# Patient Record
Sex: MALE | Race: WHITE | NOT HISPANIC OR LATINO | Employment: OTHER | URBAN - METROPOLITAN AREA
[De-identification: names, ages, dates, MRNs, and addresses within clinical notes are randomized per-mention and may not be internally consistent; named-entity substitution may affect disease eponyms.]

---

## 2017-02-22 ENCOUNTER — TRANSCRIBE ORDERS (OUTPATIENT)
Dept: LAB | Facility: CLINIC | Age: 72
End: 2017-02-22

## 2017-02-22 ENCOUNTER — APPOINTMENT (OUTPATIENT)
Dept: LAB | Facility: CLINIC | Age: 72
End: 2017-02-22
Payer: MEDICARE

## 2017-02-22 ENCOUNTER — ALLSCRIPTS OFFICE VISIT (OUTPATIENT)
Dept: OTHER | Facility: OTHER | Age: 72
End: 2017-02-22

## 2017-02-22 ENCOUNTER — GENERIC CONVERSION - ENCOUNTER (OUTPATIENT)
Dept: OTHER | Facility: OTHER | Age: 72
End: 2017-02-22

## 2017-02-22 DIAGNOSIS — I10 ESSENTIAL (PRIMARY) HYPERTENSION: ICD-10-CM

## 2017-02-22 DIAGNOSIS — R07.9 CHEST PAIN, UNSPECIFIED TYPE: Primary | ICD-10-CM

## 2017-02-22 LAB
ANION GAP SERPL CALCULATED.3IONS-SCNC: 8 MMOL/L (ref 4–13)
BUN SERPL-MCNC: 16 MG/DL (ref 5–25)
CALCIUM SERPL-MCNC: 9 MG/DL (ref 8.3–10.1)
CHLORIDE SERPL-SCNC: 102 MMOL/L (ref 100–108)
CO2 SERPL-SCNC: 29 MMOL/L (ref 21–32)
CREAT SERPL-MCNC: 1.06 MG/DL (ref 0.6–1.3)
GFR SERPL CREATININE-BSD FRML MDRD: >60 ML/MIN/1.73SQ M
GLUCOSE SERPL-MCNC: 88 MG/DL (ref 65–140)
POTASSIUM SERPL-SCNC: 3.9 MMOL/L (ref 3.5–5.3)
SODIUM SERPL-SCNC: 139 MMOL/L (ref 136–145)

## 2017-02-22 PROCEDURE — 36415 COLL VENOUS BLD VENIPUNCTURE: CPT

## 2017-02-22 PROCEDURE — 80048 BASIC METABOLIC PNL TOTAL CA: CPT

## 2017-04-03 ENCOUNTER — ALLSCRIPTS OFFICE VISIT (OUTPATIENT)
Dept: OTHER | Facility: OTHER | Age: 72
End: 2017-04-03

## 2017-05-16 ENCOUNTER — ALLSCRIPTS OFFICE VISIT (OUTPATIENT)
Dept: OTHER | Facility: OTHER | Age: 72
End: 2017-05-16

## 2017-05-16 DIAGNOSIS — R26.9 ABNORMALITY OF GAIT AND MOBILITY: ICD-10-CM

## 2017-05-16 DIAGNOSIS — R29.6 REPEATED FALLS: ICD-10-CM

## 2017-05-16 DIAGNOSIS — R13.14 DYSPHAGIA, PHARYNGOESOPHAGEAL PHASE: ICD-10-CM

## 2017-05-17 ENCOUNTER — APPOINTMENT (INPATIENT)
Dept: ULTRASOUND IMAGING | Facility: HOSPITAL | Age: 72
DRG: 093 | End: 2017-05-17
Payer: MEDICARE

## 2017-05-17 ENCOUNTER — HOSPITAL ENCOUNTER (INPATIENT)
Facility: HOSPITAL | Age: 72
LOS: 2 days | Discharge: HOME | DRG: 093 | End: 2017-05-19
Attending: EMERGENCY MEDICINE | Admitting: INTERNAL MEDICINE
Payer: MEDICARE

## 2017-05-17 ENCOUNTER — APPOINTMENT (EMERGENCY)
Dept: CT IMAGING | Facility: HOSPITAL | Age: 72
DRG: 093 | End: 2017-05-17
Payer: MEDICARE

## 2017-05-17 DIAGNOSIS — E11.9 TYPE 2 DIABETES MELLITUS WITHOUT COMPLICATION (HCC): ICD-10-CM

## 2017-05-17 DIAGNOSIS — G20 PARKINSON'S DISEASE (HCC): ICD-10-CM

## 2017-05-17 DIAGNOSIS — I10 ESSENTIAL HYPERTENSION: ICD-10-CM

## 2017-05-17 DIAGNOSIS — R07.9 CHEST PAIN: ICD-10-CM

## 2017-05-17 DIAGNOSIS — F32.A DEPRESSION: ICD-10-CM

## 2017-05-17 DIAGNOSIS — R53.1 ACUTE LEFT-SIDED WEAKNESS: ICD-10-CM

## 2017-05-17 DIAGNOSIS — I71.2 ASCENDING AORTIC ANEURYSM (HCC): ICD-10-CM

## 2017-05-17 DIAGNOSIS — E78.5 HYPERLIPIDEMIA: ICD-10-CM

## 2017-05-17 DIAGNOSIS — R29.810 FACIAL DROOP: Primary | ICD-10-CM

## 2017-05-17 DIAGNOSIS — G31.84 MILD COGNITIVE IMPAIRMENT WITH MEMORY LOSS: ICD-10-CM

## 2017-05-17 DIAGNOSIS — R47.9 DIFFICULTY WITH SPEECH: ICD-10-CM

## 2017-05-17 PROBLEM — R13.10 DYSPHAGIA: Status: ACTIVE | Noted: 2017-05-17

## 2017-05-17 LAB
ALBUMIN SERPL BCP-MCNC: 3.5 G/DL (ref 3.5–5)
ALP SERPL-CCNC: 61 U/L (ref 46–116)
ALT SERPL W P-5'-P-CCNC: 10 U/L (ref 12–78)
ANION GAP SERPL CALCULATED.3IONS-SCNC: 6 MMOL/L (ref 4–13)
AST SERPL W P-5'-P-CCNC: 25 U/L (ref 5–45)
BASOPHILS # BLD AUTO: 0.06 THOUSANDS/ΜL (ref 0–0.1)
BASOPHILS NFR BLD AUTO: 1 % (ref 0–1)
BILIRUB SERPL-MCNC: 0.6 MG/DL (ref 0.2–1)
BILIRUB UR QL STRIP: NEGATIVE
BUN SERPL-MCNC: 12 MG/DL (ref 5–25)
CALCIUM SERPL-MCNC: 9 MG/DL (ref 8.3–10.1)
CHLORIDE SERPL-SCNC: 105 MMOL/L (ref 100–108)
CK MB SERPL-MCNC: 1.7 % (ref 0–2.5)
CK MB SERPL-MCNC: 2.9 NG/ML (ref 0–5)
CK SERPL-CCNC: 147 U/L (ref 39–308)
CK SERPL-CCNC: 168 U/L (ref 39–308)
CLARITY UR: CLEAR
CO2 SERPL-SCNC: 30 MMOL/L (ref 21–32)
COLOR UR: YELLOW
CREAT SERPL-MCNC: 0.93 MG/DL (ref 0.6–1.3)
EOSINOPHIL # BLD AUTO: 0.15 THOUSAND/ΜL (ref 0–0.61)
EOSINOPHIL NFR BLD AUTO: 2 % (ref 0–6)
ERYTHROCYTE [DISTWIDTH] IN BLOOD BY AUTOMATED COUNT: 13.2 % (ref 11.6–15.1)
GFR SERPL CREATININE-BSD FRML MDRD: >60 ML/MIN/1.73SQ M
GLUCOSE SERPL-MCNC: 109 MG/DL (ref 65–140)
GLUCOSE SERPL-MCNC: 166 MG/DL (ref 65–140)
GLUCOSE UR STRIP-MCNC: NEGATIVE MG/DL
HCT VFR BLD AUTO: 42 % (ref 36.5–49.3)
HGB BLD-MCNC: 13.8 G/DL (ref 12–17)
HGB UR QL STRIP.AUTO: NEGATIVE
KETONES UR STRIP-MCNC: NEGATIVE MG/DL
LEUKOCYTE ESTERASE UR QL STRIP: NEGATIVE
LIPASE SERPL-CCNC: 167 U/L (ref 73–393)
LYMPHOCYTES # BLD AUTO: 1.59 THOUSANDS/ΜL (ref 0.6–4.47)
LYMPHOCYTES NFR BLD AUTO: 23 % (ref 14–44)
MCH RBC QN AUTO: 32.7 PG (ref 26.8–34.3)
MCHC RBC AUTO-ENTMCNC: 32.9 G/DL (ref 31.4–37.4)
MCV RBC AUTO: 100 FL (ref 82–98)
MONOCYTES # BLD AUTO: 0.59 THOUSAND/ΜL (ref 0.17–1.22)
MONOCYTES NFR BLD AUTO: 9 % (ref 4–12)
NEUTROPHILS # BLD AUTO: 4.44 THOUSANDS/ΜL (ref 1.85–7.62)
NEUTS SEG NFR BLD AUTO: 65 % (ref 43–75)
NITRITE UR QL STRIP: NEGATIVE
PH UR STRIP.AUTO: 7 [PH] (ref 4.5–8)
PLATELET # BLD AUTO: 187 THOUSANDS/UL (ref 149–390)
PLATELET # BLD AUTO: 196 THOUSANDS/UL (ref 149–390)
PMV BLD AUTO: 11.6 FL (ref 8.9–12.7)
PMV BLD AUTO: 12.3 FL (ref 8.9–12.7)
POTASSIUM SERPL-SCNC: 4.3 MMOL/L (ref 3.5–5.3)
PROT SERPL-MCNC: 7.2 G/DL (ref 6.4–8.2)
PROT UR STRIP-MCNC: NEGATIVE MG/DL
RBC # BLD AUTO: 4.22 MILLION/UL (ref 3.88–5.62)
SODIUM SERPL-SCNC: 141 MMOL/L (ref 136–145)
SP GR UR STRIP.AUTO: <=1.005 (ref 1–1.03)
TROPONIN I SERPL-MCNC: 0.02 NG/ML
TROPONIN I SERPL-MCNC: <0.02 NG/ML
TROPONIN I SERPL-MCNC: <0.02 NG/ML
UROBILINOGEN UR QL STRIP.AUTO: 0.2 E.U./DL
WBC # BLD AUTO: 6.83 THOUSAND/UL (ref 4.31–10.16)

## 2017-05-17 PROCEDURE — 82550 ASSAY OF CK (CPK): CPT | Performed by: INTERNAL MEDICINE

## 2017-05-17 PROCEDURE — 70450 CT HEAD/BRAIN W/O DYE: CPT

## 2017-05-17 PROCEDURE — 94760 N-INVAS EAR/PLS OXIMETRY 1: CPT

## 2017-05-17 PROCEDURE — 83690 ASSAY OF LIPASE: CPT | Performed by: PHYSICIAN ASSISTANT

## 2017-05-17 PROCEDURE — 93005 ELECTROCARDIOGRAM TRACING: CPT

## 2017-05-17 PROCEDURE — 85049 AUTOMATED PLATELET COUNT: CPT | Performed by: INTERNAL MEDICINE

## 2017-05-17 PROCEDURE — 85025 COMPLETE CBC W/AUTO DIFF WBC: CPT | Performed by: PHYSICIAN ASSISTANT

## 2017-05-17 PROCEDURE — 71275 CT ANGIOGRAPHY CHEST: CPT

## 2017-05-17 PROCEDURE — 80053 COMPREHEN METABOLIC PANEL: CPT | Performed by: PHYSICIAN ASSISTANT

## 2017-05-17 PROCEDURE — 84484 ASSAY OF TROPONIN QUANT: CPT | Performed by: PHYSICIAN ASSISTANT

## 2017-05-17 PROCEDURE — 93880 EXTRACRANIAL BILAT STUDY: CPT

## 2017-05-17 PROCEDURE — 74177 CT ABD & PELVIS W/CONTRAST: CPT

## 2017-05-17 PROCEDURE — 36415 COLL VENOUS BLD VENIPUNCTURE: CPT | Performed by: PHYSICIAN ASSISTANT

## 2017-05-17 PROCEDURE — 81003 URINALYSIS AUTO W/O SCOPE: CPT | Performed by: INTERNAL MEDICINE

## 2017-05-17 PROCEDURE — 99285 EMERGENCY DEPT VISIT HI MDM: CPT

## 2017-05-17 PROCEDURE — 84484 ASSAY OF TROPONIN QUANT: CPT | Performed by: INTERNAL MEDICINE

## 2017-05-17 PROCEDURE — 94664 DEMO&/EVAL PT USE INHALER: CPT

## 2017-05-17 PROCEDURE — 82948 REAGENT STRIP/BLOOD GLUCOSE: CPT

## 2017-05-17 PROCEDURE — 82553 CREATINE MB FRACTION: CPT | Performed by: INTERNAL MEDICINE

## 2017-05-17 RX ORDER — ONDANSETRON 2 MG/ML
4 INJECTION INTRAMUSCULAR; INTRAVENOUS EVERY 6 HOURS PRN
Status: DISCONTINUED | OUTPATIENT
Start: 2017-05-17 | End: 2017-05-19 | Stop reason: HOSPADM

## 2017-05-17 RX ORDER — MAGNESIUM HYDROXIDE/ALUMINUM HYDROXICE/SIMETHICONE 120; 1200; 1200 MG/30ML; MG/30ML; MG/30ML
30 SUSPENSION ORAL EVERY 6 HOURS PRN
Status: DISCONTINUED | OUTPATIENT
Start: 2017-05-17 | End: 2017-05-19 | Stop reason: HOSPADM

## 2017-05-17 RX ORDER — HEPARIN SODIUM 5000 [USP'U]/ML
5000 INJECTION, SOLUTION INTRAVENOUS; SUBCUTANEOUS EVERY 8 HOURS SCHEDULED
Status: DISCONTINUED | OUTPATIENT
Start: 2017-05-17 | End: 2017-05-19 | Stop reason: HOSPADM

## 2017-05-17 RX ORDER — NITROGLYCERIN 0.4 MG/1
0.4 TABLET SUBLINGUAL
Status: DISCONTINUED | OUTPATIENT
Start: 2017-05-17 | End: 2017-05-19 | Stop reason: HOSPADM

## 2017-05-17 RX ORDER — LISINOPRIL 20 MG/1
20 TABLET ORAL 2 TIMES DAILY
Status: DISCONTINUED | OUTPATIENT
Start: 2017-05-17 | End: 2017-05-19 | Stop reason: HOSPADM

## 2017-05-17 RX ORDER — ATORVASTATIN CALCIUM 20 MG/1
10 TABLET, FILM COATED ORAL DAILY
COMMUNITY
End: 2017-05-19 | Stop reason: HOSPADM

## 2017-05-17 RX ORDER — ATORVASTATIN CALCIUM 10 MG/1
10 TABLET, FILM COATED ORAL DAILY
Status: DISCONTINUED | OUTPATIENT
Start: 2017-05-18 | End: 2017-05-19

## 2017-05-17 RX ORDER — ASPIRIN 81 MG/1
243 TABLET ORAL ONCE
Status: COMPLETED | OUTPATIENT
Start: 2017-05-17 | End: 2017-05-17

## 2017-05-17 RX ORDER — CLOPIDOGREL BISULFATE 75 MG/1
75 TABLET ORAL DAILY
COMMUNITY
End: 2018-05-29 | Stop reason: SDUPTHER

## 2017-05-17 RX ORDER — FAMOTIDINE 20 MG/1
40 TABLET, FILM COATED ORAL DAILY
Status: DISCONTINUED | OUTPATIENT
Start: 2017-05-18 | End: 2017-05-19 | Stop reason: HOSPADM

## 2017-05-17 RX ORDER — SODIUM CHLORIDE 9 MG/ML
75 INJECTION, SOLUTION INTRAVENOUS CONTINUOUS
Status: DISCONTINUED | OUTPATIENT
Start: 2017-05-17 | End: 2017-05-19 | Stop reason: HOSPADM

## 2017-05-17 RX ORDER — ASPIRIN 81 MG/1
81 TABLET ORAL DAILY
Status: DISCONTINUED | OUTPATIENT
Start: 2017-05-18 | End: 2017-05-19 | Stop reason: HOSPADM

## 2017-05-17 RX ORDER — HYDRALAZINE HYDROCHLORIDE 20 MG/ML
10 INJECTION INTRAMUSCULAR; INTRAVENOUS EVERY 6 HOURS PRN
Status: DISCONTINUED | OUTPATIENT
Start: 2017-05-17 | End: 2017-05-19 | Stop reason: HOSPADM

## 2017-05-17 RX ORDER — ACETAMINOPHEN 325 MG/1
650 TABLET ORAL EVERY 6 HOURS PRN
Status: DISCONTINUED | OUTPATIENT
Start: 2017-05-17 | End: 2017-05-19 | Stop reason: HOSPADM

## 2017-05-17 RX ORDER — ASPIRIN 81 MG/1
81 TABLET ORAL DAILY
COMMUNITY

## 2017-05-17 RX ORDER — ISOSORBIDE DINITRATE 10 MG/1
40 TABLET ORAL DAILY
Status: DISCONTINUED | OUTPATIENT
Start: 2017-05-18 | End: 2017-05-19 | Stop reason: HOSPADM

## 2017-05-17 RX ORDER — ASPIRIN 325 MG
325 TABLET, DELAYED RELEASE (ENTERIC COATED) ORAL ONCE
Status: DISCONTINUED | OUTPATIENT
Start: 2017-05-17 | End: 2017-05-17

## 2017-05-17 RX ORDER — FAMOTIDINE 40 MG/1
40 TABLET, FILM COATED ORAL DAILY
COMMUNITY
End: 2018-05-29

## 2017-05-17 RX ORDER — CITALOPRAM 20 MG/1
20 TABLET ORAL DAILY
Status: DISCONTINUED | OUTPATIENT
Start: 2017-05-18 | End: 2017-05-19 | Stop reason: HOSPADM

## 2017-05-17 RX ORDER — HYDRALAZINE HYDROCHLORIDE 20 MG/ML
5 INJECTION INTRAMUSCULAR; INTRAVENOUS EVERY 6 HOURS PRN
Status: DISCONTINUED | OUTPATIENT
Start: 2017-05-17 | End: 2017-05-19 | Stop reason: HOSPADM

## 2017-05-17 RX ORDER — CHOLECALCIFEROL (VITAMIN D3) 125 MCG
1000 CAPSULE ORAL DAILY
Status: DISCONTINUED | OUTPATIENT
Start: 2017-05-18 | End: 2017-05-19 | Stop reason: HOSPADM

## 2017-05-17 RX ORDER — LISINOPRIL 20 MG/1
20 TABLET ORAL 2 TIMES DAILY
COMMUNITY
End: 2018-02-20 | Stop reason: ALTCHOICE

## 2017-05-17 RX ORDER — ISOSORBIDE DINITRATE 10 MG/1
40 TABLET ORAL DAILY
Status: DISCONTINUED | OUTPATIENT
Start: 2017-05-18 | End: 2017-05-17 | Stop reason: SDUPTHER

## 2017-05-17 RX ORDER — CITALOPRAM 20 MG/1
20 TABLET ORAL DAILY
COMMUNITY
End: 2018-12-18 | Stop reason: SDUPTHER

## 2017-05-17 RX ORDER — LANOLIN ALCOHOL/MO/W.PET/CERES
1000 CREAM (GRAM) TOPICAL DAILY
COMMUNITY

## 2017-05-17 RX ADMIN — SODIUM CHLORIDE 75 ML/HR: 0.9 INJECTION, SOLUTION INTRAVENOUS at 18:06

## 2017-05-17 RX ADMIN — ASPIRIN 243 MG: 81 TABLET, COATED ORAL at 11:30

## 2017-05-17 RX ADMIN — INSULIN LISPRO 1 UNITS: 100 INJECTION, SOLUTION INTRAVENOUS; SUBCUTANEOUS at 22:03

## 2017-05-17 RX ADMIN — METOPROLOL TARTRATE 12.5 MG: 25 TABLET ORAL at 18:52

## 2017-05-17 RX ADMIN — IOHEXOL 85 ML: 350 INJECTION, SOLUTION INTRAVENOUS at 13:54

## 2017-05-17 RX ADMIN — LISINOPRIL 20 MG: 20 TABLET ORAL at 18:52

## 2017-05-18 ENCOUNTER — APPOINTMENT (INPATIENT)
Dept: NUCLEAR MEDICINE | Facility: HOSPITAL | Age: 72
DRG: 093 | End: 2017-05-18
Payer: MEDICARE

## 2017-05-18 ENCOUNTER — APPOINTMENT (INPATIENT)
Dept: MRI IMAGING | Facility: HOSPITAL | Age: 72
DRG: 093 | End: 2017-05-18
Payer: MEDICARE

## 2017-05-18 ENCOUNTER — APPOINTMENT (INPATIENT)
Dept: NON INVASIVE DIAGNOSTICS | Facility: HOSPITAL | Age: 72
DRG: 093 | End: 2017-05-18
Payer: MEDICARE

## 2017-05-18 ENCOUNTER — GENERIC CONVERSION - ENCOUNTER (OUTPATIENT)
Dept: OTHER | Facility: OTHER | Age: 72
End: 2017-05-18

## 2017-05-18 ENCOUNTER — APPOINTMENT (INPATIENT)
Dept: RADIOLOGY | Facility: HOSPITAL | Age: 72
DRG: 093 | End: 2017-05-18
Payer: MEDICARE

## 2017-05-18 LAB
ALBUMIN SERPL BCP-MCNC: 3.3 G/DL (ref 3.5–5)
ALP SERPL-CCNC: 56 U/L (ref 46–116)
ALT SERPL W P-5'-P-CCNC: 29 U/L (ref 12–78)
ANION GAP SERPL CALCULATED.3IONS-SCNC: 6 MMOL/L (ref 4–13)
AST SERPL W P-5'-P-CCNC: 19 U/L (ref 5–45)
ATRIAL RATE: 60 BPM
BILIRUB SERPL-MCNC: 0.6 MG/DL (ref 0.2–1)
BUN SERPL-MCNC: 12 MG/DL (ref 5–25)
CALCIUM SERPL-MCNC: 9 MG/DL (ref 8.3–10.1)
CHEST PAIN STATEMENT: NORMAL
CHLORIDE SERPL-SCNC: 106 MMOL/L (ref 100–108)
CHOLEST SERPL-MCNC: 113 MG/DL (ref 50–200)
CK SERPL-CCNC: 142 U/L (ref 39–308)
CO2 SERPL-SCNC: 29 MMOL/L (ref 21–32)
CREAT SERPL-MCNC: 0.93 MG/DL (ref 0.6–1.3)
ERYTHROCYTE [DISTWIDTH] IN BLOOD BY AUTOMATED COUNT: 13.1 % (ref 11.6–15.1)
GFR SERPL CREATININE-BSD FRML MDRD: >60 ML/MIN/1.73SQ M
GLUCOSE SERPL-MCNC: 110 MG/DL (ref 65–140)
GLUCOSE SERPL-MCNC: 112 MG/DL (ref 65–140)
GLUCOSE SERPL-MCNC: 127 MG/DL (ref 65–140)
GLUCOSE SERPL-MCNC: 93 MG/DL (ref 65–140)
GLUCOSE SERPL-MCNC: 94 MG/DL (ref 65–140)
HCT VFR BLD AUTO: 41.7 % (ref 36.5–49.3)
HDLC SERPL-MCNC: 44 MG/DL (ref 40–60)
HGB BLD-MCNC: 13.5 G/DL (ref 12–17)
LDLC SERPL CALC-MCNC: 43 MG/DL (ref 0–100)
MAX DIASTOLIC BP: 110 MMHG
MAX HEART RATE: 95 BPM
MAX PREDICTED HEART RATE: 149 BPM
MAX. SYSTOLIC BP: 180 MMHG
MCH RBC QN AUTO: 32.4 PG (ref 26.8–34.3)
MCHC RBC AUTO-ENTMCNC: 32.4 G/DL (ref 31.4–37.4)
MCV RBC AUTO: 100 FL (ref 82–98)
P AXIS: 48 DEGREES
PLATELET # BLD AUTO: 178 THOUSANDS/UL (ref 149–390)
PMV BLD AUTO: 11.8 FL (ref 8.9–12.7)
POTASSIUM SERPL-SCNC: 3.7 MMOL/L (ref 3.5–5.3)
PR INTERVAL: 186 MS
PROT SERPL-MCNC: 6.8 G/DL (ref 6.4–8.2)
PROTOCOL NAME: NORMAL
QRS AXIS: -25 DEGREES
QRSD INTERVAL: 82 MS
QT INTERVAL: 410 MS
QTC INTERVAL: 410 MS
RBC # BLD AUTO: 4.17 MILLION/UL (ref 3.88–5.62)
REASON FOR TERMINATION: NORMAL
SODIUM SERPL-SCNC: 141 MMOL/L (ref 136–145)
T WAVE AXIS: 133 DEGREES
TARGET HR FORMULA: NORMAL
TEST INDICATION: NORMAL
TIME IN EXERCISE PHASE: 180 S
TRIGL SERPL-MCNC: 129 MG/DL
VENTRICULAR RATE: 60 BPM
WBC # BLD AUTO: 6.16 THOUSAND/UL (ref 4.31–10.16)

## 2017-05-18 PROCEDURE — 93017 CV STRESS TEST TRACING ONLY: CPT

## 2017-05-18 PROCEDURE — 82550 ASSAY OF CK (CPK): CPT | Performed by: INTERNAL MEDICINE

## 2017-05-18 PROCEDURE — 97110 THERAPEUTIC EXERCISES: CPT

## 2017-05-18 PROCEDURE — 85027 COMPLETE CBC AUTOMATED: CPT | Performed by: INTERNAL MEDICINE

## 2017-05-18 PROCEDURE — 70551 MRI BRAIN STEM W/O DYE: CPT

## 2017-05-18 PROCEDURE — G8978 MOBILITY CURRENT STATUS: HCPCS

## 2017-05-18 PROCEDURE — 97166 OT EVAL MOD COMPLEX 45 MIN: CPT

## 2017-05-18 PROCEDURE — 80061 LIPID PANEL: CPT | Performed by: INTERNAL MEDICINE

## 2017-05-18 PROCEDURE — 78452 HT MUSCLE IMAGE SPECT MULT: CPT

## 2017-05-18 PROCEDURE — 80053 COMPREHEN METABOLIC PANEL: CPT | Performed by: INTERNAL MEDICINE

## 2017-05-18 PROCEDURE — 97163 PT EVAL HIGH COMPLEX 45 MIN: CPT

## 2017-05-18 PROCEDURE — 93306 TTE W/DOPPLER COMPLETE: CPT

## 2017-05-18 PROCEDURE — G8989 SELF CARE D/C STATUS: HCPCS

## 2017-05-18 PROCEDURE — 70544 MR ANGIOGRAPHY HEAD W/O DYE: CPT

## 2017-05-18 PROCEDURE — A9502 TC99M TETROFOSMIN: HCPCS

## 2017-05-18 PROCEDURE — 82948 REAGENT STRIP/BLOOD GLUCOSE: CPT

## 2017-05-18 PROCEDURE — G8988 SELF CARE GOAL STATUS: HCPCS

## 2017-05-18 PROCEDURE — G8979 MOBILITY GOAL STATUS: HCPCS

## 2017-05-18 PROCEDURE — G8987 SELF CARE CURRENT STATUS: HCPCS

## 2017-05-18 PROCEDURE — 70030 X-RAY EYE FOR FOREIGN BODY: CPT

## 2017-05-18 PROCEDURE — 70547 MR ANGIOGRAPHY NECK W/O DYE: CPT

## 2017-05-18 RX ADMIN — LISINOPRIL 20 MG: 20 TABLET ORAL at 17:48

## 2017-05-18 RX ADMIN — FAMOTIDINE 40 MG: 20 TABLET ORAL at 11:46

## 2017-05-18 RX ADMIN — Medication 30 MG: at 11:50

## 2017-05-18 RX ADMIN — ISOSORBIDE DINITRATE 40 MG: 10 TABLET ORAL at 11:45

## 2017-05-18 RX ADMIN — LISINOPRIL 20 MG: 20 TABLET ORAL at 11:46

## 2017-05-18 RX ADMIN — CYANOCOBALAMIN TAB 500 MCG 1000 MCG: 500 TAB at 11:46

## 2017-05-18 RX ADMIN — ATORVASTATIN CALCIUM 10 MG: 10 TABLET, FILM COATED ORAL at 11:49

## 2017-05-18 RX ADMIN — REGADENOSON 0.4 MG: 0.08 INJECTION, SOLUTION INTRAVENOUS at 10:13

## 2017-05-18 RX ADMIN — ASPIRIN 81 MG: 81 TABLET, COATED ORAL at 11:46

## 2017-05-18 RX ADMIN — METOPROLOL TARTRATE 12.5 MG: 25 TABLET ORAL at 17:48

## 2017-05-18 RX ADMIN — SODIUM CHLORIDE 75 ML/HR: 0.9 INJECTION, SOLUTION INTRAVENOUS at 06:29

## 2017-05-18 RX ADMIN — CITALOPRAM HYDROBROMIDE 20 MG: 20 TABLET ORAL at 11:46

## 2017-05-18 RX ADMIN — CARBIDOPA AND LEVODOPA 1 TABLET: 25; 100 TABLET ORAL at 13:21

## 2017-05-18 RX ADMIN — METOPROLOL TARTRATE 12.5 MG: 25 TABLET ORAL at 11:46

## 2017-05-19 VITALS
SYSTOLIC BLOOD PRESSURE: 169 MMHG | DIASTOLIC BLOOD PRESSURE: 86 MMHG | HEART RATE: 57 BPM | RESPIRATION RATE: 18 BRPM | HEIGHT: 67 IN | OXYGEN SATURATION: 93 % | TEMPERATURE: 97.9 F | BODY MASS INDEX: 31.31 KG/M2 | WEIGHT: 199.52 LBS

## 2017-05-19 LAB
ALBUMIN SERPL BCP-MCNC: 3.2 G/DL (ref 3.5–5)
ALP SERPL-CCNC: 53 U/L (ref 46–116)
ALT SERPL W P-5'-P-CCNC: 20 U/L (ref 12–78)
ANION GAP SERPL CALCULATED.3IONS-SCNC: 5 MMOL/L (ref 4–13)
AST SERPL W P-5'-P-CCNC: 13 U/L (ref 5–45)
BILIRUB SERPL-MCNC: 0.6 MG/DL (ref 0.2–1)
BUN SERPL-MCNC: 11 MG/DL (ref 5–25)
CALCIUM SERPL-MCNC: 8.8 MG/DL (ref 8.3–10.1)
CHLORIDE SERPL-SCNC: 107 MMOL/L (ref 100–108)
CO2 SERPL-SCNC: 29 MMOL/L (ref 21–32)
CREAT SERPL-MCNC: 0.93 MG/DL (ref 0.6–1.3)
ERYTHROCYTE [DISTWIDTH] IN BLOOD BY AUTOMATED COUNT: 13.2 % (ref 11.6–15.1)
GFR SERPL CREATININE-BSD FRML MDRD: >60 ML/MIN/1.73SQ M
GLUCOSE SERPL-MCNC: 113 MG/DL (ref 65–140)
GLUCOSE SERPL-MCNC: 118 MG/DL (ref 65–140)
GLUCOSE SERPL-MCNC: 175 MG/DL (ref 65–140)
HCT VFR BLD AUTO: 40.8 % (ref 36.5–49.3)
HGB BLD-MCNC: 13.2 G/DL (ref 12–17)
MCH RBC QN AUTO: 32.4 PG (ref 26.8–34.3)
MCHC RBC AUTO-ENTMCNC: 32.4 G/DL (ref 31.4–37.4)
MCV RBC AUTO: 100 FL (ref 82–98)
PA ADP BLD-ACNC: 216 PRU (ref 194–418)
PLATELET # BLD AUTO: 176 THOUSANDS/UL (ref 149–390)
PMV BLD AUTO: 11.4 FL (ref 8.9–12.7)
POTASSIUM SERPL-SCNC: 3.7 MMOL/L (ref 3.5–5.3)
PROT SERPL-MCNC: 6.5 G/DL (ref 6.4–8.2)
RBC # BLD AUTO: 4.07 MILLION/UL (ref 3.88–5.62)
SODIUM SERPL-SCNC: 141 MMOL/L (ref 136–145)
WBC # BLD AUTO: 5.95 THOUSAND/UL (ref 4.31–10.16)

## 2017-05-19 PROCEDURE — 85027 COMPLETE CBC AUTOMATED: CPT | Performed by: INTERNAL MEDICINE

## 2017-05-19 PROCEDURE — 85576 BLOOD PLATELET AGGREGATION: CPT | Performed by: PSYCHIATRY & NEUROLOGY

## 2017-05-19 PROCEDURE — 80053 COMPREHEN METABOLIC PANEL: CPT | Performed by: INTERNAL MEDICINE

## 2017-05-19 PROCEDURE — 82948 REAGENT STRIP/BLOOD GLUCOSE: CPT

## 2017-05-19 RX ORDER — ATORVASTATIN CALCIUM 10 MG/1
10 TABLET, FILM COATED ORAL
Status: DISCONTINUED | OUTPATIENT
Start: 2017-05-19 | End: 2017-05-19

## 2017-05-19 RX ORDER — ATORVASTATIN CALCIUM 40 MG/1
40 TABLET, FILM COATED ORAL
Qty: 30 TABLET | Refills: 0 | Status: SHIPPED | OUTPATIENT
Start: 2017-05-19 | End: 2018-10-11

## 2017-05-19 RX ORDER — ATORVASTATIN CALCIUM 40 MG/1
40 TABLET, FILM COATED ORAL
Status: DISCONTINUED | OUTPATIENT
Start: 2017-05-19 | End: 2017-05-19 | Stop reason: HOSPADM

## 2017-05-19 RX ADMIN — ISOSORBIDE DINITRATE 40 MG: 10 TABLET ORAL at 08:56

## 2017-05-19 RX ADMIN — CYANOCOBALAMIN TAB 500 MCG 1000 MCG: 500 TAB at 08:57

## 2017-05-19 RX ADMIN — LISINOPRIL 20 MG: 20 TABLET ORAL at 08:56

## 2017-05-19 RX ADMIN — SODIUM CHLORIDE 75 ML/HR: 0.9 INJECTION, SOLUTION INTRAVENOUS at 04:06

## 2017-05-19 RX ADMIN — ASPIRIN 81 MG: 81 TABLET, COATED ORAL at 08:57

## 2017-05-19 RX ADMIN — METOPROLOL TARTRATE 12.5 MG: 25 TABLET ORAL at 08:56

## 2017-05-19 RX ADMIN — FAMOTIDINE 40 MG: 20 TABLET ORAL at 08:56

## 2017-05-19 RX ADMIN — CARBIDOPA AND LEVODOPA 1 TABLET: 25; 100 TABLET ORAL at 08:56

## 2017-05-19 RX ADMIN — Medication 30 MG: at 08:57

## 2017-05-19 RX ADMIN — CITALOPRAM HYDROBROMIDE 20 MG: 20 TABLET ORAL at 08:57

## 2017-05-22 ENCOUNTER — ANESTHESIA EVENT (OUTPATIENT)
Dept: GASTROENTEROLOGY | Facility: HOSPITAL | Age: 72
End: 2017-05-22
Payer: MEDICARE

## 2017-05-22 ENCOUNTER — TRANSCRIBE ORDERS (OUTPATIENT)
Dept: ADMINISTRATIVE | Facility: HOSPITAL | Age: 72
End: 2017-05-22

## 2017-05-22 ENCOUNTER — GENERIC CONVERSION - ENCOUNTER (OUTPATIENT)
Dept: OTHER | Facility: OTHER | Age: 72
End: 2017-05-22

## 2017-05-22 ENCOUNTER — ANESTHESIA (OUTPATIENT)
Dept: GASTROENTEROLOGY | Facility: HOSPITAL | Age: 72
End: 2017-05-22
Payer: MEDICARE

## 2017-05-22 ENCOUNTER — HOSPITAL ENCOUNTER (OUTPATIENT)
Facility: HOSPITAL | Age: 72
Setting detail: OUTPATIENT SURGERY
Discharge: HOME | End: 2017-05-22
Attending: INTERNAL MEDICINE | Admitting: INTERNAL MEDICINE
Payer: MEDICARE

## 2017-05-22 VITALS
HEIGHT: 67 IN | TEMPERATURE: 97.7 F | DIASTOLIC BLOOD PRESSURE: 96 MMHG | BODY MASS INDEX: 30.45 KG/M2 | HEART RATE: 61 BPM | RESPIRATION RATE: 18 BRPM | SYSTOLIC BLOOD PRESSURE: 193 MMHG | WEIGHT: 194 LBS | OXYGEN SATURATION: 95 %

## 2017-05-22 DIAGNOSIS — G62.9 NEUROPATHY: Primary | ICD-10-CM

## 2017-05-22 LAB — GLUCOSE SERPL-MCNC: 114 MG/DL (ref 65–140)

## 2017-05-22 PROCEDURE — 82948 REAGENT STRIP/BLOOD GLUCOSE: CPT

## 2017-05-22 RX ORDER — SODIUM CHLORIDE, SODIUM LACTATE, POTASSIUM CHLORIDE, CALCIUM CHLORIDE 600; 310; 30; 20 MG/100ML; MG/100ML; MG/100ML; MG/100ML
125 INJECTION, SOLUTION INTRAVENOUS CONTINUOUS
Status: DISCONTINUED | OUTPATIENT
Start: 2017-05-22 | End: 2017-05-22 | Stop reason: HOSPADM

## 2017-05-22 RX ORDER — LIDOCAINE HYDROCHLORIDE 10 MG/ML
INJECTION, SOLUTION INFILTRATION; PERINEURAL AS NEEDED
Status: DISCONTINUED | OUTPATIENT
Start: 2017-05-22 | End: 2017-05-22 | Stop reason: SURG

## 2017-05-22 RX ORDER — LIDOCAINE HYDROCHLORIDE 10 MG/ML
0.5 INJECTION, SOLUTION INFILTRATION; PERINEURAL ONCE AS NEEDED
Status: DISCONTINUED | OUTPATIENT
Start: 2017-05-22 | End: 2017-05-22 | Stop reason: HOSPADM

## 2017-05-22 RX ORDER — PANTOPRAZOLE SODIUM 40 MG/1
40 TABLET, DELAYED RELEASE ORAL DAILY
COMMUNITY

## 2017-05-22 RX ORDER — GLYCOPYRROLATE 0.2 MG/ML
INJECTION INTRAMUSCULAR; INTRAVENOUS AS NEEDED
Status: DISCONTINUED | OUTPATIENT
Start: 2017-05-22 | End: 2017-05-22 | Stop reason: SURG

## 2017-05-22 RX ORDER — PROPOFOL 10 MG/ML
INJECTION, EMULSION INTRAVENOUS AS NEEDED
Status: DISCONTINUED | OUTPATIENT
Start: 2017-05-22 | End: 2017-05-22 | Stop reason: SURG

## 2017-05-22 RX ADMIN — PROPOFOL 40 MG: 10 INJECTION, EMULSION INTRAVENOUS at 09:56

## 2017-05-22 RX ADMIN — GLYCOPYRROLATE 0.2 MG: 0.2 INJECTION INTRAMUSCULAR; INTRAVENOUS at 09:44

## 2017-05-22 RX ADMIN — LIDOCAINE HYDROCHLORIDE 50 MG: 10 INJECTION, SOLUTION INFILTRATION; PERINEURAL at 09:44

## 2017-05-22 RX ADMIN — PROPOFOL 100 MG: 10 INJECTION, EMULSION INTRAVENOUS at 09:48

## 2017-05-22 RX ADMIN — PROPOFOL 30 MG: 10 INJECTION, EMULSION INTRAVENOUS at 09:50

## 2017-05-22 RX ADMIN — PROPOFOL 30 MG: 10 INJECTION, EMULSION INTRAVENOUS at 09:53

## 2017-05-22 RX ADMIN — SODIUM CHLORIDE, SODIUM LACTATE, POTASSIUM CHLORIDE, AND CALCIUM CHLORIDE 125 ML/HR: .6; .31; .03; .02 INJECTION, SOLUTION INTRAVENOUS at 08:49

## 2017-05-22 RX ADMIN — SODIUM CHLORIDE, SODIUM LACTATE, POTASSIUM CHLORIDE, AND CALCIUM CHLORIDE: .6; .31; .03; .02 INJECTION, SOLUTION INTRAVENOUS at 09:09

## 2017-05-31 ENCOUNTER — ALLSCRIPTS OFFICE VISIT (OUTPATIENT)
Dept: OTHER | Facility: OTHER | Age: 72
End: 2017-05-31

## 2017-05-31 ENCOUNTER — OFFICE VISIT (OUTPATIENT)
Dept: RADIOLOGY | Facility: CLINIC | Age: 72
End: 2017-05-31
Payer: MEDICARE

## 2017-05-31 DIAGNOSIS — G62.9 NEUROPATHY: ICD-10-CM

## 2017-05-31 PROCEDURE — 95886 MUSC TEST DONE W/N TEST COMP: CPT

## 2017-05-31 PROCEDURE — 95909 NRV CNDJ TST 5-6 STUDIES: CPT

## 2017-06-01 ENCOUNTER — HOSPITAL ENCOUNTER (OUTPATIENT)
Dept: RADIOLOGY | Facility: HOSPITAL | Age: 72
Discharge: HOME | End: 2017-06-01
Attending: INTERNAL MEDICINE
Payer: MEDICARE

## 2017-06-01 DIAGNOSIS — R13.14 DYSPHAGIA, PHARYNGOESOPHAGEAL PHASE: ICD-10-CM

## 2017-06-01 PROCEDURE — 74220 X-RAY XM ESOPHAGUS 1CNTRST: CPT

## 2017-06-01 PROCEDURE — 74230 X-RAY XM SWLNG FUNCJ C+: CPT

## 2017-06-01 PROCEDURE — G8996 SWALLOW CURRENT STATUS: HCPCS

## 2017-06-01 PROCEDURE — G8997 SWALLOW GOAL STATUS: HCPCS

## 2017-06-01 PROCEDURE — 92611 MOTION FLUOROSCOPY/SWALLOW: CPT

## 2017-06-01 PROCEDURE — G8998 SWALLOW D/C STATUS: HCPCS

## 2017-06-02 ENCOUNTER — GENERIC CONVERSION - ENCOUNTER (OUTPATIENT)
Dept: OTHER | Facility: OTHER | Age: 72
End: 2017-06-02

## 2017-06-02 ENCOUNTER — ALLSCRIPTS OFFICE VISIT (OUTPATIENT)
Dept: OTHER | Facility: OTHER | Age: 72
End: 2017-06-02

## 2017-06-07 ENCOUNTER — GENERIC CONVERSION - ENCOUNTER (OUTPATIENT)
Dept: OTHER | Facility: OTHER | Age: 72
End: 2017-06-07

## 2017-06-08 ENCOUNTER — ALLSCRIPTS OFFICE VISIT (OUTPATIENT)
Dept: OTHER | Facility: OTHER | Age: 72
End: 2017-06-08

## 2017-06-15 ENCOUNTER — GENERIC CONVERSION - ENCOUNTER (OUTPATIENT)
Dept: OTHER | Facility: OTHER | Age: 72
End: 2017-06-15

## 2017-07-05 ENCOUNTER — GENERIC CONVERSION - ENCOUNTER (OUTPATIENT)
Dept: OTHER | Facility: OTHER | Age: 72
End: 2017-07-05

## 2017-07-13 ENCOUNTER — ALLSCRIPTS OFFICE VISIT (OUTPATIENT)
Dept: OTHER | Facility: OTHER | Age: 72
End: 2017-07-13

## 2017-07-13 ENCOUNTER — GENERIC CONVERSION - ENCOUNTER (OUTPATIENT)
Dept: OTHER | Facility: OTHER | Age: 72
End: 2017-07-13

## 2017-07-18 ENCOUNTER — GENERIC CONVERSION - ENCOUNTER (OUTPATIENT)
Dept: OTHER | Facility: OTHER | Age: 72
End: 2017-07-18

## 2017-07-26 ENCOUNTER — GENERIC CONVERSION - ENCOUNTER (OUTPATIENT)
Dept: OTHER | Facility: OTHER | Age: 72
End: 2017-07-26

## 2017-07-27 ENCOUNTER — ALLSCRIPTS OFFICE VISIT (OUTPATIENT)
Dept: OTHER | Facility: OTHER | Age: 72
End: 2017-07-27

## 2017-07-31 DIAGNOSIS — M62.838 OTHER MUSCLE SPASM: ICD-10-CM

## 2017-08-01 ENCOUNTER — HOSPITAL ENCOUNTER (OUTPATIENT)
Dept: RADIOLOGY | Facility: HOSPITAL | Age: 72
Discharge: HOME | End: 2017-08-01
Payer: MEDICARE

## 2017-08-01 ENCOUNTER — APPOINTMENT (OUTPATIENT)
Dept: LAB | Facility: CLINIC | Age: 72
End: 2017-08-01
Payer: MEDICARE

## 2017-08-01 DIAGNOSIS — I10 ESSENTIAL (PRIMARY) HYPERTENSION: ICD-10-CM

## 2017-08-01 DIAGNOSIS — E78.5 HYPERLIPIDEMIA: ICD-10-CM

## 2017-08-01 DIAGNOSIS — E11.65 TYPE 2 DIABETES MELLITUS WITH HYPERGLYCEMIA (HCC): ICD-10-CM

## 2017-08-01 DIAGNOSIS — D64.9 ANEMIA: ICD-10-CM

## 2017-08-01 DIAGNOSIS — M62.838 OTHER MUSCLE SPASM: ICD-10-CM

## 2017-08-01 LAB
ALBUMIN SERPL BCP-MCNC: 3.5 G/DL (ref 3.5–5)
ALP SERPL-CCNC: 72 U/L (ref 46–116)
ALT SERPL W P-5'-P-CCNC: 35 U/L (ref 12–78)
ANION GAP SERPL CALCULATED.3IONS-SCNC: 5 MMOL/L (ref 4–13)
AST SERPL W P-5'-P-CCNC: 19 U/L (ref 5–45)
BASOPHILS # BLD AUTO: 0.05 THOUSANDS/ΜL (ref 0–0.1)
BASOPHILS NFR BLD AUTO: 1 % (ref 0–1)
BILIRUB SERPL-MCNC: 0.7 MG/DL (ref 0.2–1)
BILIRUB UR QL STRIP: NEGATIVE
BUN SERPL-MCNC: 21 MG/DL (ref 5–25)
CALCIUM SERPL-MCNC: 9.2 MG/DL (ref 8.3–10.1)
CHLORIDE SERPL-SCNC: 102 MMOL/L (ref 100–108)
CHOLEST SERPL-MCNC: 127 MG/DL (ref 50–200)
CLARITY UR: CLEAR
CO2 SERPL-SCNC: 31 MMOL/L (ref 21–32)
COLOR UR: YELLOW
CREAT SERPL-MCNC: 1.12 MG/DL (ref 0.6–1.3)
EOSINOPHIL # BLD AUTO: 0.34 THOUSAND/ΜL (ref 0–0.61)
EOSINOPHIL NFR BLD AUTO: 6 % (ref 0–6)
ERYTHROCYTE [DISTWIDTH] IN BLOOD BY AUTOMATED COUNT: 13.3 % (ref 11.6–15.1)
EST. AVERAGE GLUCOSE BLD GHB EST-MCNC: 140 MG/DL
GFR SERPL CREATININE-BSD FRML MDRD: 66 ML/MIN/1.73SQ M
GLUCOSE P FAST SERPL-MCNC: 101 MG/DL (ref 65–99)
GLUCOSE UR STRIP-MCNC: NEGATIVE MG/DL
HBA1C MFR BLD: 6.5 % (ref 4.2–6.3)
HCT VFR BLD AUTO: 44.4 % (ref 36.5–49.3)
HDLC SERPL-MCNC: 42 MG/DL (ref 40–60)
HGB BLD-MCNC: 14.4 G/DL (ref 12–17)
HGB UR QL STRIP.AUTO: NEGATIVE
KETONES UR STRIP-MCNC: NEGATIVE MG/DL
LDLC SERPL CALC-MCNC: 51 MG/DL (ref 0–100)
LEUKOCYTE ESTERASE UR QL STRIP: NEGATIVE
LYMPHOCYTES # BLD AUTO: 2.53 THOUSANDS/ΜL (ref 0.6–4.47)
LYMPHOCYTES NFR BLD AUTO: 41 % (ref 14–44)
MCH RBC QN AUTO: 32.4 PG (ref 26.8–34.3)
MCHC RBC AUTO-ENTMCNC: 32.4 G/DL (ref 31.4–37.4)
MCV RBC AUTO: 100 FL (ref 82–98)
MONOCYTES # BLD AUTO: 0.67 THOUSAND/ΜL (ref 0.17–1.22)
MONOCYTES NFR BLD AUTO: 11 % (ref 4–12)
NEUTROPHILS # BLD AUTO: 2.6 THOUSANDS/ΜL (ref 1.85–7.62)
NEUTS SEG NFR BLD AUTO: 41 % (ref 43–75)
NITRITE UR QL STRIP: NEGATIVE
PH UR STRIP.AUTO: 5.5 [PH] (ref 4.5–8)
PLATELET # BLD AUTO: 173 THOUSANDS/UL (ref 149–390)
PMV BLD AUTO: 12.4 FL (ref 8.9–12.7)
POTASSIUM SERPL-SCNC: 4 MMOL/L (ref 3.5–5.3)
PROT SERPL-MCNC: 7.2 G/DL (ref 6.4–8.2)
PROT UR STRIP-MCNC: NEGATIVE MG/DL
RBC # BLD AUTO: 4.44 MILLION/UL (ref 3.88–5.62)
SODIUM SERPL-SCNC: 138 MMOL/L (ref 136–145)
SP GR UR STRIP.AUTO: 1.02 (ref 1–1.03)
TRIGL SERPL-MCNC: 168 MG/DL
TSH SERPL DL<=0.05 MIU/L-ACNC: 3.52 UIU/ML (ref 0.36–3.74)
UROBILINOGEN UR QL STRIP.AUTO: 0.2 E.U./DL
WBC # BLD AUTO: 6.19 THOUSAND/UL (ref 4.31–10.16)

## 2017-08-01 PROCEDURE — 80053 COMPREHEN METABOLIC PANEL: CPT

## 2017-08-01 PROCEDURE — 84443 ASSAY THYROID STIM HORMONE: CPT

## 2017-08-01 PROCEDURE — 72050 X-RAY EXAM NECK SPINE 4/5VWS: CPT

## 2017-08-01 PROCEDURE — 80061 LIPID PANEL: CPT

## 2017-08-01 PROCEDURE — 85025 COMPLETE CBC W/AUTO DIFF WBC: CPT

## 2017-08-01 PROCEDURE — 72072 X-RAY EXAM THORAC SPINE 3VWS: CPT

## 2017-08-01 PROCEDURE — 81003 URINALYSIS AUTO W/O SCOPE: CPT

## 2017-08-01 PROCEDURE — 36415 COLL VENOUS BLD VENIPUNCTURE: CPT

## 2017-08-01 PROCEDURE — 83036 HEMOGLOBIN GLYCOSYLATED A1C: CPT

## 2017-08-02 ENCOUNTER — GENERIC CONVERSION - ENCOUNTER (OUTPATIENT)
Dept: OTHER | Facility: OTHER | Age: 72
End: 2017-08-02

## 2017-08-04 ENCOUNTER — GENERIC CONVERSION - ENCOUNTER (OUTPATIENT)
Dept: OTHER | Facility: OTHER | Age: 72
End: 2017-08-04

## 2017-08-08 ENCOUNTER — GENERIC CONVERSION - ENCOUNTER (OUTPATIENT)
Dept: OTHER | Facility: OTHER | Age: 72
End: 2017-08-08

## 2017-08-10 ENCOUNTER — ALLSCRIPTS OFFICE VISIT (OUTPATIENT)
Dept: OTHER | Facility: OTHER | Age: 72
End: 2017-08-10

## 2017-08-13 DIAGNOSIS — G62.9 POLYNEUROPATHY: ICD-10-CM

## 2017-08-13 DIAGNOSIS — E11.65 TYPE 2 DIABETES MELLITUS WITH HYPERGLYCEMIA (HCC): ICD-10-CM

## 2017-08-13 DIAGNOSIS — R42 DIZZINESS AND GIDDINESS: ICD-10-CM

## 2017-08-13 DIAGNOSIS — D64.9 ANEMIA: ICD-10-CM

## 2017-08-13 DIAGNOSIS — E78.5 HYPERLIPIDEMIA: ICD-10-CM

## 2017-08-13 DIAGNOSIS — R29.6 REPEATED FALLS: ICD-10-CM

## 2017-08-13 DIAGNOSIS — I10 ESSENTIAL (PRIMARY) HYPERTENSION: ICD-10-CM

## 2017-08-13 DIAGNOSIS — G25.0 ESSENTIAL TREMOR: ICD-10-CM

## 2017-08-13 DIAGNOSIS — M54.12 RADICULOPATHY OF CERVICAL REGION: ICD-10-CM

## 2017-08-22 ENCOUNTER — GENERIC CONVERSION - ENCOUNTER (OUTPATIENT)
Dept: OTHER | Facility: OTHER | Age: 72
End: 2017-08-22

## 2017-08-31 ENCOUNTER — GENERIC CONVERSION - ENCOUNTER (OUTPATIENT)
Dept: OTHER | Facility: OTHER | Age: 72
End: 2017-08-31

## 2017-09-19 ENCOUNTER — GENERIC CONVERSION - ENCOUNTER (OUTPATIENT)
Dept: OTHER | Facility: OTHER | Age: 72
End: 2017-09-19

## 2017-09-22 ENCOUNTER — ALLSCRIPTS OFFICE VISIT (OUTPATIENT)
Dept: OTHER | Facility: OTHER | Age: 72
End: 2017-09-22

## 2017-09-22 DIAGNOSIS — M50.920 MID-CERVICAL DISC DISORDER, UNSPECIFIED: ICD-10-CM

## 2017-10-03 ENCOUNTER — APPOINTMENT (OUTPATIENT)
Dept: LAB | Facility: CLINIC | Age: 72
End: 2017-10-03
Payer: MEDICARE

## 2017-10-03 ENCOUNTER — TRANSCRIBE ORDERS (OUTPATIENT)
Dept: LAB | Facility: CLINIC | Age: 72
End: 2017-10-03

## 2017-10-03 DIAGNOSIS — I25.10 ATHEROSCLEROSIS OF NATIVE CORONARY ARTERY OF NATIVE HEART WITHOUT ANGINA PECTORIS: Primary | ICD-10-CM

## 2017-10-03 DIAGNOSIS — I25.10 ATHEROSCLEROSIS OF NATIVE CORONARY ARTERY OF NATIVE HEART WITHOUT ANGINA PECTORIS: ICD-10-CM

## 2017-10-03 LAB
ALBUMIN SERPL BCP-MCNC: 3.3 G/DL (ref 3.5–5)
ALP SERPL-CCNC: 67 U/L (ref 46–116)
ALT SERPL W P-5'-P-CCNC: 34 U/L (ref 12–78)
ANION GAP SERPL CALCULATED.3IONS-SCNC: 8 MMOL/L (ref 4–13)
AST SERPL W P-5'-P-CCNC: 40 U/L (ref 5–45)
BILIRUB SERPL-MCNC: 1.14 MG/DL (ref 0.2–1)
BUN SERPL-MCNC: 17 MG/DL (ref 5–25)
CALCIUM SERPL-MCNC: 8.8 MG/DL (ref 8.3–10.1)
CHLORIDE SERPL-SCNC: 108 MMOL/L (ref 100–108)
CO2 SERPL-SCNC: 25 MMOL/L (ref 21–32)
CREAT SERPL-MCNC: 0.91 MG/DL (ref 0.6–1.3)
GFR SERPL CREATININE-BSD FRML MDRD: 84 ML/MIN/1.73SQ M
GLUCOSE P FAST SERPL-MCNC: 105 MG/DL (ref 65–99)
POTASSIUM SERPL-SCNC: 4 MMOL/L (ref 3.5–5.3)
PROT SERPL-MCNC: 7.2 G/DL (ref 6.4–8.2)
SODIUM SERPL-SCNC: 141 MMOL/L (ref 136–145)
VALPROATE SERPL-MCNC: 14 UG/ML (ref 50–100)

## 2017-10-03 PROCEDURE — 80164 ASSAY DIPROPYLACETIC ACD TOT: CPT

## 2017-10-03 PROCEDURE — 36415 COLL VENOUS BLD VENIPUNCTURE: CPT

## 2017-10-03 PROCEDURE — 80053 COMPREHEN METABOLIC PANEL: CPT

## 2017-10-04 ENCOUNTER — TRANSCRIBE ORDERS (OUTPATIENT)
Dept: ADMINISTRATIVE | Facility: HOSPITAL | Age: 72
End: 2017-10-04

## 2017-10-04 DIAGNOSIS — G47.9 REPETITIVE INTRUSIONS OF SLEEP: Primary | ICD-10-CM

## 2017-10-06 ENCOUNTER — TRANSCRIBE ORDERS (OUTPATIENT)
Dept: SLEEP CENTER | Facility: CLINIC | Age: 72
End: 2017-10-06

## 2017-10-06 ENCOUNTER — ALLSCRIPTS OFFICE VISIT (OUTPATIENT)
Dept: OTHER | Facility: OTHER | Age: 72
End: 2017-10-06

## 2017-10-06 DIAGNOSIS — G47.33 OSA (OBSTRUCTIVE SLEEP APNEA): Primary | ICD-10-CM

## 2017-10-13 ENCOUNTER — GENERIC CONVERSION - ENCOUNTER (OUTPATIENT)
Dept: OTHER | Facility: OTHER | Age: 72
End: 2017-10-13

## 2017-10-20 ENCOUNTER — GENERIC CONVERSION - ENCOUNTER (OUTPATIENT)
Dept: OTHER | Facility: OTHER | Age: 72
End: 2017-10-20

## 2017-10-27 NOTE — CONSULTS
Assessment  Assessed    1  Cervical disc disorder with radiculopathy of mid-cervical region (723 4) (M50 120)   2  Myofascial muscle pain (729 1) (M79 1)    Plan  Cervical disc disorder with radiculopathy of mid-cervical region    · * MRI CERVICAL SPINE WO CONTRAST; Status:Need Information - Financial  Authorization; Requested for:[id number];    Perform:[facility] Radiology; XCD:[id number]; Ordered; For:Cervical disc disorder with radiculopathy of mid-cervical region; Ordered By:Mohan Mcneil;  Myofascial muscle pain    · Methocarbamol 500 MG Oral Tablet; TAKE 0 5 to 1 TABLET Bedtime PRN muscle  spasm   Rx By: Lynn; Dispense: 30 Days ; #:30 Tablet; Refill: 2;For: Myofascial muscle pain; PANCHITO = N; Verified Transmission to [facility] #[id number]; Last Updated By: SystemHubskip; 9/22/2017 11:56:10 AM    Discussion/Summary    The patient's symptoms, history/physical are consistent with pain that is multifactorial in origin but predominantly a result of his underlying cervical spondylosis and disc degeneration which is likely impinging and leading to a left-sided radiculopathy  Symptoms have improved significantly however she is still symptomatic I will order an MRI of the cervical spine to evaluate further  I advised her I'll call him with the results and discuss treatment moving forward   now, I will start him on methocarbamol 500 mg half tablet at bedtime to help with the spasms  He was apprised of the most common side effects including sleepiness and dizziness  Chief Complaint  Chief Complaints    1  Neck Pain    History of Present Illness  The patient is a pleasant 51-year-old male who presents for consultation in regards to neck and left sided arm pain  Symptoms are present for 4-5 months without any precipitating injury or trauma  Pain is moderate to severe rated 5?7/10 on a numeric rating scale that is felt intermittently but worst in the morning   Pain is described to be sharp, pressure-like, shooting in the neck into the left shoulder and down the arm  He feels weakness of the left arm  Symptoms are aggravated with turning his head  There is no change with coughing, sneezing or bowel movements  history has included physical therapy which has been providing significant relief  Exercise on her own provides no relief  He was given gabapentin be had significant side effects from that it  Referring physician is  Bhupinder Garvey presents with complaints of gradual onset of intermittent episodes of moderate entire neck pain, described as sharp, radiating to the left trapezius  On a scale of 1 to 10, the patient rates the pain as 7  Review of Systems    Constitutional: no fever,-- no recent weight gain-- and-- no recent weight loss  Eyes: no double vision-- and-- no blurry vision  Cardiovascular: chest pain-- and-- palpitations, but-- no lower extremity edema  Respiratory: shortness of breath, but-- no wheezing  Musculoskeletal: pain in extremity , but-- no difficulty walking,-- no muscle weakness,-- no joint swelling,-- no limb swelling-- and-- no decreased range of motion  Neurological: dizziness,-- difficulty swallowing-- and-- memory loss, but-- no loss of consciousness-- and-- no seizures  Gastrointestinal: nausea, but-- no vomiting,-- no constipation-- and-- no diarrhea  Genitourinary: no difficulty initiating urine stream,-- no genital pain-- and-- no frequent urination  Integumentary: no complaints of skin rash  Psychiatric: depression  Endocrine: no excessive thirst,-- no adrenal disease,-- no hypothyroidism-- and-- no hyperthyroidism  Hematologic/Lymphatic: no tendency for easy bruising-- and-- no tendency for easy bleeding  ROS reviewed  Active Problems  Problems    1  Abdominal pain, acute, epigastric (789 06,338 19) (R10 13)   2  Acquired polyneuropathy (356 9) (G62 9)   3  Anemia (285 9) (D64 9)   4   Aneurysm of infrarenal abdominal aorta (441 4) (I71 4)   5  Arteriosclerotic cardiovascular disease (429 2,440 9) (I25 10)   6  Benign essential hypertension (401 1) (I10)   7  Bilateral hearing loss, unspecified hearing loss type (389 9) (H91 93)   8  Bilateral impacted cerumen (380 4) (H61 23)   9  Cervical radicular pain (723 4) (M54 12)   10  Diabetes type 2, uncontrolled (250 02) (E11 65)   11  Dizziness (780 4) (R42)   12  Essential tremor (333 1) (G25 0)   13  Frequent falls (V15 88) (R29 6)   14  Gait disturbance (781 2) (R26 9)   15  GERD (gastroesophageal reflux disease) (530 81) (K21 9)   16  Hyperlipidemia (272 4) (E78 5)   17  Murmur (785 2) (R01 1)   18  Onychomycosis (110 1) (B35 1)   19  Peripheral vascular disease (443 9) (I73 9)   20  Pharyngoesophageal dysphagia (787 24) (R13 14)   21  Sensorineural hearing loss, bilateral (389 18) (H90 3)   22  Sleep disturbances (780 50) (G47 9)   23  Thoracic ascending aortic aneurysm (441 2) (I71 2)   24  Transition of care   25  Trapezius muscle spasm (728 85) (W79 188)    Past Medical History  Problems    1  History of Angiographic Coronary Findings   2  History of shortness of breath (V13 89) (Z87 898)   3  History of Medicare annual wellness visit, subsequent (V70 0) (Z00 00)   4  History of Thoracic ascending aortic aneurysm (441 2) (I71 2)    The active problems and past medical history were reviewed and updated today  Surgical History  Problems    1  History of Hernia Repair   2  History of Surgery For Popliteal Artery Aneurysm    The surgical history was reviewed and updated today  Family History    The family history was reviewed and updated today  Social History  Problems    · Former smoker (W30 70) (D91 080)   · Stopped Drinking Alcohol  The social history was reviewed and updated today  The social history was reviewed and is unchanged  Current Meds   1  Accu-Chek Barbara Plus In Vitro Strip; TEST TWICE DAILY; Therapy: 07Apr2017 to (Evaluate:46Hqx8819);  Last Rx:07Apr2017 Ordered   2  [employer] Barbara Plus w/Device Kit; USE AS DIRECTED; Therapy: 16VPY9166 to (Last Rx:07Apr2017) Ordered   3  ALPRAZolam 0 5 MG Oral Tablet; 1/2 TAB IN AM AND 1 TAB AT NIGHT - CAN USE 1/2 TAB   MID DAY FOR PRN ANXIETY; Therapy: 64QGI6194 to (Dale Mauricio)  Requested for: 18Ryz7819; Last   Rx:73Ucp9503 Ordered   4  Aspirin 81 MG TABS; TAKE 1 TABLET DAILY; Therapy: 96LPB3800 to Recorded   5  Atorvastatin Calcium 40 MG Oral Tablet; take 1 tablet by mouth every evening  Requested   for: 51Ddi9006; Last Rx:76Awl2739 Ordered   6  Citalopram Hydrobromide 20 MG Oral Tablet; 1 TAB MID DAY-DOSAGE CHANGE;   Therapy: 63CKL4025 to (Evaluate:15Oct2017)  Requested for: 18Apr2017; Last   Rx:16Dpr5214 Ordered   7  Clopidogrel Bisulfate 75 MG Oral Tablet; TAKE 1 TABLET DAILY; Therapy: (Recorded:06Nov2014) to Recorded   8  CoQ10 CAPS; Therapy: (Recorded:18Jan2016) to Recorded   9  Famotidine 40 MG Oral Tablet; 1 TAB AT SUPPER  FOR ABDOMINAL PAIN- DO NOT TAKE   WITH OTHER MEDS; Therapy: 28CUQ1494 to (Evaluate:17Oct2017)  Requested for: 21Mar2017; Last   Rx:21Mar2017 Ordered   10  Isosorbide Mononitrate ER 60 MG Oral Tablet Extended Release 24 Hour; TAKE 1    TABLET ONCE DAILY; Therapy: 60AST6160 to Recorded   11  Lisinopril 20 MG Oral Tablet; TAKE 1 TABLET TWICE DAILY  Requested for: 12Gpi2945;    Last Rx:43Ruw2624 Ordered   12  MetFORMIN HCl - 1000 MG Oral Tablet; TAKE 1 TABLET TWICE DAILY; Therapy: 07PIP4683 to (Jo Ann Ferro) Recorded   13  Metoprolol Tartrate 25 MG Oral Tablet; TAKES HALF A TABLET TWICE A DAY; Therapy: 50LMC2335 to (Jo Ann Ferro) Recorded   14  Pantoprazole Sodium 40 MG Oral Tablet Delayed Release; take 1 tablet by mouth once    daily; Therapy: 62KIE7438 to ([id number])  Requested for: 28Yfr6763 Recorded   15  PredniSONE 10 MG Oral Tablet; 1 PO BID FOR 3 DAYS THEN 1/2 TAB BID FOR 3 DAYS;     Therapy: 45Ojb2360 to (Evaluate:64Hpu6841)  Requested for: 20YSX1995; Last    Rx:32Odu2376 Ordered   16  Vitamin B12 TABS; Therapy: (Recorded:18Jan2016) to Recorded    The medication list was reviewed and updated today  Allergies  Medication    1  No Known Drug Allergies    Vitals  Vital Signs    Recorded: 30ZWW7434 10:58AM   Temperature 97 8 F   Heart Rate 72   Respiration 16   Systolic 211   Diastolic 80   Height 5 ft 7 in   Weight 195 lb    BMI Calculated 30 54   BSA Calculated 2   O2 Saturation 98   Pain Scale 7     Physical Exam    Constitutional   General appearance: Well developed, well nourished, alert, in no distress, non-toxic and no overt pain behavior  Eyes   Sclera: anicteric   HEENT   Hearing grossly intact  Neck   Neck: Supple, symmetric, trachea midline, no masses  Pulmonary   Respiratory effort: Even and unlabored  Cardiovascular   Examination of extremities: No edema or pitting edema present  Skin   Skin and subcutaneous tissue: Normal without rashes or lesions, well hydrated  Psychiatric   Mood and affect: Mood and affect appropriate  Cervical Spine examination demonstrates Cervical Spine:   Appearance: Normal    Tenderness: cervical spine tenderness,-- left paraspinal tenderness-- and-- left trapezius muscle  Left-sided cervical facet tenderness at C3-4, C4-5, C5-6 with positive facet loading  Palpatory findings include left-sided muscle spasms  Cervical Sensory Exam:  intact to light touch and pinprick in the upper extremities  Flexion was restricted-- and-- was painful  Extension was restricted-- and-- was painful  Left lateral flexion was restricted-- and-- was painful  Right lateral flexion was not restricted-- and-- was painless  Rotation to the left was restricted-- and-- was painful  Rotation to the right was not restricted-- and-- was painless    hand strength was normal bilaterally  wrist strength was normal bilaterally  elbow strength was normal bilaterally     Evaluation of Muscle Stretch Reflexes on the right side demonstrates 2/4 Triceps Reflex-- and-- 2/4 Biceps Reflex  Evaluation of Muscle Stretch Reflexes on the left side demonstrates 2/4 Triceps Reflex-- and-- 2/4 Biceps Reflex  Special Tests: positive Spurling's Maneuver to the left, but-- negative Spurling's Maneuver to the right        Future Appointments    Date/Time Provider Specialty Site   10/18/2017 10:00 AM Candi Velez,  Internal Medicine [address] [facility]   02/19/2018 05:30 PM Hazel Lo MD Neurology  [address] [facility]     Signatures   Electronically signed by : Anthony aFir MD; Sep 22 2017 12:17PM EST                       (Author)

## 2017-11-06 ENCOUNTER — GENERIC CONVERSION - ENCOUNTER (OUTPATIENT)
Dept: OTHER | Facility: OTHER | Age: 72
End: 2017-11-06

## 2017-11-09 ENCOUNTER — GENERIC CONVERSION - ENCOUNTER (OUTPATIENT)
Dept: OTHER | Facility: OTHER | Age: 72
End: 2017-11-09

## 2017-11-15 ENCOUNTER — HOSPITAL ENCOUNTER (OUTPATIENT)
Dept: SLEEP CENTER | Facility: CLINIC | Age: 72
Discharge: HOME | End: 2017-11-15
Payer: MEDICARE

## 2017-11-15 NOTE — PROGRESS NOTES
Consultation - [address] [employer] [address]  67 y o  male  ANGELA:[birth year]  RRP:[id number]    Physician Requesting Consult: Guy Escudero PA-C     Reason for Consult : At your kind request I saw this patient for initial sleep evaluation today  He presents with complaints of sleep terrors and I throw myself out of bed     Medications, Past, Family and Social Histories were reviewed  Comorbidities are notable for:  Hypertension, coronary artery disease for which he recently had PTCA and diabetes  Bartholome Pinks Herewith findings in summary  Full details are available from our records  HPI:  He reports nightmares of several years duration but acting out dreams started approximately a year ago and he has had several episodes  Fortunately there is no report of any injury  He has been snoring for years and this is loud enough to disturb others  He sleeps alone and is not aware of modifying factors or breathing difficulties during sleep  He reported symptoms suggestive of restless leg syndrome and thrashes excessively during sleep  Sleep Routine:  He is spending 8 hours in bed  He usually falls asleep in 30 minutes  Sleep is interrupted 3-4 times a night  He awakens feeling under refreshed and has excessive daytime drowsiness  He rated himself at 9/24 on the [place] Sleepiness Scale and takes daytime naps  Habits:   reports that he quit smoking about 44 years ago  He does not have any smokeless tobacco history on file  ,  reports that he does not drink alcohol ,  reports that he does not use drugs  , he does not exercise  ROS:  Weight has been stable  He has acid reflux  He reports difficulties with memory  He reported no cardiac or respiratory symptoms  A 10 point review of systems was otherwise unremarkable  Physical Exam: Salient findings on exam, are a body mass index 32  Vitals are stable  He had difficulty recalling he has medical problems and medications    Mental state otherwise appears normal  Craniofacial anatomy is normal    There is excess fatty tissue and neck is thick  There are no abnormal neck masses  Nasal airway is patent  Mucous membranes appeared normal  The oral airway is crowded  Base of tongue is at Modified Mallampati class IV  He has dentures in the upper jaw and missing teeth in the lower  Heart sounds are regular, there 3/6 ejection systolic murmur, no JVD or pedal edema  He has truncal obesity  The rest of his exam was unremarkable  Impression:   1  Probable obstructive sleep apnea  2  Restless leg syndrome and he may also have periodic limb movements of sleep  3  REM behavior disorder  4  Hypersomnolence secondary to the above   5  Obesity  6  Comorbidities as outlined above    Plan:  1  With respect to above conditions, I counseled on pathophysiology, diagnosis, treatment options, risks and benefits; interrelationship and effects on symptoms and comorbidities; risks of no treatment; costs and insurance aspects  2  I advised on weight reduction, avoiding sleeping supine, using alcohol or sedating medications close to bed time and on safe driving practices  I also advised on safety precaution with respect to his parasomnia activity  3  Nocturnal polysomnography is indicated and a diagnostic study will be scheduled  This will be undertaken using a parasomnia amount arch  4  Patient is willing to try Positive airway pressure therapy and will be scheduled for a titration study if indicated  5  Follow-up will be scheduled after the studies to review results, further details of treatment options and to initiate/adjust therapy  Thank you for allowing me to participate in the care of this patient  I will keep you apprised of developments       Sincerely,    Cristiane Nieto MD  Board Certified Specialist

## 2017-11-28 ENCOUNTER — HOSPITAL ENCOUNTER (OUTPATIENT)
Dept: SLEEP CENTER | Facility: CLINIC | Age: 72
Discharge: HOME | End: 2017-11-28
Payer: MEDICARE

## 2017-11-28 DIAGNOSIS — G47.33 OSA (OBSTRUCTIVE SLEEP APNEA): ICD-10-CM

## 2017-11-28 DIAGNOSIS — G47.9 REPETITIVE INTRUSIONS OF SLEEP: ICD-10-CM

## 2017-11-28 PROCEDURE — 95810 POLYSOM 6/> YRS 4/> PARAM: CPT

## 2017-11-30 ENCOUNTER — TRANSCRIBE ORDERS (OUTPATIENT)
Dept: SLEEP CENTER | Facility: CLINIC | Age: 72
End: 2017-11-30

## 2017-11-30 DIAGNOSIS — G47.33 OSA (OBSTRUCTIVE SLEEP APNEA): Primary | ICD-10-CM

## 2017-12-04 ENCOUNTER — ALLSCRIPTS OFFICE VISIT (OUTPATIENT)
Dept: OTHER | Facility: OTHER | Age: 72
End: 2017-12-04

## 2017-12-05 NOTE — PROGRESS NOTES
Assessment    1  Diabetes type 2, uncontrolled (250 02) (E11 65)   2  Peripheral vascular disease (443 9) (I73 9)   3  Benign essential hypertension (401 1) (I10)   4  Medicare annual wellness visit, subsequent (V70 0) (Z00 00)   5  Acquired polyneuropathy (356 9) (G62 9)   6  Anxiety (300 00) (F41 9)   7   Arteriosclerotic cardiovascular disease (429 2,440 9) (I25 10)    Plan  Anemia, Arteriosclerotic cardiovascular disease, Benign essential hypertension, Diabetes type 2,uncontrolled, Hyperlipidemia    · Isosorbide Mononitrate ER 60 MG Oral Tablet Extended Release 24 Hour; TAKE 1 TABLETONCE DAILY  Aneurysm of infrarenal abdominal aorta, Arteriosclerotic cardiovascular disease, Benign essentialhypertension, Diabetes type 2, uncontrolled    · Valsartan 320 MG Oral Tablet; TAKE 1 TABLET DAILY  Aneurysm of infrarenal abdominal aorta, Arteriosclerotic cardiovascular disease, Benign essentialhypertension, Diabetes type 2, uncontrolled, Hyperlipidemia, Thoracic ascending aortic aneurysm    · Atorvastatin Calcium 40 MG Oral Tablet; take 1 tablet by mouth every evening  Anxiety, PMH: Mental status change    · Citalopram Hydrobromide 20 MG Oral Tablet; 1 TAB MID DAY-DOSAGE CHANGE  Benign essential hypertension    · Lisinopril 20 MG Oral Tablet  GERD (gastroesophageal reflux disease)    · Pantoprazole Sodium 40 MG Oral Tablet Delayed Release (Protonix); take 1 tablet by mouthonce daily  PMH: Bilateral impacted cerumen    · Clopidogrel Bisulfate 75 MG Oral Tablet; take one tablet by mouth one time daily    Discussion/Summary  Discussion Summary:   HTN- CHANGE LISINOPRIL 20 BID TO VALSARTAN 320 ONCE A DAY TO HELP WITH BETTER BP CONTROL AND COMPLIANCESTABLE- OBTAIN LABS PER [facility] AND GET ME A COPYSTABLE- SOME SOB ON EXERTION- NO CHANGE AND WOKR UP EQUIVOCAL SO FAR; ON BETA BLOCKER AND ISOSORBIDE AS WELL AS ARB; ON SATAIN, PLAVIX AND ASAON PLAVIX- IMPROVED WITH PANTOPRAZOLESTABLEWAS BETTER IWTH HIGHER DOSE OF BZD - MAY NEED TO CHANGE MEDS WHEN WEANED OFFUP 4 MONTHSUP TO [address]  Chief Complaint  Chief Complaint Free Text Note Form: PATIENT HERE FOR FOLLOW UPHAS HAD MULTIPLE MED CHANGES NOTED IN [facility] IN THE AM- GETS BETTER IN 20 MINUTES;[facility]   Chief Complaint Chronic Condition [facility]: Patient is here today for follow up of chronic conditions described in HPI  History of Present Illness  Anxiety Disorder (Follow-Up): The patient is being seen for follow-up of anxiety and HIS ANXIETY IS WORSE; HIS TREMOR OF LEFT UPPER EXT IS WORSE;  The patient reports no change in the condition  He has had no significant interval events  Interval symptoms:  stable anxiety,-- denies difficulty concentrating,-- denies restlessness,-- denies panic attacks-- and-- stable sleep disruption  Associated symptoms: no grandiosity,-- no racing thoughts,-- no periods of euphoria,-- no hallucinations-- and-- no suicidal ideation  Medications:  the patient is adherent to his medication regimen, but-- he denies medication side effects  HPI: PT IS TIRED, ACHEY IN THE AM; HE FELT OFF BALANCE THIS AM- HE HAS HX OF NEUROPATHY; HE IS MORE TIRED; Peripheral Vascular Disease (Brief): The patient is being seen for a routine clinic follow-up of and TREMOR LEFT UPPER EXT; DECREASED SENSATION LOWER EXT peripheral vascular disease  Symptoms:  gait abnormality, but-- no exertional leg pain,-- no resting leg pain-- and-- no leg cramps  The patient is currently experiencing symptoms  No associated symptoms are reported  Hypertension (Follow-Up): The patient presents for follow-up of essential hypertension-- and-- BP NOT WELL CONTROLLED ON LISINOPRIL BID- WILL CHANGE TO VALSARTAN 320 MG  The patient states he has been stable with his blood pressure control since the last visit  Symptoms:    Diabetes Type II (Follow-Up):  The patient states he has been stable with his Type II Diabetes control since the last visit ON METFORMIN 1000 MG DAILY  Comorbid Illnesses: hypertension-- and-- hyperlipidemia  Complications: no peripheral neuropathy,-- no gastroparesis,-- no nephropathy-- and-- no coronary artery disease  He has no known diabetic complications  Symptoms: stable numbness of the feet,-- denies a foot ulcer,-- denies foot pain-- and-- denies vomiting  Associated symptoms include no polyuria  Home monitoring: Glycemic control has been good  -- the patient reports no symptomatic hypoglycemic episodes  Medications: the patient is adherent with his medication regimen  Depression Screening and Treatment Clinical Pathway: The patient is being seen for ANXIETY - STABLE - WEANING OFF OF BZD depression  The patient is currently experiencing symptoms  depressed mood no fatigue no sense of failure no poor concentration no indecisiveness no psychomotor retardation no appetite change no weight loss no weight gain no insomnia                        Review of Systems  Complete-Male:  Constitutional: No fever or chills, feels well, no tiredness, no recent weight gain or weight loss,-- as noted in HPI,-- not feeling poorly-- and-- not feeling tired  Eyes: eyesight problems-- and-- WEARS GLASSES, but-- No complaints of eye pain, no red eyes, no discharge from eyes, no itchy eyes  ENT: HEARING AIDS B/L, but-- no complaints of earache, no hearing loss, no nosebleeds, no nasal discharge, no sore throat, no hoarseness,-- no earache,-- no nosebleeds,-- no hearing loss-- and-- no nasal discharge  Cardiovascular: No complaints of slow heart rate, no fast heart rate, no chest pain, no palpitations, no leg claudication, no lower extremity,-- as noted in HPI,-- no chest pain,-- no intermittent leg claudication,-- no palpitations-- and-- no extremity edema  Respiratory: as noted in HPI,-- no shortness of breath,-- no cough,-- no wheezing-- and-- no shortness of breath during exertion    Gastrointestinal: No complaints of abdominal pain, no constipation, no nausea or vomiting, no diarrhea or bloody stools,-- as noted in HPI,-- no nausea-- and-- no diarrhea--   The patient presents with complaints of sudden onset of intermittent episodes of moderate epigastric no abdominal pain, described as sharp, radiating to the epigastric area  On a scale of 1 to 10, the patient rates the pain as 7  Symptoms are not improved by antiemetics, antacids, bismuth subsalicylate, h2 blockers and PPIs  Symptoms are not made worse by eating, drinking, fatty foods, dairy foods and alcohol  Symptoms are unchanged  Genitourinary: No complaints of dysuria, no incontinence, no hesitancy, no nocturia, no genital lesion, no testicular pain,-- no urinary hesitancy-- and-- no nocturia  Musculoskeletal: No complaints of arthralgia, no myalgias, no joint swelling or stiffness, no limb pain or swelling,-- as noted in HPI,-- no arthralgias,-- no joint swelling-- and-- no joint stiffness  Integumentary: No complaints of skin rash or skin lesions, no itching, no skin wound, no dry skin,-- no rashes,-- no itching,-- no skin lesions-- and-- no skin wound  Neurological: No compliants of headache, no confusion, no convulsions, no numbness or tingling, no dizziness or fainting, no limb weakness, no difficulty walking,-- no headache,-- no numbness,-- no confusion,-- no dizziness-- and-- no convulsions  Psychiatric: Is not suicidal, no sleep disturbances, no anxiety or depression, no change in personality, no emotional problems,-- no anxiety-- and-- no depression  Endocrine: No complaints of proptosis, no hot flashes, no muscle weakness, no erectile dysfunction, no deepening of the voice, no feelings of weakness,-- no proptosis,-- no muscle weakness-- and-- no hot flashes  Hematologic/Lymphatic: No complaints of swollen glands, no swollen glands in the neck, does not bleed easily, no easy bruising,-- no swollen glands-- and-- no swollen glands in the neck  ROS Reviewed:   ROS reviewed        Active Problems  1  Acquired polyneuropathy (356 9) (G62 9)   2  Anemia (285 9) (D64 9)   3  Aneurysm of infrarenal abdominal aorta (441 4) (I71 4)   4  Anxiety (300 00) (F41 9)   5  Arteriosclerotic cardiovascular disease (429 2,440 9) (I25 10)   6  Benign essential hypertension (401 1) (I10)   7  Bilateral hearing loss, unspecified hearing loss type (389 9) (H91 93)   8  Cervical disc disorder with radiculopathy of mid-cervical region (723 4) (M50 120)   9  Cervical radicular pain (723 4) (M54 12)   10  Confusion (298 9) (R41 0)   11  Diabetes type 2, uncontrolled (250 02) (E11 65)   12  Dizziness (780 4) (R42)   13  Essential tremor (333 1) (G25 0)   14  Frequent falls (V15 88) (R29 6)   15  Gait disturbance (781 2) (R26 9)   16  GERD (gastroesophageal reflux disease) (530 81) (K21 9)   17  Hyperlipidemia (272 4) (E78 5)   18  Medicare annual wellness visit, subsequent (V70 0) (Z00 00)   19  Murmur (785 2) (R01 1)   20  Myofascial muscle pain (729 1) (M79 1)   21  Onychomycosis (110 1) (B35 1)   22  Peripheral vascular disease (443 9) (I73 9)   23  Pharyngoesophageal dysphagia (787 24) (R13 14)   24  Sensorineural hearing loss, bilateral (389 18) (H90 3)   25  Sleep disturbances (780 50) (G47 9)   26  Thoracic ascending aortic aneurysm (441 2) (I71 2)   27  Trapezius muscle spasm (728 85) (J74 776)    Past Medical History  1  History of Abdominal pain, acute, epigastric (789 06,338 19) (R10 13)   2  History of Angiographic Coronary Findings   3  History of Bilateral impacted cerumen (380 4) (H61 23)   4  History of influenza vaccination (V49 89) (Z92 29)   5  History of shortness of breath (V13 89) (Z87 898)   6  History of Medicare annual wellness visit, subsequent (V70 0) (Z00 00)   7  History of Thoracic ascending aortic aneurysm (441 2) (I71 2)   8  History of Transition of care   9  History of Transition of care  Active Problems And Past Medical History Reviewed:    The active problems and past medical history were reviewed and updated today  Surgical History  1  History of Hernia Repair   2  History of Surgery For Popliteal Artery Aneurysm  Surgical History Reviewed: The surgical history was reviewed and updated today  Family History  Family History Reviewed: The family history was reviewed and updated today  Social History     · Former smoker (C55 07) (J35 598)   · Stopped Drinking Alcohol  Social History Reviewed: The social history was reviewed and updated today  The social history was reviewed and is unchanged  Current Meds   1  Accu-Chek Barbara Plus In Vitro Strip; TEST TWICE DAILY; Therapy: 07Apr2017 to (Evaluate:77Wbr7772); Last Rx:07Apr2017 Ordered   2  Accu-Chek Barbara Plus w/Device Kit; USE AS DIRECTED; Therapy: 14YKD8532 to (Last Rx:07Apr2017) Ordered   3  ALPRAZolam 0 5 MG Oral Tablet; take 0 5 tablet bedtime; Therapy: (Recorded:27Ltz1363) to Recorded   4  Aspirin 81 MG TABS; TAKE 1 TABLET DAILY; Therapy: 18JUW1703 to Recorded   5  Atorvastatin Calcium 40 MG Oral Tablet; take 1 tablet by mouth every evening  Requested for: 11Jxw5133; Last Rx:56Ynw9911 Ordered   6  Citalopram Hydrobromide 20 MG Oral Tablet; 1 TAB MID DAY-DOSAGE CHANGE; Therapy: 65EEC1669 to (Evaluate:17Nov2018)  Requested for: 72KIE4837; Last Rx:24Nov2017 Ordered   7  Clopidogrel Bisulfate 75 MG Oral Tablet; Therapy: (Recorded:89Mfa5278) to Recorded   8  CoQ10 CAPS; Therapy: (Recorded:18Jan2016) to Recorded   9  Isosorbide Mononitrate ER 60 MG Oral Tablet Extended Release 24 Hour; TAKE 1 TABLET ONCE DAILY; Therapy: 04KHQ6214 to Recorded   10  Lisinopril 20 MG Oral Tablet; TAKE 1 TABLET TWICE DAILY  Requested for: 61XHF8800; Last  Rx:24Nov2017 Ordered   11  MetFORMIN HCl - 1000 MG Oral Tablet; TAKE 0 5 TABLET Daily at dinner time; Therapy: (Recorded:40Gse6070) to Recorded   12  Metoprolol Tartrate 25 MG Oral Tablet; TAKES HALF A TABLET TWICE A DAY; Therapy: 66YPB5760 to (Hermila Celis) Recorded   13   Pantoprazole Sodium 40 MG Oral Tablet Delayed Release; take 1 tablet by mouth once daily; Therapy: 85WTY4259 to (Noralee Schwab)  Requested for: 85Ghc7565 Recorded   14  Vitamin B12 TABS; Therapy: (Recorded:03Vxs5969) to Recorded  Medication List Reviewed: The medication list was reviewed and updated today  Allergies  1  No Known Drug Allergies    Vitals  Vital Signs    Recorded: 98IED3556 10:37AM   Temperature 96 7 F   Heart Rate 68   Respiration 18   Systolic 856   Diastolic 80   Height 5 ft 7 in   Weight 203 lb    BMI Calculated 31 79   BSA Calculated 2 03       Physical Exam   Constitutional  General appearance: No acute distress, well appearing and well nourished  -- WDWN MALE IN NAD  Eyes  Conjunctiva and lids: No swelling, erythema, or discharge  Pupils and irises: Equal, round and reactive to light  Ears, Nose, Mouth, and Throat  External inspection of ears and nose: Normal    Otoscopic examination: Tympanic membrance translucent with normal light reflex  Canals patent without erythema  -- DECREAED HEARING B/L WITH SCREENING TEST DONE IN OFFICE  Nasal mucosa, septum, and turbinates: Normal without edema or erythema  The nasal cavity was examined using a speculum  no nasal discharge,-- normal nasal mucosa,-- normal nasal septum,-- no intranasal masses or polyps-- and-- normal nasal turbinates  Oropharynx: Normal with no erythema, edema, exudate or lesions  Pulmonary  Respiratory effort: No increased work of breathing or signs of respiratory distress  Auscultation of lungs: Abnormal   Auscultation of the lungs revealed no expiratory wheezing,-- normal expiratory time-- and-- no inspiratory wheezing  no rales or crackles were heard bilaterally  no rhonchi  no friction rub  no wheezing  diminished breath sounds over both bases  no bronchial breath sounds  Cardiovascular  Palpation of heart: Normal PMI, no thrills  Auscultation of heart: Normal rate and rhythm, normal S1 and S2, without murmurs   -- NO S3 NOTED  Examination of extremities for edema and/or varicosities: Normal    Carotid pulses: Normal    Abdomen  Abdomen: Non-tender, no masses  Liver and spleen: No hepatomegaly or splenomegaly  -- NO CVA TENDERNESS  Lymphatic  Palpation of lymph nodes in neck: No lymphadenopathy  Musculoskeletal  Gait and station: Normal    Digits and nails: Normal without clubbing or cyanosis  Inspection/palpation of joints, bones, and muscles: Normal    Skin  Skin and subcutaneous tissue: Normal without rashes or lesions  Neurologic  Cranial nerves: Cranial nerves 2-12 intact  Reflexes: 2+ and symmetric  Sensation: No sensory loss  Psychiatric  Orientation to person, place and time: Normal    Mood and affect: Normal    Diabetic Foot Screen: Normal        Health Management  History of Diabetes mellitus   (1) HEMOGLOBIN A1C; every 3 months; Last 01Aug2017; Next Due: 49LXJ7784; Overdue  (1) MICROALBUMIN, 24HR URINE; every 1 year; Next Due: 30AAJ9853; Overdue  *VB - Foot Exam; every 1 year; Last 01Aug2017; Next Due: 55Hsh1583; Active  Health Maintenance   COLONOSCOPY; every 10 years; Last 08LSE0189; Next Due: 00IBM9648; Active    Future Appointments    Date/Time Provider Specialty Site   04/09/2018 10:00 AM aVnessa Velez, DO Internal Medicine [id number] [facility]   02/19/2018 05:30 PM Deana Orellana MD Neurology [address]       Signatures   Electronically signed by :  Jayla Thornton [facility]; Dec  4 2017 12:49PM EST                       (Author)

## 2017-12-06 DIAGNOSIS — E11.65 TYPE 2 DIABETES MELLITUS WITH HYPERGLYCEMIA (HCC): ICD-10-CM

## 2017-12-12 ENCOUNTER — HOSPITAL ENCOUNTER (OUTPATIENT)
Dept: SLEEP CENTER | Facility: CLINIC | Age: 72
Discharge: HOME | End: 2017-12-12
Payer: MEDICARE

## 2017-12-12 DIAGNOSIS — G47.33 OSA (OBSTRUCTIVE SLEEP APNEA): ICD-10-CM

## 2017-12-12 PROCEDURE — 95811 POLYSOM 6/>YRS CPAP 4/> PARM: CPT

## 2017-12-13 DIAGNOSIS — E11.65 TYPE 2 DIABETES MELLITUS WITH HYPERGLYCEMIA (HCC): ICD-10-CM

## 2017-12-18 ENCOUNTER — HOSPITAL ENCOUNTER (OUTPATIENT)
Dept: SLEEP CENTER | Facility: CLINIC | Age: 72
Discharge: HOME | End: 2017-12-18
Payer: MEDICARE

## 2017-12-18 ENCOUNTER — TRANSCRIBE ORDERS (OUTPATIENT)
Dept: SLEEP CENTER | Facility: CLINIC | Age: 72
End: 2017-12-18

## 2017-12-18 DIAGNOSIS — G47.33 OSA (OBSTRUCTIVE SLEEP APNEA): Primary | ICD-10-CM

## 2017-12-18 DIAGNOSIS — G47.33 OSA (OBSTRUCTIVE SLEEP APNEA): ICD-10-CM

## 2017-12-28 ENCOUNTER — ALLSCRIPTS OFFICE VISIT (OUTPATIENT)
Dept: OTHER | Facility: OTHER | Age: 72
End: 2017-12-28

## 2018-01-10 NOTE — RESULT NOTES
Discussion/Summary    Normal esophagogram    ADVISED PT  CS         Verified Results  [facility] BARIUM SWALLOW [id number] 08:07AM Letty Wilson Order Number: [id number]    - Patient Instructions: To schedule this appointment, please contact Central Scheduling at [phone]  Test Name Result Flag Reference   FL ESOPHAGRAM COMPLETE (Report)     BARIUM SWALLOW-ESOPHAGRAM     INDICATION: Difficulty swallowing  Sensation of food stuck in throat  Coughing after meals     COMPARISON: None     IMAGES: 134     FLUOROSCOPY TIME:  1 5 minutes      TECHNIQUE:   The patient was given effervescent granules and barium by mouth and images of the esophagus were obtained  FINDINGS:     The esophagus is normal in caliber  There was minimal esophageal dysmotility with evidence of minor spasms of the lower 3rd of the esophagus during drinking, but there was no significant delay in transit of contrast  The contour of the esophagus seems    somewhat tortuous mostly when the patient is supine  No mucosal lesion, ulceration or evidence of fold thickening is seen  Gastroesophageal reflux was not observed  There is no hiatal hernia  IMPRESSION:     Essentially normal study  Minimal esophageal dysmotility without delay in transit         Workstation performed: [id number]     Signed by:   Shon Buerger, MD   6/1/17

## 2018-01-11 NOTE — PROGRESS NOTES
History of Present Illness  Care Coordination Encounter Information:   Type of Encounter: Telephonic   Contact: Follow-Up    Spoke to Adult Child   Lili  Care Coordination  Nurse [address] [place] [address] [address]:   The reason for call is to discuss outreach for follow up/needed services  Doing good  D/C home 9/29/17 s/p cardiac cath  Currently has home VNA and will be starting cardiac rehab next week  Denies any complaints of chest pain, increased shortness of breath, weakness or fatigue  Denies recent falls  Recently discharged from home PT and feels he is ambulating well  Continuing to check his blood sugar daily  Last blood sugar 87  All prescriptions filled  Denies any needs or concerns at this time  Encouraged to call if needed  Active Problems    1  Abdominal pain, acute, epigastric (789 06,338 19) (R10 13)   2  Acquired polyneuropathy (356 9) (G62 9)   3  Anemia (285 9) (D64 9)   4  Aneurysm of infrarenal abdominal aorta (441 4) (I71 4)   5  Arteriosclerotic cardiovascular disease (429 2,440 9) (I25 10)   6  Benign essential hypertension (401 1) (I10)   7  Bilateral hearing loss, unspecified hearing loss type (389 9) (H91 93)   8  Bilateral impacted cerumen (380 4) (H61 23)   9  Cervical disc disorder with radiculopathy of mid-cervical region (723 4) (M50 120)   10  Cervical radicular pain (723 4) (M54 12)   11  Diabetes type 2, uncontrolled (250 02) (E11 65)   12  Dizziness (780 4) (R42)   13  Essential tremor (333 1) (G25 0)   14  Frequent falls (V15 88) (R29 6)   15  Gait disturbance (781 2) (R26 9)   16  GERD (gastroesophageal reflux disease) (530 81) (K21 9)   17  Hyperlipidemia (272 4) (E78 5)   18  Murmur (785 2) (R01 1)   19  Myofascial muscle pain (729 1) (M79 1)   20  Need for influenza vaccination (V04 81) (Z23)   21  Onychomycosis (110 1) (B35 1)   22  Peripheral vascular disease (443 9) (I73 9)   23  Pharyngoesophageal dysphagia (787 24) (R13 14)   24   Sensorineural hearing loss, bilateral (389 18) (H90 3)   25  Sleep disturbances (780 50) (G47 9)   26  Thoracic ascending aortic aneurysm (441 2) (I71 2)   27  Transition of care   28  Trapezius muscle spasm (728 85) (T29 855)    Past Medical History    1  History of Angiographic Coronary Findings   2  History of shortness of breath (V13 89) (Z87 898)   3  History of Medicare annual wellness visit, subsequent (V70 0) (Z00 00)   4  History of Thoracic ascending aortic aneurysm (441 2) (I71 2)   5  History of Transition of care    Surgical History    1  History of Hernia Repair   2  History of Surgery For Popliteal Artery Aneurysm    Social History    · Former smoker (D10 01) (W54 035)   · Stopped Drinking Alcohol    Current Meds    1  Famotidine 40 MG Oral Tablet; 1 TAB AT SUPPER FOR ABDOMINAL PAIN- DO NOT TAKE   WITH OTHER MEDS; Therapy: 19TQM3682 to (Evaluate:08Apr2018)  Requested for: 68JDG9610; Last   Rx:10Oct2017 Ordered    2  Aspirin 81 MG TABS; TAKE 1 TABLET DAILY; Therapy: 50EKK8415 to Recorded   3  Isosorbide Mononitrate ER 60 MG Oral Tablet Extended Release 24 Hour; TAKE 1   TABLET ONCE DAILY; Therapy: 73XOE3478 to Recorded   4  MetFORMIN HCl - 1000 MG Oral Tablet; TAKE 1 TABLET TWICE DAILY; Therapy: 47YTJ3021 to (Harish Moreno) Recorded   5  Metoprolol Tartrate 25 MG Oral Tablet; TAKES HALF A TABLET TWICE A DAY; Therapy: 33KIL7254 to (Harish Moreno) Recorded    6  Lisinopril 20 MG Oral Tablet; TAKE 1 TABLET TWICE DAILY  Requested for: 88Pqw4330;   Last Rx:25Vjg7509 Ordered    7  Atorvastatin Calcium 40 MG Oral Tablet; take 1 tablet by mouth every evening  Requested   for: 25Ans2740; Last Rx:04Cim8538 Ordered    8  Accu-Chek Barbara Plus In Vitro Strip; TEST TWICE DAILY; Therapy: 07Apr2017 to (Evaluate:39Toa8434); Last Rx:07Apr2017 Ordered   9  Accu-Chek Barbara Plus w/Device Kit; USE AS DIRECTED; Therapy: 30QDJ3532 to (Last Rx:07Apr2017) Ordered    10   Pantoprazole Sodium 40 MG Oral Tablet Delayed Release (Protonix); take 1 tablet by    mouth once daily; Therapy: 73MTD7552 to (468 4529)  Requested for: 23Odh5032 Recorded    11  CoQ10 CAPS; Therapy: (Recorded:18Jan2016) to Recorded   12  Vitamin B12 TABS; Therapy: (Recorded:18Jan2016) to Recorded    13  Methocarbamol 500 MG Oral Tablet; TAKE 0 5 to 1 TABLET Bedtime PRN muscle spasm; Therapy: 54TIU0690 to (Evaluate:12Lxh8424)  Requested for: 92CER6296; Last    Rx:46Fxh9085 Ordered    14  ALPRAZolam 0 5 MG Oral Tablet; 1/2 TAB IN AM AND 1 TAB AT NIGHT - CAN USE 1/2 TAB    MID DAY FOR PRN ANXIETY; Therapy: 21MHB3447 to (Evaluate:07Fau4362)  Requested for: 59WAR7473; Last    Rx:06Oct2017 Ordered    15  Citalopram Hydrobromide 20 MG Oral Tablet; 1 TAB MID DAY-DOSAGE CHANGE;    Therapy: 32KGU7790 to (Evaluate:15Oct2017)  Requested for: 18Apr2017; Last    Rx:18Apr2017 Ordered    16  Brilinta TABS; Therapy: (Recorded:06Oct2017) to Recorded   17  Depakote 250 MG Oral Tablet Delayed Release (Divalproex Sodium); Therapy: (Recorded:06Oct2017) to Recorded    Allergies    1  No Known Drug Allergies    Health Management   (1) HEMOGLOBIN A1C; every 3 months; Last 01Aug2017; Next Due: 75YKT3707; Active  (1) MICROALBUMIN, 24HR URINE; every 1 year; Next Due: 23RIX7450; Overdue  *VB - Foot Exam; every 1 year; Last 01Aug2017; Next Due: 51Nfc3514; Active   COLONOSCOPY; every 10 years; Last 31ESM3077; Next Due: 06HXY7287; Active    End of Encounter Meds    1  Famotidine 40 MG Oral Tablet; 1 TAB AT SUPPER FOR ABDOMINAL PAIN- DO NOT TAKE   WITH OTHER MEDS; Therapy: 51ZWS0698 to (Evaluate:08Apr2018)  Requested for: 41CUU3549; Last   Rx:10Oct2017 Ordered    2  Aspirin 81 MG TABS; TAKE 1 TABLET DAILY; Therapy: 46SSP9378 to Recorded   3  Isosorbide Mononitrate ER 60 MG Oral Tablet Extended Release 24 Hour; TAKE 1   TABLET ONCE DAILY; Therapy: 03AEW3773 to Recorded   4  MetFORMIN HCl - 1000 MG Oral Tablet; TAKE 1 TABLET TWICE DAILY;    Therapy: 67VGX2875 to (Ree Reasons) Recorded   5  Metoprolol Tartrate 25 MG Oral Tablet; TAKES HALF A TABLET TWICE A DAY; Therapy: 15KGH0563 to (Thor Ashraf) Recorded    6  Lisinopril 20 MG Oral Tablet; TAKE 1 TABLET TWICE DAILY  Requested for: 91Kpt5292;   Last Rx:50Usa6269 Ordered    7  Atorvastatin Calcium 40 MG Oral Tablet; take 1 tablet by mouth every evening  Requested   for: 12Acx6686; Last Rx:90Jyb5272 Ordered    8  Accu-Chek Barbara Plus In Vitro Strip; TEST TWICE DAILY; Therapy: 07Apr2017 to (Evaluate:65Mbo9601); Last Rx:07Apr2017 Ordered   9  Accu-Chek Barbara Plus w/Device Kit; USE AS DIRECTED; Therapy: 30WMY9252 to (Last Rx:07Apr2017) Ordered    10  Pantoprazole Sodium 40 MG Oral Tablet Delayed Release (Protonix); take 1 tablet by    mouth once daily; Therapy: 96AWL2179 to (26 [id number])  Requested for: 42Sqk8417 Recorded    11  CoQ10 CAPS; Therapy: (Recorded:18Jan2016) to Recorded   12  Vitamin B12 TABS; Therapy: (Recorded:18Jan2016) to Recorded    13  Methocarbamol 500 MG Oral Tablet; TAKE 0 5 to 1 TABLET Bedtime PRN muscle spasm; Therapy: 10UCK3523 to (Evaluate:90Lkt3532)  Requested for: 17ILZ4904; Last    Rx:39Muq9398 Ordered    14  ALPRAZolam 0 5 MG Oral Tablet; 1/2 TAB IN AM AND 1 TAB AT NIGHT - CAN USE 1/2 TAB    MID DAY FOR PRN ANXIETY; Therapy: 43RJP6316 to (Evaluate:39Cnu9178)  Requested for: 00BLP2390; Last    Rx:06Oct2017 Ordered    15  Citalopram Hydrobromide 20 MG Oral Tablet; 1 TAB MID DAY-DOSAGE CHANGE;    Therapy: 07ACK7071 to (Evaluate:15Oct2017)  Requested for: 18Apr2017; Last    Rx:18Apr2017 Ordered    16  Brilinta TABS; Therapy: (Recorded:06Oct2017) to Recorded   17  Depakote 250 MG Oral Tablet Delayed Release (Divalproex Sodium);     Therapy: (Recorded:06Oct2017) to Recorded    Future Appointments    Date/Time Provider Specialty Site   10/18/2017 10:00 AM Joey Velez DO Internal Medicine [address]   02/19/2018 05:30 PM Damir Ding MD Neurology [facility] [address]     Patient Care Team    Care Team Member Role Specialty Office Number   Gill Farrell MD Specialist Gastroenterology Adult [phone]   Jett Harman MD   [phone]     Signatures   Electronically signed by : Freda Tapia; Oct 13 2017 11:02AM EST                       (Author)

## 2018-01-11 NOTE — PROGRESS NOTES
History of Present Illness  Care Coordination Encounter Information:   Type of Encounter: Telephonic   Contact: Initial Contact    Spoke to Patient  Care Coordination  Nurse [address] [place] [address] [address]:   The reason for call is to discuss outreach for follow up/needed services  Doing good  DaughterMahesh, continuing to check blood sugars daily at home  Last blood sugar was 116  Has diabetic supplies  States fell one week ago  [place] that he turned too quickly and lost his balance  Denies any injuries  Does not use ambulatory aide  Discussed patient safety  Complains of pain to neck  Outpatient physical therapy placed on hold by Dr Henry Son due to neck pain  Inquiring if patient can restart PT  Spoke with Dr Henry Son and ok to resume  [address], will call and schedule at [facility]  All prescriptions filled  Denies any further concerns at this time  Encouraged to call if needed  Active Problems    1  Abdominal pain, acute, epigastric (789 06,338 19) (R10 13)   2  Acquired polyneuropathy (356 9) (G62 9)   3  Anemia (285 9) (D64 9)   4  Aneurysm of infrarenal abdominal aorta (441 4) (I71 4)   5  Arteriosclerotic cardiovascular disease (429 2,440 9) (I25 10)   6  Benign essential hypertension (401 1) (I10)   7  Bilateral hearing loss, unspecified hearing loss type (389 9) (H91 93)   8  Bilateral impacted cerumen (380 4) (H61 23)   9  Cervical radicular pain (723 4) (M54 12)   10  Diabetes type 2, uncontrolled (250 02) (E11 65)   11  Dizziness (780 4) (R42)   12  Essential tremor (333 1) (G25 0)   13  Frequent falls (V15 88) (R29 6)   14  Gait disturbance (781 2) (R26 9)   15  GERD (gastroesophageal reflux disease) (530 81) (K21 9)   16  Hyperlipidemia (272 4) (E78 5)   17  Murmur (785 2) (R01 1)   18  Onychomycosis (110 1) (B35 1)   19  Peripheral vascular disease (443 9) (I73 9)   20  Pharyngoesophageal dysphagia (787 24) (R13 14)   21  Sensorineural hearing loss, bilateral (389 18) (H90 3)   22   Sleep disturbances (780 50) (G47 9)   23  Thoracic ascending aortic aneurysm (441 2) (I71 2)   24  Transition of care   25  Trapezius muscle spasm (728 85) (U31 000)    Past Medical History    1  History of Angiographic Coronary Findings   2  History of shortness of breath (V13 89) (Z87 898)   3  History of Medicare annual wellness visit, subsequent (V70 0) (Z00 00)   4  History of Thoracic ascending aortic aneurysm (441 2) (I71 2)    Surgical History    1  History of Hernia Repair   2  History of Surgery For Popliteal Artery Aneurysm    Social History    · Former smoker (C13 93) (U30 606)   · Stopped Drinking Alcohol    Current Meds    1  Famotidine 40 MG Oral Tablet; 1 TAB AT SUPPER  FOR ABDOMINAL PAIN- DO NOT TAKE   WITH OTHER MEDS; Therapy: 04WZG1339 to (Evaluate:17Oct2017)  Requested for: 21Mar2017; Last   Rx:21Mar2017 Ordered    2  Aspirin 81 MG TABS; TAKE 1 TABLET DAILY; Therapy: 12FXD6630 to Recorded   3  Isosorbide Mononitrate ER 60 MG Oral Tablet Extended Release 24 Hour; TAKE 1   TABLET ONCE DAILY; Therapy: 15ZCN9134 to Recorded   4  MetFORMIN HCl - 1000 MG Oral Tablet; TAKE 1 TABLET TWICE DAILY; Therapy: 51BBI4195 to (Lanny Severe) Recorded   5  Metoprolol Tartrate 25 MG Oral Tablet; TAKES HALF A TABLET TWICE A DAY; Therapy: 30NBN7206 to (Lanny Severe) Recorded    6  Lisinopril 20 MG Oral Tablet; TAKE 1 TABLET TWICE DAILY  Requested for: 89Drm5163;   Last Rx:45Tnb2544 Ordered    7  Atorvastatin Calcium 40 MG Oral Tablet; take 1 tablet by mouth every evening  Requested   for: 23Tbu7261; Last Rx:02Kkd0432 Ordered    8  PredniSONE 10 MG Oral Tablet; 1 PO BID FOR 3 DAYS THEN 1/2 TAB BID FOR 3 DAYS; Therapy: 88Xps1417 to (Evaluate:94Xtv3928)  Requested for: 16Pir2906; Last   Rx:27Jdx2806 Ordered    9  Accu-Chek Barbara Plus In Vitro Strip; TEST TWICE DAILY; Therapy: 07Apr2017 to (Evaluate:21Rje0043); Last Rx:07Apr2017 Ordered   10   Accu-Chek Barbara Plus w/Device Kit; USE AS DIRECTED; Therapy: 25MVT1949 to (Last Rx:07Apr2017) Ordered    11  Pantoprazole Sodium 40 MG Oral Tablet Delayed Release (Protonix); take 1 tablet by    mouth once daily; Therapy: 88XCN3874 to ([place])  Requested for: 27Jul2017 Recorded    12  CoQ10 CAPS; Therapy: (Recorded:18Jan2016) to Recorded   13  Vitamin B12 TABS; Therapy: (Recorded:18Jan2016) to Recorded    14  ALPRAZolam 0 5 MG Oral Tablet; 1/2 TAB IN AM AND 1 TAB AT NIGHT - CAN USE 1/2 TAB    MID DAY FOR PRN ANXIETY; Therapy: 91AMI3089 to (Evaluate:07Zco1454)  Requested for: 50ZJU0486; Last    Rx:70Fdk1342 Ordered    15  Citalopram Hydrobromide 20 MG Oral Tablet; 1 TAB MID DAY-DOSAGE CHANGE;    Therapy: 82FQY7395 to (Evaluate:15Oct2017)  Requested for: 18Apr2017; Last    Rx:18Apr2017 Ordered    16  Clopidogrel Bisulfate 75 MG Oral Tablet; TAKE 1 TABLET DAILY; Therapy: (Recorded:06Nov2014) to Recorded    Allergies    1  No Known Drug Allergies    Health Management   (1) HEMOGLOBIN A1C; every 3 months; Last 01Aug2017; Next Due: 15FEV0863; Active  (1) MICROALBUMIN, 24HR URINE; every 1 year; Next Due: 72LMJ6195; Overdue  *VB - Foot Exam; every 1 year; Last 10Gea6670; Next Due: 21Gow6217; Active   COLONOSCOPY; every 10 years; Last 68MQC0995; Next Due: 78ZSG1841; Active    End of Encounter Meds    1  Famotidine 40 MG Oral Tablet; 1 TAB AT SUPPER  FOR ABDOMINAL PAIN- DO NOT TAKE   WITH OTHER MEDS; Therapy: 02VJZ4375 to (Evaluate:17Oct2017)  Requested for: 21Mar2017; Last   Rx:21Mar2017 Ordered    2  Aspirin 81 MG TABS; TAKE 1 TABLET DAILY; Therapy: 24FBN3134 to Recorded   3  Isosorbide Mononitrate ER 60 MG Oral Tablet Extended Release 24 Hour; TAKE 1   TABLET ONCE DAILY; Therapy: 51ZYO8944 to Recorded   4  MetFORMIN HCl - 1000 MG Oral Tablet; TAKE 1 TABLET TWICE DAILY; Therapy: 07GTW2849 to (Erlene Keep) Recorded   5  Metoprolol Tartrate 25 MG Oral Tablet; TAKES HALF A TABLET TWICE A DAY;    Therapy: 42MFD4274 to (Massiel Rocha) Recorded    6  Lisinopril 20 MG Oral Tablet; TAKE 1 TABLET TWICE DAILY  Requested for: 31Dpq6746;   Last Rx:25Kdj9448 Ordered    7  Atorvastatin Calcium 40 MG Oral Tablet; take 1 tablet by mouth every evening  Requested   for: 38Wmg2777; Last Rx:34Xiz4626 Ordered    8  PredniSONE 10 MG Oral Tablet; 1 PO BID FOR 3 DAYS THEN 1/2 TAB BID FOR 3 DAYS; Therapy: 25Wri9754 to (Evaluate:05Aga9536)  Requested for: 66Xwk6691; Last   Rx:72Ubh3473 Ordered    9  Accu-Chek Barbara Plus In Vitro Strip; TEST TWICE DAILY; Therapy: 07Apr2017 to (Evaluate:80Rpy5516); Last Rx:07Apr2017 Ordered   10  Accu-Chek Barbara Plus w/Device Kit; USE AS DIRECTED; Therapy: 26AAO5290 to (Last Rx:07Apr2017) Ordered    11  Pantoprazole Sodium 40 MG Oral Tablet Delayed Release (Protonix); take 1 tablet by    mouth once daily; Therapy: 43XAP3516 to (Arby Merlin)  Requested for: 27Jul2017 Recorded    12  CoQ10 CAPS; Therapy: (Recorded:18Jan2016) to Recorded   13  Vitamin B12 TABS; Therapy: (Recorded:18Jan2016) to Recorded    14  ALPRAZolam 0 5 MG Oral Tablet; 1/2 TAB IN AM AND 1 TAB AT NIGHT - CAN USE 1/2 TAB    MID DAY FOR PRN ANXIETY; Therapy: 51TUL6857 to (Evaluate:58Med8951)  Requested for: 64DQR5365; Last    Rx:00Mxz3785 Ordered    15  Citalopram Hydrobromide 20 MG Oral Tablet; 1 TAB MID DAY-DOSAGE CHANGE;    Therapy: 63TTX6174 to (Evaluate:15Oct2017)  Requested for: 18Apr2017; Last    Rx:18Apr2017 Ordered    16  Clopidogrel Bisulfate 75 MG Oral Tablet; TAKE 1 TABLET DAILY;     Therapy: (Recorded:06Nov2014) to Recorded    Future Appointments    Date/Time Provider Specialty Site   10/18/2017 10:00 AM Leodan Velez, DO Internal Medicine [address]   02/19/2018 05:30 PM Sherif Hyatt MD Neurology [facility]  [address]   09/22/2017 11:00 AM Matthieu Cordon MD Pain Management [facility] 15     Patient Care Team    Care Team Member Role Specialty Office Number   Haydee [place] MD Specialist Gastroenterology Adult [phone]   Katey [place] MD   [phone]     Signatures   Electronically signed by : Rakan Reddy; Aug 22 2017  4:31PM EST                       (Author)

## 2018-01-12 VITALS
SYSTOLIC BLOOD PRESSURE: 126 MMHG | HEART RATE: 65 BPM | BODY MASS INDEX: 31.45 KG/M2 | WEIGHT: 200.38 LBS | TEMPERATURE: 97.7 F | DIASTOLIC BLOOD PRESSURE: 82 MMHG | RESPIRATION RATE: 18 BRPM | HEIGHT: 67 IN | OXYGEN SATURATION: 98 %

## 2018-01-12 VITALS
WEIGHT: 195 LBS | OXYGEN SATURATION: 98 % | HEIGHT: 67 IN | BODY MASS INDEX: 30.61 KG/M2 | DIASTOLIC BLOOD PRESSURE: 80 MMHG | RESPIRATION RATE: 16 BRPM | SYSTOLIC BLOOD PRESSURE: 126 MMHG | HEART RATE: 72 BPM | TEMPERATURE: 97.8 F

## 2018-01-12 VITALS
SYSTOLIC BLOOD PRESSURE: 128 MMHG | BODY MASS INDEX: 30.88 KG/M2 | WEIGHT: 196.75 LBS | HEIGHT: 67 IN | RESPIRATION RATE: 14 BRPM | HEART RATE: 74 BPM | OXYGEN SATURATION: 97 % | DIASTOLIC BLOOD PRESSURE: 82 MMHG

## 2018-01-13 VITALS
TEMPERATURE: 97.4 F | WEIGHT: 197.6 LBS | SYSTOLIC BLOOD PRESSURE: 160 MMHG | HEART RATE: 68 BPM | RESPIRATION RATE: 18 BRPM | DIASTOLIC BLOOD PRESSURE: 110 MMHG | HEIGHT: 67 IN | BODY MASS INDEX: 31.01 KG/M2

## 2018-01-13 VITALS
WEIGHT: 198 LBS | BODY MASS INDEX: 31.08 KG/M2 | SYSTOLIC BLOOD PRESSURE: 124 MMHG | DIASTOLIC BLOOD PRESSURE: 72 MMHG | RESPIRATION RATE: 14 BRPM | HEIGHT: 67 IN | HEART RATE: 80 BPM

## 2018-01-13 VITALS
HEART RATE: 64 BPM | RESPIRATION RATE: 16 BRPM | SYSTOLIC BLOOD PRESSURE: 112 MMHG | WEIGHT: 203 LBS | DIASTOLIC BLOOD PRESSURE: 70 MMHG | BODY MASS INDEX: 31.86 KG/M2 | TEMPERATURE: 95.9 F | HEIGHT: 67 IN

## 2018-01-13 NOTE — PROGRESS NOTES
History of Present Illness  Care Coordination Encounter Information:   Type of Encounter: Telephonic   Contact: Initial Contact    Spoke to Patient and Adult Child   Lili  Care Coordination  Nurse [address] [place] [address] [address]:   The reason for call is to discuss outreach for follow up/needed services  Doing good  Recent discharge from the hospital s/p chest pain and left sided weakness  Had follow up with cardiology  Continues to complain of occasional chest pressure, but denies any shortness of breath  Patient lives with daughter, Maisha Christensen  She helps patient to check his blood pressure and blood sugars at home and keeps a log  Blood pressure not checked in a couple of days, but has been good  Blood sugars checked daily with the last being 128  Patient fell prior to PCP visit on 5/31/17  Denies any falls since that time  Does not use ambulatory device  Denies complaints of dizziness, lightheadedness or weakness at this time  Has follow up with neurology tomorrow and will be starting physical therapy at [facility]  Denies pain  All prescriptions filled  Denies any questions or concerns  Active Problems    1  Abdominal pain, acute, epigastric (789 06,338 19) (R10 13)   2  Acquired polyneuropathy (356 9) (G62 9)   3  Anemia (285 9) (D64 9)   4  Aneurysm of infrarenal abdominal aorta (441 4) (I71 4)   5  Arteriosclerotic cardiovascular disease (429 2,440 9) (I25 10)   6  Benign essential hypertension (401 1) (I10)   7  Diabetes type 2, uncontrolled (250 02) (E11 65)   8  Essential tremor (333 1) (G25 0)   9  Frequent falls (V15 88) (R29 6)   10  Gait disturbance (781 2) (R26 9)   11  GERD (gastroesophageal reflux disease) (530 81) (K21 9)   12  Hyperlipidemia (272 4) (E78 5)   13  Murmur (785 2) (R01 1)   14  Onychomycosis (110 1) (B35 1)   15  Peripheral vascular disease (443 9) (I73 9)   16  Pharyngoesophageal dysphagia (787 24) (R13 14)   17  Sleep disturbances (780 50) (G47 9)   18   Thoracic ascending aortic aneurysm (441 2) (I71 2)   19  Transition of care    Past Medical History    1  History of Angiographic Coronary Findings   2  History of shortness of breath (V13 89) (Z87 898)   3  History of Medicare annual wellness visit, subsequent (V70 0) (Z00 00)   4  History of Thoracic ascending aortic aneurysm (441 2) (I71 2)    Surgical History    1  History of Hernia Repair   2  History of Surgery For Popliteal Artery Aneurysm    Social History    · Former smoker (U62 86) (U15 509)   · Stopped Drinking Alcohol    Current Meds    1  Famotidine 40 MG Oral Tablet; 1 TAB AT SUPPER  FOR ABDOMINAL PAIN- DO NOT TAKE   WITH OTHER MEDS; Therapy: 26HSU3976 to (Evaluate:17Oct2017)  Requested for: 21Mar2017; Last   Rx:21Mar2017 Ordered    2  Aspirin 81 MG TABS; TAKE 1 TABLET DAILY; Therapy: 89OGD3219 to Recorded   3  Isosorbide Mononitrate ER 60 MG Oral Tablet Extended Release 24 Hour; TAKE 1   TABLET ONCE DAILY; Therapy: 08FAK1532 to Recorded   4  MetFORMIN HCl - 1000 MG Oral Tablet; TAKE 1 TABLET TWICE DAILY; Therapy: 05VTG9786 to ([facility]) Recorded   5  Metoprolol Tartrate 25 MG Oral Tablet; TAKES HALF A TABLET TWICE A DAY; Therapy: 43OAN0044 to ([facility]) Recorded    6  Lisinopril 20 MG Oral Tablet; TAKE 1 TABLET TWICE DAILY; Therapy: (Recorded:02Jun2017) to Recorded    7  Atorvastatin Calcium 40 MG Oral Tablet; Therapy: (Recorded:91Fsx9389) to Recorded    8  Accu-Chek Barbara Plus In Vitro Strip; TEST TWICE DAILY; Therapy: 07Apr2017 to (Evaluate:29Kop0491); Last Rx:07Apr2017 Ordered   9  Accu-Chek Barbara Plus w/Device Kit; USE AS DIRECTED; Therapy: 45UGH8645 to (Last Rx:07Apr2017) Ordered    10  Carbidopa-Levodopa  MG Oral Tablet; TAKE 1 TABLET IN THE AM FOR    TREMORS;    Therapy: 19Bdt6699 to (Evaluate:89Jnl6606)  Requested for: 19JRG3001; Last    Rx:20Mar2017 Ordered    11  Pantoprazole Sodium 40 MG Oral Tablet Delayed Release (Protonix); take 1 tablet twice    a day;     Therapy: 20NUZ2440 to (QCRGMUKO:61UUL5894)  Requested for: 01Apr2017; Last    Rx:01Apr2017 Ordered    12  CoQ10 CAPS; Therapy: (Recorded:18Jan2016) to Recorded   13  Vitamin B12 TABS; Therapy: (Recorded:18Jan2016) to Recorded    14  ALPRAZolam 1 MG Oral Tablet; 1/2 TAB Q 6-8 HOURS FOR ANXIETY- NO DRIVING WITH    THE MEDICATION;  NO ALCOHOL; Therapy: 63BEQ2626 to (Evaluate:10Blh6346)  Requested for: 06ZMR2231; Last    Rx:31May2017 Ordered    15  Citalopram Hydrobromide 20 MG Oral Tablet; 1 TAB MID DAY-DOSAGE CHANGE;    Therapy: 14UAW4735 to (Evaluate:15Oct2017)  Requested for: 18Apr2017; Last    Rx:18Apr2017 Ordered    16  Clopidogrel Bisulfate 75 MG Oral Tablet; TAKE 1 TABLET DAILY; Therapy: (Recorded:06Nov2014) to Recorded   17  Docusate Sodium 100 MG Oral Capsule; TAKE 1 CAPSULE DAILY; Therapy: (Recorded:06Nov2014) to Recorded    Allergies    1  No Known Drug Allergies    Health Management   (1) HEMOGLOBIN A1C; every 3 months; Last 50YTJ8805; Next Due: 23XML3439; Overdue  (1) MICROALBUMIN, 24HR URINE; every 1 year; Next Due: 89VMV7702; Overdue  *VB - Foot Exam; every 1 year; Next Due: 07KJC2439; Overdue   COLONOSCOPY; every 10 years; Last 19ZRP0356; Next Due: 14SPY1060; Active    End of Encounter Meds    1  Famotidine 40 MG Oral Tablet; 1 TAB AT SUPPER  FOR ABDOMINAL PAIN- DO NOT TAKE   WITH OTHER MEDS; Therapy: 85RXW5336 to (Evaluate:17Oct2017)  Requested for: 21Mar2017; Last   Rx:21Mar2017 Ordered    2  Aspirin 81 MG TABS; TAKE 1 TABLET DAILY; Therapy: 58PRV6671 to Recorded   3  Isosorbide Mononitrate ER 60 MG Oral Tablet Extended Release 24 Hour; TAKE 1   TABLET ONCE DAILY; Therapy: 12NGR6634 to Recorded   4  MetFORMIN HCl - 1000 MG Oral Tablet; TAKE 1 TABLET TWICE DAILY; Therapy: 44DCB5048 to (Julian Shen) Recorded   5  Metoprolol Tartrate 25 MG Oral Tablet; TAKES HALF A TABLET TWICE A DAY; Therapy: 02ZKK2544 to (Julian Shen) Recorded    6   Lisinopril 20 MG Oral Tablet; TAKE 1 TABLET TWICE DAILY; Therapy: (Recorded:02Jun2017) to Recorded    7  Atorvastatin Calcium 40 MG Oral Tablet; Therapy: (Recorded:31May2017) to Recorded    8  Accu-Chek Barbara Plus In Vitro Strip; TEST TWICE DAILY; Therapy: 07Apr2017 to (Evaluate:27May2017); Last Rx:07Apr2017 Ordered   9  Accu-Chek Barbara Plus w/Device Kit; USE AS DIRECTED; Therapy: 01CFN5728 to (Last Rx:07Apr2017) Ordered    10  Carbidopa-Levodopa  MG Oral Tablet; TAKE 1 TABLET IN THE AM FOR    TREMORS;    Therapy: 02Tht3163 to (Evaluate:83Fse1621)  Requested for: 78VQU9641; Last    Rx:20Mar2017 Ordered    11  Pantoprazole Sodium 40 MG Oral Tablet Delayed Release (Protonix); take 1 tablet twice    a day; Therapy: 02ZSD7603 to (QTCQLAQX:97PWG0639)  Requested for: 01Apr2017; Last    Rx:01Apr2017 Ordered    12  CoQ10 CAPS; Therapy: (Recorded:18Jan2016) to Recorded   13  Vitamin B12 TABS; Therapy: (Recorded:18Jan2016) to Recorded    14  ALPRAZolam 1 MG Oral Tablet; 1/2 TAB Q 6-8 HOURS FOR ANXIETY- NO DRIVING WITH    THE MEDICATION;  NO ALCOHOL; Therapy: 43CMZ3596 to (Evaluate:81Vhw2633)  Requested for: 32EZF4244; Last    Rx:31May2017 Ordered    15  Citalopram Hydrobromide 20 MG Oral Tablet; 1 TAB MID DAY-DOSAGE CHANGE;    Therapy: 18IFP6321 to (Evaluate:15Oct2017)  Requested for: 18Apr2017; Last    Rx:18Apr2017 Ordered    16  Clopidogrel Bisulfate 75 MG Oral Tablet; TAKE 1 TABLET DAILY; Therapy: (Recorded:06Nov2014) to Recorded   17  Docusate Sodium 100 MG Oral Capsule; TAKE 1 CAPSULE DAILY;     Therapy: [phone]) to Recorded    Future Appointments    Date/Time Provider Specialty Site   06/08/2017 11:15 AM Joce Hinojosa [facility] Neurology [place] [address]     Patient Care Team    Care Team Member Role Specialty Office Number   Jeneal Simind MD Specialist Gastroenterology Adult [phone]   Beti Gardiner MD   [phone]     Signatures   Electronically signed by : Yan Bland; Jun 7 2017 12:50PM EST (Author)

## 2018-01-14 VITALS
HEIGHT: 67 IN | HEART RATE: 65 BPM | SYSTOLIC BLOOD PRESSURE: 100 MMHG | DIASTOLIC BLOOD PRESSURE: 60 MMHG | OXYGEN SATURATION: 94 % | BODY MASS INDEX: 31.16 KG/M2 | WEIGHT: 198.5 LBS

## 2018-01-14 VITALS
BODY MASS INDEX: 31.23 KG/M2 | SYSTOLIC BLOOD PRESSURE: 128 MMHG | HEART RATE: 64 BPM | TEMPERATURE: 97.2 F | RESPIRATION RATE: 16 BRPM | WEIGHT: 199 LBS | DIASTOLIC BLOOD PRESSURE: 76 MMHG | HEIGHT: 67 IN

## 2018-01-14 NOTE — PROGRESS NOTES
History of Present Illness  Care Coordination Encounter Information:   Type of Encounter: Telephonic   Contact: Follow-Up    Spoke to Patient  Care Coordination SL Nurse [facility]:   The reason for call is to discuss outreach for follow up/needed services  Doing good  Started outpatient physical therapy at [facility]  Has had two sessions  Continues to check blood sugars daily with the last being 119 before breakfast  States had recent fall on Sunday  States that he was walking in the yard and tripped over a branch  Denies hitting head  Denies any injuries  Denies weakness, lightheaded, dizziness  Denies pain  Does not use ambulatory device  Discussed patient safety  Complains of nausea and vomiting starting this morning  States "I think it is something I ate " Feels better as the day progresses  Encouraged to call PCP if not improving  All prescriptions filled  Denies any questions or concerns at this time  Active Problems    1  Abdominal pain, acute, epigastric (789 06,338 19) (R10 13)   2  Acquired polyneuropathy (356 9) (G62 9)   3  Anemia (285 9) (D64 9)   4  Aneurysm of infrarenal abdominal aorta (441 4) (I71 4)   5  Arteriosclerotic cardiovascular disease (429 2,440 9) (I25 10)   6  Benign essential hypertension (401 1) (I10)   7  Diabetes type 2, uncontrolled (250 02) (E11 65)   8  Essential tremor (333 1) (G25 0)   9  Frequent falls (V15 88) (R29 6)   10  Gait disturbance (781 2) (R26 9)   11  GERD (gastroesophageal reflux disease) (530 81) (K21 9)   12  Hyperlipidemia (272 4) (E78 5)   13  Murmur (785 2) (R01 1)   14  Onychomycosis (110 1) (B35 1)   15  Peripheral vascular disease (443 9) (I73 9)   16  Pharyngoesophageal dysphagia (787 24) (R13 14)   17  Sleep disturbances (780 50) (G47 9)   18  Thoracic ascending aortic aneurysm (441 2) (I71 2)   19  Transition of care    Past Medical History    1  History of Angiographic Coronary Findings   2   History of shortness of breath (V13 89) (P53 539)   3  History of Medicare annual wellness visit, subsequent (V70 0) (Z00 00)   4  History of Thoracic ascending aortic aneurysm (441 2) (I71 2)    Surgical History    1  History of Hernia Repair   2  History of Surgery For Popliteal Artery Aneurysm    Social History    · Former smoker (I13 09) (A43 490)   · Stopped Drinking Alcohol    Current Meds    1  Famotidine 40 MG Oral Tablet; 1 TAB AT SUPPER  FOR ABDOMINAL PAIN- DO NOT TAKE   WITH OTHER MEDS; Therapy: 28TKS2543 to (Evaluate:17Oct2017)  Requested for: 21Mar2017; Last   Rx:21Mar2017 Ordered    2  Aspirin 81 MG TABS; TAKE 1 TABLET DAILY; Therapy: 02BMG9478 to Recorded   3  Isosorbide Mononitrate ER 60 MG Oral Tablet Extended Release 24 Hour; TAKE 1   TABLET ONCE DAILY; Therapy: 63IPO5766 to Recorded   4  MetFORMIN HCl - 1000 MG Oral Tablet; TAKE 1 TABLET TWICE DAILY; Therapy: 57IOK1201 to (Jethro Barthel) Recorded   5  Metoprolol Tartrate 25 MG Oral Tablet; TAKES HALF A TABLET TWICE A DAY; Therapy: 86ZTN6327 to (Jethro Barthel) Recorded    6  Lisinopril 20 MG Oral Tablet; TAKE 1 TABLET TWICE DAILY; Therapy: (Recorded:02Jun2017) to Recorded    7  Atorvastatin Calcium 40 MG Oral Tablet; Therapy: (Recorded:31May2017) to Recorded    8  Accu-Chek Barbara Plus In Vitro Strip; TEST TWICE DAILY; Therapy: 07Apr2017 to (Evaluate:67Bhs3833); Last Rx:07Apr2017 Ordered   9  Accu-Chek Barbara Plus w/Device Kit; USE AS DIRECTED; Therapy: 95MJI3310 to (Last Rx:07Apr2017) Ordered    10  Gabapentin 100 MG Oral Capsule (Neurontin); Take 1tab qhs, x 1 week then 1tab bid x 1    week then 1tab tid; Therapy: 31JVM8457 to (Last Rx:08Jun2017)  Requested for: 21ZUP7986 Ordered    11  Pantoprazole Sodium 40 MG Oral Tablet Delayed Release (Protonix); take 1 tablet twice    a day; Therapy: 26RRF6423 to (IWCPPOTR:65NMU5481)  Requested for: 01Apr2017; Last    Rx:01Apr2017 Ordered    12  CoQ10 CAPS; Therapy: (Recorded:18Jan2016) to Recorded   13   Vitamin B12 TABS; Therapy: (Recorded:18Jan2016) to Recorded    14  ALPRAZolam 1 MG Oral Tablet; 1/2 TAB Q 6-8 HOURS FOR ANXIETY- NO DRIVING WITH    THE MEDICATION;  NO ALCOHOL; Therapy: 24UGQ5719 to (Evaluate:30Jul2017)  Requested for: 30JAB4216; Last    Rx:31May2017 Ordered    15  Citalopram Hydrobromide 20 MG Oral Tablet; 1 TAB MID DAY-DOSAGE CHANGE;    Therapy: 41SPZ4688 to (Evaluate:15Oct2017)  Requested for: 18Apr2017; Last    Rx:18Apr2017 Ordered    16  Clopidogrel Bisulfate 75 MG Oral Tablet; TAKE 1 TABLET DAILY; Therapy: (Recorded:06Nov2014) to Recorded    Allergies    1  No Known Drug Allergies    Health Management   (1) HEMOGLOBIN A1C; every 3 months; Last 68QWY7088; Next Due: 24WIE1383; Overdue  (1) MICROALBUMIN, 24HR URINE; every 1 year; Next Due: 15JIA1555; Overdue  *VB - Foot Exam; every 1 year; Next Due: 20PCS8624; Overdue   COLONOSCOPY; every 10 years; Last 64WMY4076; Next Due: 50MIT3201; Active    End of Encounter Meds    1  Famotidine 40 MG Oral Tablet; 1 TAB AT SUPPER  FOR ABDOMINAL PAIN- DO NOT TAKE   WITH OTHER MEDS; Therapy: 12IKP3075 to (Evaluate:17Oct2017)  Requested for: 21Mar2017; Last   Rx:21Mar2017 Ordered    2  Aspirin 81 MG TABS; TAKE 1 TABLET DAILY; Therapy: 32VXV6450 to Recorded   3  Isosorbide Mononitrate ER 60 MG Oral Tablet Extended Release 24 Hour; TAKE 1   TABLET ONCE DAILY; Therapy: 09AXK7824 to Recorded   4  MetFORMIN HCl - 1000 MG Oral Tablet; TAKE 1 TABLET TWICE DAILY; Therapy: 55UCD6053 to (Daryl Escalera) Recorded   5  Metoprolol Tartrate 25 MG Oral Tablet; TAKES HALF A TABLET TWICE A DAY; Therapy: 29COR2464 to (Daryl Escalera) Recorded    6  Lisinopril 20 MG Oral Tablet; TAKE 1 TABLET TWICE DAILY; Therapy: (Recorded:02Jun2017) to Recorded    7  Atorvastatin Calcium 40 MG Oral Tablet; Therapy: (Recorded:31May2017) to Recorded    8  Accu-Chek Barbara Plus In Vitro Strip; TEST TWICE DAILY; Therapy: 07Apr2017 to (Evaluate:79Qud7300);  Last Rx:07Apr2017 Ordered   9  Accu-Chek Barbara Plus w/Device Kit; USE AS DIRECTED; Therapy: 22ALK8237 to (Last Rx:07Apr2017) Ordered    10  Gabapentin 100 MG Oral Capsule (Neurontin); Take 1tab qhs, x 1 week then 1tab bid x 1    week then 1tab tid; Therapy: 40JEY0452 to (Last Rx:08Jun2017)  Requested for: 56LVU6033 Ordered    11  Pantoprazole Sodium 40 MG Oral Tablet Delayed Release (Protonix); take 1 tablet twice    a day; Therapy: 36ESB8046 to ([facility]:94PHF4110)  Requested for: 01Apr2017; Last    Rx:01Apr2017 Ordered    12  CoQ10 CAPS; Therapy: (Recorded:18Jan2016) to Recorded   13  Vitamin B12 TABS; Therapy: (Recorded:18Jan2016) to Recorded    14  ALPRAZolam 1 MG Oral Tablet; 1/2 TAB Q 6-8 HOURS FOR ANXIETY- NO DRIVING WITH    THE MEDICATION;  NO ALCOHOL; Therapy: 68BVY8765 to (Evaluate:91Nlr5660)  Requested for: 26TZW8833; Last    Rx:46Mic7894 Ordered    15  Citalopram Hydrobromide 20 MG Oral Tablet; 1 TAB MID DAY-DOSAGE CHANGE;    Therapy: 17VCL2715 to (Evaluate:15Oct2017)  Requested for: 18Apr2017; Last    Rx:18Apr2017 Ordered    16  Clopidogrel Bisulfate 75 MG Oral Tablet; TAKE 1 TABLET DAILY;     Therapy: (Recorded:06Nov2014) to Recorded    Future Appointments    Date/Time Provider Specialty Site   07/12/2017 08:30 AM Joey Lan, DO Internal Medicine [address]   08/10/2017 03:00 PM [facility]     Patient Care Team    Care Team Member Role Specialty Office Number   Ish Elizalde MD Specialist Gastroenterology Adult [phone]   Madiha Vale MD   [phone]     Signatures   Electronically signed by : Ela Klein; Jul 5 2017  3:26PM EST                       (Author)

## 2018-01-15 NOTE — MISCELLANEOUS
Assessment    1  Aneurysm of infrarenal abdominal aorta (441 4) (I71 4)   2  Anemia (285 9) (D64 9)   3  Benign essential hypertension (401 1) (I10)   4  Diabetes type 2, uncontrolled (250 02) (E11 65)   5  Arteriosclerotic cardiovascular disease (429 2,440 9) (I25 10)   6  History of Transition of care   7  Transition of care    Plan  Diabetes type 2, uncontrolled    · (1) CBC/PLT/DIFF; Status:Active; Requested for:53Qns2697;    Perform:[facility]; Due:33Awi4814; Ordered; For:Diabetes type 2, uncontrolled; Ordered By:Robert Velez;   · (1) COMPREHENSIVE METABOLIC PANEL; Status:Active; Requested for:91Grs4439;    Perform:[facility]; Due:29Enj1899; Ordered; For:Diabetes type 2, uncontrolled; Ordered By:Tiffanie Velez;   · (1) HEMOGLOBIN A1C; Status:Active; Requested for:12Apu5452;    Perform:[facility]; Due:37Jla0895; Ordered; For:Diabetes type 2, uncontrolled; Ordered By:Robert Velez;   · (1) LIPID PANEL, FASTING; Status:Active; Requested for:40Via4342;    Perform:[facility]; Due:30Flt1197; Ordered; For:Diabetes type 2, uncontrolled; Ordered By:Tiffanie Velez;   · (1) MICROALBUMIN CREATININE RATIO, RANDOM URINE; Status:Active; Requested  for:25Iiu5551;    Perform:[facility]; Due:22Fpo5120; Ordered; For:Diabetes type 2, uncontrolled; Ordered By:Robert Velez;   · (1) TSH; Status:Active; Requested for:35Dmg7022;    Perform:[facility]; Due:54Gve9846; Ordered; For:Diabetes type 2, uncontrolled; Ordered By:Robert Velez;   · (1) VALPROIC ACID (DEPAKOTE); Status:Active; Requested for:87Oxu9168;    Perform:[facility]; GA84XIO4521; Ordered;   For:Diabetes type 2, uncontrolled; Ordered By:Robert Velez;  Need for influenza vaccination    · Fluzone High-Dose 0 5 ML Intramuscular Suspension Prefilled Syringe   For: Need for influenza vaccination; Ordered By:Robert Velez; Effective Date:06Oct2017; Administered by: Laly Parker: 10/6/2017 11:30:00 PM  PMH: Anxiety, Diabetes type 2, uncontrolled    · ALPRAZolam 0 5 MG Oral Tablet; 1/2 TAB IN AM AND 1 TAB AT NIGHT - CAN USE  1/2 TAB MID DAY FOR PRN ANXIETY; Do Not Fill Before: 84DAX1147   Rx By: Jaxon Rodarte; Dispense: 30 Days ; #:60 Tablet; Refill: 1; For: PMH: Anxiety, Diabetes type 2, uncontrolled; PANCHITO = N; Print Rx    Discussion/Summary  Discussion Summary:   FLU SHOT TODAY  CONTINUE ON MEDS  LIST UPDATED AND REVIEWED IN DETAIL WITH DAUGHTER  REFER TO [address] [facility]- FOR DEPRESSION; JANNETTE GERIATRICIAN  REX Wang Mendoza GONZALEZ  S/P CATH WITH RCA STENT- ON BRILLENTA- FOR A MONTH- TRANSITION TO ASA 81 AND PLAVIX IN 30 DAYS  FOLLJORDAN FUNKP WITH CARDIOLOGY  DM2- STABLE- ON METOFRMIN 1000 MG DAILY  LAB SIN 20-3 MONTHS  CALL WITH ANY ISSUES;  Counseling Documentation With Imm: The patient, patient's family was counseled regarding diagnostic results, instructions for management, risk factor reductions, prognosis, patient and family education, impressions, risks and benefits of treatment options, importance of compliance with treatment  Chief Complaint  Chief Complaint Free Text Note Form: pPATIENT HERE WIHT HIS DAUGHTER AFTER HOSPITALIZATION FOR CATH; HE HAD AN EPISODE OF CONFUSION; HE IS DEPRESSED AND GRIEIVING THE LOSS OF HIS WIFE AND GRANDSON- HE WOULD LIKE TO ADVANCE HIS EXERCISE; HE IS ON BRILENTA  HE WOULD LIKE TO SEEK COUNSELING; History of Present Illness  TCM Communication [facility]: The patient is being contacted for follow-up after hospitalization and FOLLOW UP AFTER CATH  Hospital course was discussed with the inpatient physician and records were reviewed  He was hospitalized at [facility]  The date of admission: 09/26/2017, date of discharge: 09/29/2017   Diagnosis: ACUTE ENCEPHALOPATHY, CAD, PARKINSON DISEASE, CORONARY ATHEROSCLEROSIS OF UNSPECIFIED TYPE FO VESSEL, NATIVE OR GRAFT, BENIGN ESSENTIAL HTN, HYPERLIPIDEMIA  He was discharged to home  Medications were not reviewed today  He scheduled a follow up appointment  Counseling was provided to the patient  Topics counseled included importance of compliance with treatment  Communication performed and completed by Cee Oconnor   Diabetes Type II (Follow-Up): The patient states he has been stable with his Type II Diabetes control since the last visit ON METFORMIN 1000 MG DAILY  Comorbid Illnesses: hypertension and hyperlipidemia  Complications: no peripheral neuropathy, no gastroparesis, no nephropathy and no coronary artery disease  He has no known diabetic complications  Symptoms: denies numbness of the feet, denies a foot ulcer, denies foot pain and denies vomiting  Associated symptoms include no polyuria  Home monitoring: Glycemic control has been good  the patient reports no symptomatic hypoglycemic episodes  Medications: the patient is adherent with his medication regimen  Hyperlipidemia (Follow-Up): The patient states his hyperlipidemia has been stable since the last visit  Comorbid Illnesses: hypertension  He has no significant interval events  Symptoms: The patient is currently asymptomatic  denies chest pain, denies intermittent leg claudication, denies muscle pain and denies muscle weakness  Associated symptoms include no focal neurologic deficits and no memory loss  Medications: the patient is adherent with his medication regimen  He denies medication side effects  Hypertension (Follow-Up): The patient presents for follow-up of essential hypertension  The patient states he has been doing well with his blood pressure control since the last visit  He has no significant interval events  Symptoms: The patient is currently asymptomatic  Coronary Artery Disease (CAD): The patient is being seen for RECENT CATH WITH STENT RCA- IMPROVED SYOTMS- ON BRILLENTA coronary artery disease   No changes in management were made at the last visit  Symptoms:  no chest pain, no chest pressure, no jaw pain, no orthopnea and no paroxysmal nocturnal dyspnea  The patient is currently asymptomatic  The patient is currently experiencing symptoms  Depression (Follow-Up): The patient states his depression has DEPRESSED MOOD- GRIEVING LOSS OF WIFE AND GRANDSON, but been stable since the last visit  He is also being followed by a psychologist  He has had no significant interval events  Interval Symptoms:   HPI: PATIENT WITH ET - ON DEPAKOTE- HE HAD EPISODE OF CONFUSION PRIOR TO CATH- ? POSSIBLY FROM MEDS - HAD WORK UP- NEGATIVE- DEPAKOTE IS WORKING WELL  HE HAS HX OF DIABETES, HE IS DEPRESSED;      Review of Systems  Complete-Male:   Constitutional: No fever or chills, feels well, no tiredness, no recent weight gain or weight loss, as noted in HPI, not feeling poorly and not feeling tired  Eyes: eyesight problems and WEARS GLASSES, but No complaints of eye pain, no red eyes, no discharge from eyes, no itchy eyes  ENT: HEARING AIDS B/L, but no complaints of earache, no hearing loss, no nosebleeds, no nasal discharge, no sore throat, no hoarseness, no earache, no nosebleeds, no hearing loss and no nasal discharge  Cardiovascular: No complaints of slow heart rate, no fast heart rate, no chest pain, no palpitations, no leg claudication, no lower extremity, as noted in HPI, no chest pain, no intermittent leg claudication, no palpitations and no extremity edema  Respiratory: as noted in HPI, no shortness of breath, no cough, no wheezing and no shortness of breath during exertion  Gastrointestinal: No complaints of abdominal pain, no constipation, no nausea or vomiting, no diarrhea or bloody stools, as noted in HPI, no nausea and no diarrhea    The patient presents with complaints of sudden onset of intermittent episodes of moderate epigastric no abdominal pain, described as sharp, radiating to the epigastric area   On a scale of 1 to 10, the patient rates the pain as 7  Symptoms are not improved by antiemetics, antacids, bismuth subsalicylate, h2 blockers and PPIs  Symptoms are not made worse by eating, drinking, fatty foods, dairy foods and alcohol  Symptoms are unchanged  Genitourinary: No complaints of dysuria, no incontinence, no hesitancy, no nocturia, no genital lesion, no testicular pain, no urinary hesitancy and no nocturia  Musculoskeletal: SPASM LEFT TRAPEZIUS; LIMITATION OF ROTATION TO RIGHT, but No complaints of arthralgia, no myalgias, no joint swelling or stiffness, no limb pain or swelling, as noted in HPI, no arthralgias, no joint swelling and no joint stiffness  Integumentary: No complaints of skin rash or skin lesions, no itching, no skin wound, no dry skin, no rashes, no itching, no skin lesions and no skin wound  Neurological: No compliants of headache, no confusion, no convulsions, no numbness or tingling, no dizziness or fainting, no limb weakness, no difficulty walking, no headache, no numbness, no confusion, no dizziness and no convulsions  Psychiatric: Is not suicidal, no sleep disturbances, no anxiety or depression, no change in personality, no emotional problems, no anxiety and no depression  Endocrine: No complaints of proptosis, no hot flashes, no muscle weakness, no erectile dysfunction, no deepening of the voice, no feelings of weakness, no proptosis, no muscle weakness and no hot flashes  Hematologic/Lymphatic: No complaints of swollen glands, no swollen glands in the neck, does not bleed easily, no easy bruising, no swollen glands and no swollen glands in the neck  ROS Reviewed:   ROS reviewed  Active Problems    1  Abdominal pain, acute, epigastric (789 06,338 19) (R10 13)   2  Acquired polyneuropathy (356 9) (G62 9)   3  Anemia (285 9) (D64 9)   4  Aneurysm of infrarenal abdominal aorta (441 4) (I71 4)   5  Arteriosclerotic cardiovascular disease (429 2,440 9) (I25 10)   6  Benign essential hypertension (401 1) (I10)   7  Bilateral hearing loss, unspecified hearing loss type (389 9) (H91 93)   8  Bilateral impacted cerumen (380 4) (H61 23)   9  Cervical disc disorder with radiculopathy of mid-cervical region (723 4) (M50 120)   10  Cervical radicular pain (723 4) (M54 12)   11  Diabetes type 2, uncontrolled (250 02) (E11 65)   12  Dizziness (780 4) (R42)   13  Essential tremor (333 1) (G25 0)   14  Frequent falls (V15 88) (R29 6)   15  Gait disturbance (781 2) (R26 9)   16  GERD (gastroesophageal reflux disease) (530 81) (K21 9)   17  Hyperlipidemia (272 4) (E78 5)   18  Murmur (785 2) (R01 1)   19  Myofascial muscle pain (729 1) (M79 1)   20  Onychomycosis (110 1) (B35 1)   21  Peripheral vascular disease (443 9) (I73 9)   22  Pharyngoesophageal dysphagia (787 24) (R13 14)   23  Sensorineural hearing loss, bilateral (389 18) (H90 3)   24  Sleep disturbances (780 50) (G47 9)   25  Thoracic ascending aortic aneurysm (441 2) (I71 2)   26  Trapezius muscle spasm (728 85) (T71 480)    Past Medical History    1  History of Angiographic Coronary Findings   2  History of shortness of breath (V13 89) (Z87 898)   3  History of Medicare annual wellness visit, subsequent (V70 0) (Z00 00)   4  History of Thoracic ascending aortic aneurysm (441 2) (I71 2)   5  History of Transition of care    Surgical History    1  History of Hernia Repair   2  History of Surgery For Popliteal Artery Aneurysm  Surgical History Reviewed: The surgical history was reviewed and updated today  Family History  Family History Reviewed: The family history was reviewed and updated today  Social History    · Former smoker (C12 97) (V43 112)   · Stopped Drinking Alcohol  Social History Reviewed: The social history was reviewed and updated today  The social history was reviewed and is unchanged  Current Meds   1  Accu-Chek Barbara Plus In Vitro Strip; TEST TWICE DAILY;    Therapy: 07Apr2017 to (Evaluate:00Vvb2659); Last Rx:07Apr2017 Ordered   2  Accu-Chek Barbara Plus w/Device Kit; USE AS DIRECTED; Therapy: 49WJT9482 to (Last Rx:07Apr2017) Ordered   3  ALPRAZolam 0 5 MG Oral Tablet; 1/2 TAB IN AM AND 1 TAB AT NIGHT - CAN USE 1/2 TAB   MID DAY FOR PRN ANXIETY; Therapy: 27JUC0942 to (Teagan Alfaro)  Requested for: 07Sep2017; Last   Rx:07Sep2017 Ordered   4  Aspirin 81 MG TABS; TAKE 1 TABLET DAILY; Therapy: 76JVS8812 to Recorded   5  Atorvastatin Calcium 40 MG Oral Tablet; take 1 tablet by mouth every evening  Requested   for: 44Ytx2517; Last Rx:40Lsq9375 Ordered   6  Brilinta TABS; Therapy: (Recorded:06Oct2017) to Recorded   7  Citalopram Hydrobromide 20 MG Oral Tablet; 1 TAB MID DAY-DOSAGE CHANGE;   Therapy: 42QUL5930 to (Evaluate:15Oct2017)  Requested for: 18Apr2017; Last   Rx:18Apr2017 Ordered   8  CoQ10 CAPS; Therapy: (Recorded:18Jan2016) to Recorded   9  Depakote 250 MG Oral Tablet Delayed Release; Therapy: (Recorded:06Oct2017) to Recorded   10  Isosorbide Mononitrate ER 60 MG Oral Tablet Extended Release 24 Hour; TAKE 1    TABLET ONCE DAILY; Therapy: 13POT2968 to Recorded   11  Lisinopril 20 MG Oral Tablet; TAKE 1 TABLET TWICE DAILY  Requested for: 36Jcu0686;    Last Rx:24Qtr4784 Ordered   12  MetFORMIN HCl - 1000 MG Oral Tablet; TAKE 1 TABLET TWICE DAILY; Therapy: 52KOD8556 to (Kunal Reynolds) Recorded   13  Methocarbamol 500 MG Oral Tablet; TAKE 0 5 to 1 TABLET Bedtime PRN muscle spasm; Therapy: 06GEN7725 to (Evaluate:87Vfu2702)  Requested for: 75HIE6072; Last    Rx:56Lwl4232 Ordered   14  Metoprolol Tartrate 25 MG Oral Tablet; TAKES HALF A TABLET TWICE A DAY; Therapy: 61ILV2844 to (Kunal Reynolds) Recorded   15  Pantoprazole Sodium 40 MG Oral Tablet Delayed Release; take 1 tablet by mouth once    daily; Therapy: 95XEX5898 to (Telly Calero)  Requested for: 75Chb4807 Recorded   16  Vitamin B12 TABS;     Therapy: (Recorded:18Jan2016) to Recorded  Medication List Reviewed: The medication list was reviewed and updated today  Allergies    1  No Known Drug Allergies    Vitals  Signs   Recorded: 56MCQ9261 10:46AM   Temperature: 98 4 F  Heart Rate: 76  Respiration: 18  Systolic: 568  Diastolic: 78  Height: 5 ft 7 in  Weight: 193 lb 6 4 oz  BMI Calculated: 30 29  BSA Calculated: 1 99    Physical Exam    Constitutional   General appearance: No acute distress, well appearing and well nourished  WDWN MALE IN NAD  Eyes   Conjunctiva and lids: No swelling, erythema, or discharge  Pupils and irises: Equal, round and reactive to light  Ears, Nose, Mouth, and Throat   External inspection of ears and nose: Normal     Otoscopic examination: Tympanic membrance translucent with normal light reflex  Canals patent without erythema  DECREAED HEARING B/L WITH SCREENING TEST DONE IN OFFICE  Nasal mucosa, septum, and turbinates: Normal without edema or erythema  The nasal cavity was examined using a speculum  no nasal discharge, normal nasal mucosa, normal nasal septum, no intranasal masses or polyps and normal nasal turbinates  Oropharynx: Normal with no erythema, edema, exudate or lesions  Pulmonary   Respiratory effort: No increased work of breathing or signs of respiratory distress  Auscultation of lungs: Abnormal   Auscultation of the lungs revealed no expiratory wheezing, normal expiratory time and no inspiratory wheezing  no rales or crackles were heard bilaterally  no rhonchi  no friction rub  no wheezing  diminished breath sounds over both bases  no bronchial breath sounds  Cardiovascular   Palpation of heart: Normal PMI, no thrills  Auscultation of heart: Normal rate and rhythm, normal S1 and S2, without murmurs  NO S3 NOTED  Examination of extremities for edema and/or varicosities: Normal     Carotid pulses: Normal     Abdomen   Abdomen: Non-tender, no masses  Liver and spleen: No hepatomegaly or splenomegaly  NO CVA TENDERNESS     Lymphatic Palpation of lymph nodes in neck: No lymphadenopathy  Musculoskeletal   Gait and station: Normal     Digits and nails: Normal without clubbing or cyanosis  Inspection/palpation of joints, bones, and muscles: Abnormal   IMPROVED SPASM LEFT TRAPEZIUS MUSCLE  Skin   Skin and subcutaneous tissue: Normal without rashes or lesions  Neurologic   Cranial nerves: Cranial nerves 2-12 intact  Reflexes: 2+ and symmetric  Sensation: No sensory loss  Psychiatric   Orientation to person, place and time: Normal     Mood and affect: Normal     Diabetic Foot Screen: Normal        Future Appointments    Date/Time Provider Specialty Site   10/18/2017 10:00 AM Colleen Hodge DO Internal Medicine [address] [facility]   02/19/2018 05:30 PM Santo Norton MD Neurology [address] [facility]     Signatures   Electronically signed by :  Jayla Thornton DO; Oct  6 2017 12:47PM EST                       (Author)

## 2018-01-16 NOTE — MISCELLANEOUS
History of Present Illness  TCM Communication [facility]: The patient is being contacted for follow-up after hospitalization  Hospital course was discussed with the inpatient physician  He was hospitalized at [facility]  The date of admission: 11/02/2017, date of discharge: 11/03/2017  Diagnosis: CHEST PAIN, ELEVATED LIPASE, ESSENTIAL HTN- BENIGN, DM 2, HYPERLIPIDEMIA, CORONARY ATHEROSCLEROSIS  He was discharged to home  Medications were not reviewed today  He did not schedule a follow up appointment  He refused a follow up appointment due to PREFERS TO F/U WITH CARDIOLOGY AND  SENIOR CARE   Counseling was provided to patient's family  DAUGHTER CHRISTIANO  Topics counseled included importance of compliance with treatment  Communication performed and completed by Taras Clay      Active Problems   1  Abdominal pain, acute, epigastric (789 06,338 19) (R10 13)  2  Acquired polyneuropathy (356 9) (G62 9)  3  Anemia (285 9) (D64 9)  4  Aneurysm of infrarenal abdominal aorta (441 4) (I71 4)  5  Anxiety (300 00) (F41 9)  6  Arteriosclerotic cardiovascular disease (429 2,440 9) (I25 10)  7  Benign essential hypertension (401 1) (I10)  8  Bilateral hearing loss, unspecified hearing loss type (389 9) (H91 93)  9  Bilateral impacted cerumen (380 4) (H61 23)  10  Cervical disc disorder with radiculopathy of mid-cervical region (723 4) (M50 120)  11  Cervical radicular pain (723 4) (M54 12)  12  Confusion (298 9) (R41 0)  13  Diabetes type 2, uncontrolled (250 02) (E11 65)  14  Dizziness (780 4) (R42)  15  Essential tremor (333 1) (G25 0)  16  Frequent falls (V15 88) (R29 6)  17  Gait disturbance (781 2) (R26 9)  18  GERD (gastroesophageal reflux disease) (530 81) (K21 9)  19  Hyperlipidemia (272 4) (E78 5)  20  Murmur (785 2) (R01 1)  21  Myofascial muscle pain (729 1) (M79 1)  22  Need for influenza vaccination (V04 81) (Z23)  23  Onychomycosis (110 1) (B35 1)  24  Peripheral vascular disease (443 9) (I73 9)  25  Pharyngoesophageal dysphagia (787 24) (R13 14)  26  Sensorineural hearing loss, bilateral (389 18) (H90 3)  27  Sleep disturbances (780 50) (G47 9)  28  Thoracic ascending aortic aneurysm (441 2) (I71 2)  29  Transition of care  30  Trapezius muscle spasm (728 85) (V36 941)    Past Medical History   1  History of Angiographic Coronary Findings  2  History of shortness of breath (V13 89) (Z87 898)  3  History of Medicare annual wellness visit, subsequent (V70 0) (Z00 00)  4  History of Thoracic ascending aortic aneurysm (441 2) (I71 2)  5  History of Transition of care    Surgical History   1  History of Hernia Repair  2  History of Surgery For Popliteal Artery Aneurysm    Social History    · Former smoker (G13 60) (L56 890)   · Stopped Drinking Alcohol    Current Meds  1  Accu-Chek Barbara Plus In Vitro Strip; TEST TWICE DAILY; Therapy: 07Apr2017 to (Evaluate:14Nmb2980); Last Rx:07Apr2017 Ordered  2  Accu-Chek Barbara Plus w/Device Kit; USE AS DIRECTED; Therapy: 18JHU6366 to (Last Rx:07Apr2017) Ordered  3  ALPRAZolam 0 5 MG Oral Tablet; 1/2 TAB IN AM AND 1 TAB AT NIGHT - CAN USE 1/2 TAB   MID DAY FOR PRN ANXIETY; Therapy: 39FXG4680 to (Evaluate:27Lbz4175)  Requested for: 73BLT6581; Last   Rx:44Vmb2568 Ordered  4  ALPRAZolam 0 5 MG Oral Tablet; TAKE 2 TABS DAILY: 1/2 TAB IN AM, 1/2 TAB IN   AFTERNOON AND 1 TAB AT NIGHT FOR 30 DAYS ONLY; Last Rx:85Qlc0115 Ordered  5  Aspirin 81 MG TABS; TAKE 1 TABLET DAILY; Therapy: 67QTD6960 to Recorded  6  Atorvastatin Calcium 40 MG Oral Tablet; take 1 tablet by mouth every evening  Requested   for: 56Fgz4097; Last Rx:49Sfs0407 Ordered  7  Brilinta TABS; Therapy: (Recorded:06Oct2017) to Recorded  8  Citalopram Hydrobromide 20 MG Oral Tablet; 1 TAB MID DAY-DOSAGE CHANGE;   Therapy: 99SDU2968 to (Evaluate:25Eql7489)  Requested for: 09YNL9388; Last   Rx:30Oct2017 Ordered  9  CoQ10 CAPS; Therapy: (Recorded:18Jan2016) to Recorded  10   Divalproex Sodium 250 MG Oral Tablet Delayed Release; TAKE ONE TABLET AT DINNER     Requested for: 37ENN6769; Last Rx:26Oct2017 Ordered  11  Famotidine 40 MG Oral Tablet; 1 TAB AT SUPPER FOR ABDOMINAL PAIN- DO NOT TAKE    WITH OTHER MEDS; Therapy: 45OLE1934 to (Dion Dickey)  Requested for: 02DPD7271; Last    Rx:10Oct2017 Ordered  12  Isosorbide Mononitrate ER 60 MG Oral Tablet Extended Release 24 Hour; TAKE 1    TABLET ONCE DAILY; Therapy: 54BVJ9625 to Recorded  13  Lisinopril 20 MG Oral Tablet; TAKE 1 TABLET TWICE DAILY  Requested for: 10Jsm3145;    Last Rx:62Mtt0965 Ordered  14  MetFORMIN HCl - 1000 MG Oral Tablet; TAKE 1 TABLET TWICE DAILY; Therapy: 16USX7498 to (Raymond Cole) Recorded  15  Methocarbamol 500 MG Oral Tablet; TAKE 0 5 to 1 TABLET Bedtime PRN muscle spasm; Therapy: 07IVM2195 to (Evaluate:15Hzh6344)  Requested for: 60YXG1095; Last    Rx:79Lvg6581 Ordered  16  Metoprolol Tartrate 25 MG Oral Tablet; TAKES HALF A TABLET TWICE A DAY; Therapy: 64CMT7101 to (Evaluate:07Swe4699) Recorded  17  Pantoprazole Sodium 40 MG Oral Tablet Delayed Release; take 1 tablet by mouth once    daily; Therapy: 57HAF4680 to (26 988928)  Requested for: 63Aik1854 Recorded  18  Vitamin B12 TABS; Therapy: (Recorded:18Jan2016) to Recorded    Allergies   1  No Known Drug Allergies    Health Management  History of Diabetes mellitus   (1) HEMOGLOBIN A1C; every 3 months; Last 01Aug2017; Next Due: 59SFL6363; Overdue  (1) MICROALBUMIN, 24HR URINE; every 1 year; Next Due: 26JLW7627; Overdue  *VB - Foot Exam; every 1 year; Last 01Aug2017; Next Due: 63Ddm2789; Active  Health Maintenance   COLONOSCOPY; every 10 years; Last 11PYK2888; Next Due: 71YBR7937;  Active    Message   Recorded as Task   Date: 11/06/2017 08:56 AM, Created By: Ana Drummond   Task Name: Care Coordination   Assigned To: Ana Drummond   Regarding Patient: Sarah Ann, Status: Active   Comment:    Rena Rausch - 06 Nov 2017 8:56 AM     TASK CREATED  D/C [place] 11/03   Rena Rausch - 06 Nov 2017 10:25 AM     TASK REASSIGNED: Previously Assigned To [facility]     Future Appointments    Date/Time Provider Specialty Site   12/04/2017 10:30 AM Marguerite Velez DO Internal Medicine [address] [facility] [facility]   02/19/2018 05:30 PM Ramiro Lucio MD Neurology [address]     Signatures   Electronically signed by : Jayla Thornton DO; Nov 13 2017  1:01PM EST                       (Author)    Electronically signed by :  Jayla Thornton DO; Nov 13 2017  1:01PM EST                       (Author)

## 2018-01-16 NOTE — MISCELLANEOUS
Assessment   1  Transition of care  2  Arteriosclerotic cardiovascular disease (429 2,440 9) (I25 10)  3  Aneurysm of infrarenal abdominal aorta (441 4) (I71 4)  4  Benign essential hypertension (401 1) (I10)  5  Thoracic ascending aortic aneurysm (441 2) (I71 2)  6  Anxiety (300 00) (F41 9)    Plan  Anxiety    · Renew: ALPRAZolam 0 5 MG Oral Tablet; 1/2-1 TAB PO Q 8 HOURS PRN ANXIETY  Rx By: Alexsander Weston; Dispense: 30 Days ; #:90 Tablet; Refill: 1;For: Anxiety;   PANCHITO = N; Print Rx   · Renew: FLUoxetine HCl - 20 MG Oral Tablet; 1 Tab daily  Rx By: Alexsander Weston; Dispense: 30 Days ; #:30 Tablet; Refill: 5;For: Anxiety;   PANCHITO = N; Verified Transmission to [id number] Msg to Pharmacy: DOSAGE   CHANGE; Last Updated By: System, [employer]; 3/25/2016 8:42:36 AM    Discussion/Summary  Discussion Summary:   INCREASE FLUOXITINE TO 20 MG  REFILL ALPRAZOLAM  CALL IF NO BETTER  FOLLOW UPWITH CT OF CHEST 1 YEAR  FOLLOW UP RENAL CYST WITH CT  AAA- STABLE  Counseling Documentation With Imm: The patient was counseled regarding diagnostic results, instructions for management, risk factor reductions, prognosis, patient and family education, impressions, risks and benefits of treatment options, importance of compliance with treatment  Chief Complaint  Chief Complaint Free Text Note Form: PATIENT HERE FOR JOAQUIM VISIT; HE HAD AN ISSUE WITH HIS DAUGHTER AND HAD CP - HE WENT TO ER - HE HAD DOBUT STRESS ECHO THAT SHOWED NO ISCHEMIA; HE HAD CT OF AB THAT SHOWED CYST ON HIS LEFT KIDNEY AND ASCENDING THORACIC AORTA AT 4 BY 3 9 CM- REPEAT ONE YEAR; HE STILL HAS SOME ANXIETY WITH THE ONGOING SITUATION WITH HIS DTR; History of Present Illness  TCM Communication [facility]: The patient is being contacted for follow-up after hospitalization and ADMITEED FOR [facility]  Hospital course was discussed with the inpatient physician and records were reviewed  He was hospitalized at [facility]   The date of admission: 03/19/16, date of discharge: 03/20/16  Diagnosis: CHEST PAIN  He was discharged to home  Medications reviewed and updated today  He scheduled a follow up appointment  Counseling was provided to the patient  Topics counseled included importance of compliance with treatment  Communication performed and completed by Natalie Flower   HPI: PATIENT HAS HAD OCCASIONAL CP; HIS DAUGHTER HAS CAUSED ANXIETY      Review of Systems  Complete-Male:   Constitutional: feeling poorly and feeling tired, but No fever or chills, feels well, no tiredness, no recent weight gain or weight loss and as noted in HPI  Eyes: eyesight problems and WEARS GLASSES, but No complaints of eye pain, no red eyes, no discharge from eyes, no itchy eyes  ENT: HEARING AIDS B/L, but no complaints of earache, no hearing loss, no nosebleeds, no nasal discharge, no sore throat, no hoarseness, no earache, no nosebleeds, no hearing loss and no nasal discharge  Cardiovascular: No complaints of slow heart rate, no fast heart rate, no chest pain, no palpitations, no leg claudication, no lower extremity, as noted in HPI, no chest pain, no intermittent leg claudication, no palpitations and no extremity edema  Respiratory: OCCASIONAL CHEST PAIN WIHT STRESS;, but as noted in HPI, no shortness of breath, no cough, no wheezing and no shortness of breath during exertion  Gastrointestinal: as noted in HPI, no abdominal pain, no nausea and no diarrhea  Genitourinary: No complaints of dysuria, no incontinence, no hesitancy, no nocturia, no genital lesion, no testicular pain, no urinary hesitancy and no nocturia  Musculoskeletal: No complaints of arthralgia, no myalgias, no joint swelling or stiffness, no limb pain or swelling, as noted in HPI, no arthralgias, no joint swelling and no joint stiffness  Integumentary: No complaints of skin rash or skin lesions, no itching, no skin wound, no dry skin     Neurological: No compliants of headache, no confusion, no convulsions, no numbness or tingling, no dizziness or fainting, no limb weakness, no difficulty walking  Psychiatric: Is not suicidal, no sleep disturbances, no anxiety or depression, no change in personality, no emotional problems  Endocrine: No complaints of proptosis, no hot flashes, no muscle weakness, no erectile dysfunction, no deepening of the voice, no feelings of weakness, no proptosis and no hot flashes  Hematologic/Lymphatic: No complaints of swollen glands, no swollen glands in the neck, does not bleed easily, no easy bruising, no swollen glands and no swollen glands in the neck  ROS Reviewed:   ROS reviewed  Active Problems   1  Abnormal blood chemistry (790 6) (R79 9)  2  Anemia (285 9) (D64 9)  3  Aneurysm of infrarenal abdominal aorta (441 4) (I71 4)  4  Anxiety (300 00) (F41 9)  5  Arteriosclerotic cardiovascular disease (429 2,440 9) (I25 10)  6  Benign essential hypertension (401 1) (I10)  7  Cervical paraspinous muscle spasm (728 85) (M62 838)  8  Cough (786 2) (R05)  9  Decreased hearing, bilateral (389 9) (H91 93)  10  Diabetes mellitus (250 00) (E11 9)  11  Diabetes type 2, uncontrolled (250 02) (E11 65)  12  Dyspnea (786 09) (R06 00)  13  Encounter for prostate cancer screening (V76 44) (Z12 5)  14  Encounter for screening colonoscopy (V76 51) (Z12 11)  15  GERD (gastroesophageal reflux disease) (530 81) (K21 9)  16  Hyperlipidemia (272 4) (E78 5)  17  Medicare annual wellness visit, subsequent (V70 0) (Z00 00)  18  Mental status change (780 97) (R41 82)  19  Murmur (785 2) (R01 1)  20  Need for prophylactic vaccination against Streptococcus pneumoniae (pneumococcus)    (V03 82) (Z23)  21  Need for prophylactic vaccination and inoculation against influenza (V04 81) (Z23)  22  Onychomycosis (110 1) (B35 1)  23  Peripheral vascular disease (443 9) (I73 9)  24  Shortness of breath (786 05) (R06 02)  25  Sleep disturbances (780 50) (G47 9)    Past Medical History   1   History of Angiographic Coronary Findings  2  History of Thoracic ascending aortic aneurysm (441 2) (I71 2)    Surgical History   1  History of Hernia Repair  2  History of Surgery For Popliteal Artery Aneurysm    Family History  Family History Reviewed: The family history was reviewed and updated today  Social History    · Former smoker (Z77 38) (N64 063)   · Stopped Drinking Alcohol  Social History Reviewed: The social history was reviewed and updated today  The social history was reviewed and is unchanged  Current Meds  1  ALPRAZolam 0 5 MG Oral Tablet; 1/2-1 TAB PO Q 8 HOURS PRN ANXIETY; Therapy: 55WLN4134 to (Evaluate:10Jan2016); Last Rx:11Nov2015 Ordered  2  Amoxicillin 875 MG Oral Tablet; 1 tab PO BID; Therapy: 72WCT0148 to (Evaluate:15Feb2016)  Requested for: 24SVI6763; Last   Rx:56Gpk9618 Ordered  3  Aspirin 81 MG Oral Tablet; TAKE 1 TABLET DAILY; Therapy: 50WDC8144 to Recorded  4  Atorvastatin Calcium 10 MG Oral Tablet; TAKE 1 TABLET DAILY; Therapy: (Recorded:09Oct2015) to Recorded  5  Clopidogrel Bisulfate 75 MG Oral Tablet; TAKE 1 TABLET DAILY; Therapy: (Recorded:06Nov2014) to Recorded  6  CoQ10 CAPS; Therapy: (Recorded:18Jan2016) to Recorded  7  Cyclobenzaprine HCl - 10 MG Oral Tablet; 1/2 TO 1 TAB QHS FOR 2 WEEKS FOR   SPASM; Therapy: 62FQY1603 to (Evaluate:29Jan2016)  Requested for: 41FWP0574; Last   Rx:19Jan2016 Ordered  8  Docusate Sodium 100 MG Oral Capsule; TAKE 1 CAPSULE DAILY; Therapy: (Recorded:06Nov2014) to Recorded  9  Famotidine 40 MG Oral Tablet; Therapy: (Recorded:18Jan2016) to Recorded  10  FLUoxetine HCl - 10 MG Oral Tablet; TAKE 1 TABLET DAILY AS DIRECTED; Therapy: 78EOE3485 to (Evaluate:31Mar2016)  Requested for: 95Llt2983; Last    Rx:57Hsd0942 Ordered  11  Gemfibrozil 600 MG Oral Tablet; TAKE 1 TABLET TWICE DAILY; Therapy: 51GTU5429 to Recorded  12  Iron TABS; TAKE 1 TABLET ONCE DAILY; Therapy: 75WLA7286 to Recorded  13   Isosorbide Mononitrate ER 60 MG Oral Tablet Extended Release 24 Hour; TAKE 1    TABLET ONCE DAILY; Therapy: 83NMJ0794 to Recorded  14  Lisinopril 10 MG Oral Tablet; Therapy: (Recorded:18Jan2016) to Recorded  15  MetFORMIN HCl - 1000 MG Oral Tablet; TAKE 1 TABLET TWICE DAILY; Therapy: 79WAD2428 to (iRck Diehl) Recorded  16  Metoprolol Tartrate 25 MG Oral Tablet; TAKES HALF A TABLET TWICE A DAY; Therapy: 82SFU2105 to (Evaluate:19Omy4907) Recorded  17  Pantoprazole Sodium 40 MG Oral Tablet Delayed Release; TAKE 1 TABLET PO BID FOR    GERD; Therapy: 35KCA3841 to (Maggie Lacy)  Requested for: 97MKG6988; Last    Rx:92Fwj8744 Ordered  18  Vitamin B12 TABS; Therapy: (Recorded:18Jan2016) to Recorded    Allergies   1  No Known Drug Allergies    Physical Exam    Constitutional   General appearance: No acute distress, well appearing and well nourished  WDWN MALE IN NAD  Eyes   Conjunctiva and lids: No swelling, erythema, or discharge  Pupils and irises: Equal, round and reactive to light  Ears, Nose, Mouth, and Throat   External inspection of ears and nose: Normal     Otoscopic examination: Tympanic membrance translucent with normal light reflex  Canals patent without erythema  DECREAED HEARING B/L WITH SCREENING TEST DONE IN OFFICE  Nasal mucosa, septum, and turbinates: Normal without edema or erythema  The nasal cavity was examined using a speculum  no nasal discharge, normal nasal mucosa, normal nasal septum, no intranasal masses or polyps and normal nasal turbinates  Oropharynx: Normal with no erythema, edema, exudate or lesions  Pulmonary   Respiratory effort: No increased work of breathing or signs of respiratory distress  Auscultation of lungs: Abnormal   Auscultation of the lungs revealed no expiratory wheezing, normal expiratory time and no inspiratory wheezing  no rales or crackles were heard bilaterally  no rhonchi  no friction rub  no wheezing  diminished breath sounds over both bases   no bronchial breath sounds  Cardiovascular   Palpation of heart: Normal PMI, no thrills  Auscultation of heart: Normal rate and rhythm, normal S1 and S2, without murmurs  NO S3 NOTED  Examination of extremities for edema and/or varicosities: Normal     Carotid pulses: Normal     Abdomen   Abdomen: Non-tender, no masses  NO REBOUND, SOME TENDERNESS, NORMAL BOWEL SOUNDS;    Liver and spleen: No hepatomegaly or splenomegaly  Lymphatic   Palpation of lymph nodes in neck: No lymphadenopathy  Musculoskeletal   Gait and station: Normal     Digits and nails: Normal without clubbing or cyanosis  Inspection/palpation of joints, bones, and muscles: Normal     Skin   Skin and subcutaneous tissue: Normal without rashes or lesions  Neurologic   Cranial nerves: Cranial nerves 2-12 intact  Reflexes: 2+ and symmetric  Sensation: No sensory loss  Psychiatric   Orientation to person, place and time: Normal     Mood and affect: Normal     Diabetic Foot Screen: Normal        Signatures   Electronically signed by : Jayla Thornton DO; Mar 25 2016  8:48AM EST                       (Author)    Electronically signed by :  Jayla Thornton DO; Mar 25 2016  9:32AM EST                       (Author)

## 2018-01-16 NOTE — MISCELLANEOUS
Assessment    1  Aneurysm of infrarenal abdominal aorta (441 4) (I71 4)   2  Arteriosclerotic cardiovascular disease (429 2,440 9) (I25 10)   3  Diabetes type 2, uncontrolled (250 02) (E11 65)   4  Hyperlipidemia (272 4) (E78 5)   5  GERD (gastroesophageal reflux disease) (530 81) (K21 9)   6  Benign essential hypertension (401 1) (I10)   7  Frequent falls (V15 88) (R29 6)   8  Gait disturbance (781 2) (R26 9)   9  Transition of care    Plan  Frequent falls, Gait disturbance    · *1 - [facility]  *  Status: Active  Requested for: 72YPN4269   Ordered; For: Frequent falls, Gait disturbance; Ordered By: Freedom Potter Performed:  Due: 16ENN6426  Care Summary provided  : Yes  PMH: Anxiety, Diabetes type 2, uncontrolled    · ALPRAZolam 1 MG Oral Tablet; 1/2 TAB Q 6-8 HOURS FOR ANXIETY- NO  DRIVING WITH THE MEDICATION;  NO ALCOHOL   Rx By: Freedom Potter; Dispense: 30 Days ; #:90 Tablet;  Refill: 1; For: PMH: Anxiety, Diabetes type 2, uncontrolled; PANCHITO = N; Print Rx    Discussion/Summary  Discussion Summary:   CAD- HAS FOLLOW UP WITH CARDIOLOGY- ON STATIN, ASA PLAVIX  HE HAS HAD MULTIPLE FALLS  HIS DAUGHTER FEELS HE IS MORE CONFUSED AND FORGETS THINGS   INCREASE ALPRAZOLAM TO 1/2 TAB TID - ON A SCHEDULE- TO SEE IF HIS ANXIETY IS A MAJOR COMPONENT TO HIS PAIN; IF NOT , WILL DECREASE DOSE AND USE ONLY PRN;  PT HAS FOLLOW UP  HE HAS HAD FALLS- I SUGGESTED HE DOES NOT BEND OVER OR CROUCH AND FOLLOW UP WITH PT FOR STRENGTHENING  CHECK BP AT HOME  CONSIDER MORE AGGRESSIVE USE OF PPI - IF ANTI PLATELET THERPAY IS WORSENING HIS AB PAIN  FOLLOW UP  REFER TO PT  FOLLOW UP FOR EMG , NEURO AND CARDIO EFVAL  Counseling Documentation With Imm: The patient, patient's family, patient's caretaker was counseled regarding diagnostic results, instructions for management, risk factor reductions, prognosis, patient and family education, impressions, risks and benefits of treatment options, importance of compliance with treatment  Chief Complaint  Chief Complaint Free Text Note Form: PATIENT HAD ADMISSION FOR FACIAL DROOP AND CHEST PAIN  HE HAS BEEN GETTING DIZZY AND HE FELL FROM DIZZINES--MOST RECENT FALL WAS SUNDAY  HE HAS A SWALLOWING STUDY, HE HAS FOLLOW UP WITH NEUROLOGY IN [address] [facility];   HE HAS CHEST PRESSURE WHEN HE AWAKENS FROM SLEEP - FEELS LIKE A PRESSURE EVERY AM      History of Present Illness  TCM Communication [facility]: The patient is being contacted for follow-up after hospitalization and ON 5/31/17 @ [address] [place] [address] diagnostic studies are pending  He was hospitalized at [facility]  The date of admission: 05/17/2017, date of discharge: 05/20/2017  Diagnosis: CHEST PAIN,ACUTE LEFT-SIDED WEAKNESS,DEPRESSION,PARKINSON'S DISEASE  He was discharged to home  Medications were not reviewed today  He scheduled a follow up appointment  Counseling was provided to patient's family  Topics counseled included importance of compliance with treatment  Communication performed and completed by Miguel Sabillon   HPI: HIS DAUGHTER IS CONCERNED THAT HE IS NOT TAKING ENOUGH ALPRAXOLAM; HE GETS QUITE ANXIOUS; IN [place] [address] HE TOOK ALPRAZOLAM 3X A DAY AND NO CHEST PAIN   HE IS TAKING FAMOTADINE ALSO; Review of Systems  Complete-Male:   Constitutional: feeling poorly and AB PAIN AND SOB, but No fever or chills, feels well, no tiredness, no recent weight gain or weight loss, as noted in HPI and not feeling tired  Eyes: eyesight problems and WEARS GLASSES, but No complaints of eye pain, no red eyes, no discharge from eyes, no itchy eyes  ENT: HEARING AIDS B/L, but no complaints of earache, no hearing loss, no nosebleeds, no nasal discharge, no sore throat, no hoarseness, no earache, no nosebleeds, no hearing loss and no nasal discharge     Cardiovascular: No complaints of slow heart rate, no fast heart rate, no chest pain, no palpitations, no leg claudication, no lower extremity, as noted in HPI, no chest pain, no intermittent leg claudication, no palpitations and no extremity edema  Respiratory: shortness of breath and OCCASIONAL CHEST PAIN WITH STRESS;, but as noted in HPI, no cough, no wheezing and no shortness of breath during exertion  Gastrointestinal: as noted in HPI, no nausea and no diarrhea    The patient presents with complaints of sudden onset of intermittent episodes of moderate epigastric abdominal pain, described as sharp, radiating to the epigastric area  On a scale of 1 to 10, the patient rates the pain as 7  Symptoms are not improved by antiemetics, antacids, bismuth subsalicylate, h2 blockers and PPIs  Symptoms are not made worse by eating, drinking, fatty foods, dairy foods and alcohol  Symptoms are unchanged  Genitourinary: No complaints of dysuria, no incontinence, no hesitancy, no nocturia, no genital lesion, no testicular pain, no urinary hesitancy and no nocturia  Musculoskeletal: No complaints of arthralgia, no myalgias, no joint swelling or stiffness, no limb pain or swelling, as noted in HPI, no arthralgias, no joint swelling and no joint stiffness  Integumentary: No complaints of skin rash or skin lesions, no itching, no skin wound, no dry skin, no rashes and no skin lesions  Neurological: No compliants of headache, no confusion, no convulsions, no numbness or tingling, no dizziness or fainting, no limb weakness, no difficulty walking, no headache, no numbness, no confusion, no dizziness and no convulsions  Psychiatric: Is not suicidal, no sleep disturbances, no anxiety or depression, no change in personality, no emotional problems, no anxiety and no depression  Endocrine: No complaints of proptosis, no hot flashes, no muscle weakness, no erectile dysfunction, no deepening of the voice, no feelings of weakness, no proptosis, no muscle weakness and no hot flashes     Hematologic/Lymphatic: No complaints of swollen glands, no swollen glands in the neck, does not bleed easily, no easy bruising, no swollen glands and no swollen glands in the neck  ROS Reviewed:   ROS reviewed  Active Problems     1  Abdominal pain, acute, epigastric (789 06,338 19) (R10 13)   2  Anemia (285 9) (D64 9)   3  Benign essential hypertension (401 1) (I10)   4  Essential tremor (333 1) (G25 0)   5  Murmur (785 2) (R01 1)   6  Onychomycosis (110 1) (B35 1)   7  Peripheral vascular disease (443 9) (I73 9)   8  Pharyngoesophageal dysphagia (787 24) (R13 14)   9  Sleep disturbances (780 50) (G47 9)   10  Thoracic ascending aortic aneurysm (441 2) (I71 2)    Hyperlipidemia (272 4) (E78 5)       Arteriosclerotic cardiovascular disease (429 2) (I25 10)       Diabetes type 2, uncontrolled (250 02) (E11 65)       GERD (gastroesophageal reflux disease) (530 81) (K21 9)       Aneurysm of infrarenal abdominal aorta (441 4) (I71 4)          Past Medical History    1  History of Angiographic Coronary Findings   2  History of shortness of breath (V13 89) (Z87 898)   3  History of Medicare annual wellness visit, subsequent (V70 0) (Z00 00)   4  History of Thoracic ascending aortic aneurysm (441 2) (I71 2)    Surgical History    1  History of Hernia Repair   2  History of Surgery For Popliteal Artery Aneurysm  Surgical History Reviewed: The surgical history was reviewed and updated today  Family History  Family History Reviewed: The family history was reviewed and updated today  Social History    · Former smoker (X97 81) (I85 725)   · Stopped Drinking Alcohol  Social History Reviewed: The social history was reviewed and updated today  The social history was reviewed and is unchanged  Current Meds   1  Accu-Chek Barbara Plus In Vitro Strip; TEST TWICE DAILY; Therapy: 07Apr2017 to (Evaluate:93Ifb5409); Last Rx:07Apr2017 Ordered   2  Accu-Chek Barbara Plus w/Device Kit; USE AS DIRECTED; Therapy: 61OEM9489 to (Last Rx:07Apr2017) Ordered   3   ALPRAZolam 1 MG Oral Tablet; 1/2 TAB Q 6-8 HOURS FOR ANXIETY- NO DRIVING WITH   THE MEDICATION;  NO ALCOHOL; Therapy: 36LRS3073 to (Evaluate:31May2017)  Requested for: 76RYC6915; Last   LW:36UHZ5068 Ordered   4  Aspirin 81 MG TABS; TAKE 1 TABLET DAILY; Therapy: 86RUZ0858 to Recorded   5  Atorvastatin Calcium 40 MG Oral Tablet; Therapy: (Recorded:19Kdb0834) to Recorded   6  Carbidopa-Levodopa  MG Oral Tablet; TAKE 1 TABLET IN THE AM FOR   TREMORS;   Therapy: 80Tsd5465 to (Evaluate:71Uyb0853)  Requested for: 20Mar2017; Last   Rx:20Mar2017 Ordered   7  Citalopram Hydrobromide 20 MG Oral Tablet; 1 TAB MID DAY-DOSAGE CHANGE;   Therapy: 11AKI1530 to (Evaluate:15Oct2017)  Requested for: 18Apr2017; Last   Rx:18Apr2017 Ordered   8  Clopidogrel Bisulfate 75 MG Oral Tablet; TAKE 1 TABLET DAILY; Therapy: (Recorded:06Nov2014) to Recorded   9  CoQ10 CAPS; Therapy: (Recorded:18Jan2016) to Recorded   10  Docusate Sodium 100 MG Oral Capsule; TAKE 1 CAPSULE DAILY; Therapy: (Recorded:06Nov2014) to Recorded   11  Famotidine 40 MG Oral Tablet; 1 TAB AT SUPPER  FOR ABDOMINAL PAIN- DO NOT TAKE    WITH OTHER MEDS; Therapy: 37ETN5795 to (Evaluate:17Oct2017)  Requested for: 21Mar2017; Last    Rx:21Mar2017 Ordered   12  Isosorbide Mononitrate ER 60 MG Oral Tablet Extended Release 24 Hour; TAKE 1    TABLET ONCE DAILY; Therapy: 97JAD4295 to Recorded   13  Lisinopril 20 MG Oral Tablet; Therapy: (Recorded:31May2017) to Recorded   14  MetFORMIN HCl - 1000 MG Oral Tablet; TAKE 1 TABLET TWICE DAILY; Therapy: 70OPA7137 to (Liz Shah) Recorded   15  Metoprolol Tartrate 25 MG Oral Tablet; TAKES HALF A TABLET TWICE A DAY; Therapy: 67WRR7706 to (Liz Shah) Recorded   16  Pantoprazole Sodium 40 MG Oral Tablet Delayed Release; take 1 tablet twice a day; Therapy: 82ZOW2984 to [phone])  Requested for: 01Apr2017; Last    Rx:01Apr2017 Ordered   17  Vitamin B12 TABS; Therapy: (Recorded:18Jan2016) to Recorded    Allergies    1   No Known Drug Allergies    Vitals  Signs   Recorded: 98MRZ4842 10:38AM   Temperature: 98 5 F, Tympanic  Heart Rate: 80  Respiration: 16  Systolic: 005, LUE, Sitting  Diastolic: 70, LUE, Sitting  Height: 5 ft 7 in  Weight: 198 lb 4 oz  BMI Calculated: 31 05  BSA Calculated: 2 01    Physical Exam    Constitutional   General appearance: No acute distress, well appearing and well nourished  WDWN MALE IN NAD  Eyes   Conjunctiva and lids: No swelling, erythema, or discharge  Pupils and irises: Equal, round and reactive to light  Ears, Nose, Mouth, and Throat   External inspection of ears and nose: Normal     Otoscopic examination: Tympanic membrance translucent with normal light reflex  Canals patent without erythema  DECREAED HEARING B/L WITH SCREENING TEST DONE IN OFFICE  Nasal mucosa, septum, and turbinates: Normal without edema or erythema  The nasal cavity was examined using a speculum  no nasal discharge, normal nasal mucosa, normal nasal septum, no intranasal masses or polyps and normal nasal turbinates  Oropharynx: Normal with no erythema, edema, exudate or lesions  Pulmonary   Respiratory effort: No increased work of breathing or signs of respiratory distress  Auscultation of lungs: Abnormal   Auscultation of the lungs revealed no expiratory wheezing, normal expiratory time and no inspiratory wheezing  no rales or crackles were heard bilaterally  no rhonchi  no friction rub  no wheezing  diminished breath sounds over both bases  no bronchial breath sounds  Cardiovascular   Palpation of heart: Normal PMI, no thrills  Auscultation of heart: Normal rate and rhythm, normal S1 and S2, without murmurs  NO S3 NOTED  Examination of extremities for edema and/or varicosities: Normal     Carotid pulses: Normal     Abdomen   Abdomen: Abnormal   The abdomen was flat  Skin findings: no collateral vein dilation and no ostomy  There was mild tenderness in the epigastric area and in the right lower quadrant   The abdomen was not firm  No guarding  There was a negative Oscar's sign and negative psoas sign  no masses palpated  no CVA tenderness SOME EPIGASTRIC KPNMAXP4MDP ON DEEP PALPATION; SOME PAIN RLQ ON DEEP PALPATION; NAV L BS NO REBOUND NO GUARDING  Liver and spleen: No hepatomegaly or splenomegaly  NO CVA TENDERNESS  Lymphatic   Palpation of lymph nodes in neck: No lymphadenopathy  Musculoskeletal   Gait and station: Normal     Digits and nails: Normal without clubbing or cyanosis  Inspection/palpation of joints, bones, and muscles: Normal     Skin   Skin and subcutaneous tissue: Normal without rashes or lesions  Neurologic   Cranial nerves: Cranial nerves 2-12 intact  Reflexes: 2+ and symmetric  Sensation: No sensory loss  Psychiatric   Orientation to person, place and time: Normal     Mood and affect: Normal     Diabetic Foot Screen: Normal        Health Management  History of Diabetes mellitus   (1) HEMOGLOBIN A1C; every 3 months; Last 98ZCR6852; Next Due: 22HGC3490; Overdue  (1) MICROALBUMIN, 24HR URINE; every 1 year; Next Due: 36KYS7136; Overdue  *VB - Foot Exam; every 1 year; Next Due: 30KRO1682; Overdue  Health Maintenance   COLONOSCOPY; every 10 years; Last 70UUZ6674; Next Due: 35GEI9690; Active    Future Appointments    Date/Time Provider Specialty Site   06/02/2017 02:20 PM KERI Bundy  Cardiology [id number] [place] [address]   05/31/2017 01:00 PM Flora Hoang DO Physiatry [address] OUTPATIENT   06/08/2017 11:15 AM Iraj Hernandez [facility] Neurology [place] [address]     Signatures   Electronically signed by : Corinne Fowler, ; May 22 2017  1:45PM EST                       (Author)    Electronically signed by :  Jayla [address] [place] [address]; May 31 2017 12:38PM EST                       (Author)

## 2018-01-18 ENCOUNTER — TRANSCRIBE ORDERS (OUTPATIENT)
Dept: LAB | Facility: HOSPITAL | Age: 73
End: 2018-01-18

## 2018-01-18 ENCOUNTER — LAB (OUTPATIENT)
Dept: LAB | Facility: HOSPITAL | Age: 73
End: 2018-01-18
Payer: MEDICARE

## 2018-01-18 DIAGNOSIS — E55.9 AVITAMINOSIS D: Primary | ICD-10-CM

## 2018-01-18 DIAGNOSIS — E11.65 TYPE 2 DIABETES MELLITUS WITH HYPERGLYCEMIA (HCC): ICD-10-CM

## 2018-01-18 DIAGNOSIS — E55.9 AVITAMINOSIS D: ICD-10-CM

## 2018-01-18 LAB
25(OH)D3 SERPL-MCNC: 19 NG/ML (ref 30–100)
ALBUMIN SERPL BCP-MCNC: 3.7 G/DL (ref 3.5–5)
ALP SERPL-CCNC: 70 U/L (ref 46–116)
ALT SERPL W P-5'-P-CCNC: 24 U/L (ref 12–78)
ANION GAP SERPL CALCULATED.3IONS-SCNC: 4 MMOL/L (ref 4–13)
AST SERPL W P-5'-P-CCNC: 22 U/L (ref 5–45)
BASOPHILS # BLD AUTO: 0.02 THOUSANDS/ΜL (ref 0–0.1)
BASOPHILS NFR BLD AUTO: 0 % (ref 0–1)
BILIRUB SERPL-MCNC: 0.82 MG/DL (ref 0.2–1)
BUN SERPL-MCNC: 14 MG/DL (ref 5–25)
CALCIUM SERPL-MCNC: 9.2 MG/DL (ref 8.3–10.1)
CHLORIDE SERPL-SCNC: 105 MMOL/L (ref 100–108)
CHOLEST SERPL-MCNC: 100 MG/DL (ref 50–200)
CO2 SERPL-SCNC: 31 MMOL/L (ref 21–32)
CREAT SERPL-MCNC: 0.82 MG/DL (ref 0.6–1.3)
CREAT UR-MCNC: 80.5 MG/DL
EOSINOPHIL # BLD AUTO: 0.24 THOUSAND/ΜL (ref 0–0.61)
EOSINOPHIL NFR BLD AUTO: 5 % (ref 0–6)
ERYTHROCYTE [DISTWIDTH] IN BLOOD BY AUTOMATED COUNT: 13.4 % (ref 11.6–15.1)
EST. AVERAGE GLUCOSE BLD GHB EST-MCNC: 143 MG/DL
GFR SERPL CREATININE-BSD FRML MDRD: 88 ML/MIN/1.73SQ M
GLUCOSE P FAST SERPL-MCNC: 114 MG/DL (ref 65–99)
HBA1C MFR BLD: 6.6 % (ref 4.2–6.3)
HCT VFR BLD AUTO: 42 % (ref 36.5–49.3)
HDLC SERPL-MCNC: 54 MG/DL (ref 40–60)
HGB BLD-MCNC: 13.8 G/DL (ref 12–17)
LDLC SERPL CALC-MCNC: 26 MG/DL (ref 0–100)
LYMPHOCYTES # BLD AUTO: 1.44 THOUSANDS/ΜL (ref 0.6–4.47)
LYMPHOCYTES NFR BLD AUTO: 29 % (ref 14–44)
MCH RBC QN AUTO: 32.9 PG (ref 26.8–34.3)
MCHC RBC AUTO-ENTMCNC: 32.9 G/DL (ref 31.4–37.4)
MCV RBC AUTO: 100 FL (ref 82–98)
MICROALBUMIN UR-MCNC: 14.7 MG/L (ref 0–20)
MICROALBUMIN/CREAT 24H UR: 18 MG/G CREATININE (ref 0–30)
MONOCYTES # BLD AUTO: 0.43 THOUSAND/ΜL (ref 0.17–1.22)
MONOCYTES NFR BLD AUTO: 9 % (ref 4–12)
NEUTROPHILS # BLD AUTO: 2.86 THOUSANDS/ΜL (ref 1.85–7.62)
NEUTS SEG NFR BLD AUTO: 57 % (ref 43–75)
NRBC BLD AUTO-RTO: 0 /100 WBCS
PLATELET # BLD AUTO: 176 THOUSANDS/UL (ref 149–390)
PMV BLD AUTO: 12.1 FL (ref 8.9–12.7)
POTASSIUM SERPL-SCNC: 3.8 MMOL/L (ref 3.5–5.3)
PROT SERPL-MCNC: 7.6 G/DL (ref 6.4–8.2)
RBC # BLD AUTO: 4.19 MILLION/UL (ref 3.88–5.62)
SODIUM SERPL-SCNC: 140 MMOL/L (ref 136–145)
TRIGL SERPL-MCNC: 100 MG/DL
TSH SERPL DL<=0.05 MIU/L-ACNC: 1.54 UIU/ML (ref 0.36–3.74)
VALPROATE SERPL-MCNC: <3 UG/ML (ref 50–100)
WBC # BLD AUTO: 5 THOUSAND/UL (ref 4.31–10.16)

## 2018-01-18 PROCEDURE — 36415 COLL VENOUS BLD VENIPUNCTURE: CPT

## 2018-01-18 PROCEDURE — 82570 ASSAY OF URINE CREATININE: CPT

## 2018-01-18 PROCEDURE — 80053 COMPREHEN METABOLIC PANEL: CPT

## 2018-01-18 PROCEDURE — 84443 ASSAY THYROID STIM HORMONE: CPT

## 2018-01-18 PROCEDURE — 82306 VITAMIN D 25 HYDROXY: CPT

## 2018-01-18 PROCEDURE — 80164 ASSAY DIPROPYLACETIC ACD TOT: CPT

## 2018-01-18 PROCEDURE — 82043 UR ALBUMIN QUANTITATIVE: CPT

## 2018-01-18 PROCEDURE — 83036 HEMOGLOBIN GLYCOSYLATED A1C: CPT

## 2018-01-18 PROCEDURE — 85025 COMPLETE CBC W/AUTO DIFF WBC: CPT

## 2018-01-18 PROCEDURE — 80061 LIPID PANEL: CPT

## 2018-01-18 NOTE — MISCELLANEOUS
Message   Date: 05 Feb 2016 1:20 PM EST, Recorded By: Tia Andrew For: Claudia Whitneyt: Denisse Hernandez, Self   Phone: [phone] (Home), [phone] (Work)   Reason: Medical Complaint   Patient called with loose cough xs 1 week  Mucus is milky white  He has only been taking Nyquil  I advised patient to try Mucinex and Sugar Free Robatussin  I advised if cough does not improve to call office next week to be seen  call pt - i will call in abx- [facility] follow up next week if no better  Active Problems    1  Abnormal blood chemistry (790 6) (R79 9)   2  Anemia (285 9) (D64 9)   3  Aneurysm of infrarenal abdominal aorta (441 4) (I71 4)   4  Anxiety (300 00) (F41 9)   5  Benign essential hypertension (401 1) (I10)   6  CAD (coronary atherosclerotic disease) (414 00) (I25 10)   7  Cervical paraspinous muscle spasm (728 85) (M62 838)   8  Decreased hearing, bilateral (389 9) (H91 93)   9  Diabetes mellitus (250 00) (E11 9)   10  Diabetes type 2, uncontrolled (250 02) (E11 65)   11  Dyspnea (786 09) (R06 00)   12  Encounter for prostate cancer screening (V76 44) (Z12 5)   13  Encounter for screening colonoscopy (V76 51) (Z12 11)   14  GERD (gastroesophageal reflux disease) (530 81) (K21 9)   15  Hyperlipidemia (272 4) (E78 5)   16  Medicare annual wellness visit, subsequent (V70 0) (Z00 00)   17  Mental status change (780 97) (R41 82)   18  Murmur (785 2) (R01 1)   19  Need for prophylactic vaccination against Streptococcus pneumoniae (pneumococcus)    (V03 82) (Z23)   20  Need for prophylactic vaccination and inoculation against influenza (V04 81) (Z23)   21  Onychomycosis (110 1) (B35 1)   22  Peripheral vascular disease (443 9) (I73 9)   23  Shortness of breath (786 05) (R06 02)   24  Sleep disturbances (780 50) (G47 9)    Current Meds   1  ALPRAZolam 0 5 MG Oral Tablet; 1/2-1 TAB PO Q 8 HOURS PRN ANXIETY; Therapy: 38NXW0513 to (Evaluate:10Jan2016); Last Rx:11Nov2015 Ordered   2  Aspirin 81 MG Oral Tablet; TAKE 1 TABLET DAILY; Therapy: 91NZP7615 to Recorded   3  Atorvastatin Calcium 10 MG Oral Tablet; TAKE 1 TABLET DAILY; Therapy: (Recorded:09Oct2015) to Recorded   4  Clopidogrel Bisulfate 75 MG Oral Tablet; TAKE 1 TABLET DAILY; Therapy: (Recorded:06Nov2014) to Recorded   5  CoQ10 CAPS; Therapy: (Recorded:18Jan2016) to Recorded   6  Cyclobenzaprine HCl - 10 MG Oral Tablet; 1/2 TO 1 TAB QHS FOR 2 WEEKS FOR   SPASM; Therapy: 10AST0494 to (Evaluate:29Jan2016)  Requested for: 57JHV4266; Last   Rx:19Jan2016 Ordered   7  Docusate Sodium 100 MG Oral Capsule; TAKE 1 CAPSULE DAILY; Therapy: (Recorded:06Nov2014) to Recorded   8  Famotidine 40 MG Oral Tablet; Therapy: (Recorded:18Jan2016) to Recorded   9  FLUoxetine HCl - 10 MG Oral Tablet; TAKE 1 TABLET DAILY AS DIRECTED; Therapy: 19WMY8943 to (Evaluate:31Mar2016)  Requested for: 95Amz3588; Last   Rx:60Inj5449 Ordered   10  Gemfibrozil 600 MG Oral Tablet; TAKE 1 TABLET TWICE DAILY; Therapy: 99TPR5977 to Recorded   11  Iron TABS; TAKE 1 TABLET ONCE DAILY; Therapy: 49QUR5477 to Recorded   12  Isosorbide Mononitrate ER 60 MG Oral Tablet Extended Release 24 Hour; TAKE 1    TABLET ONCE DAILY; Therapy: 72RSJ5438 to Recorded   13  Lisinopril 10 MG Oral Tablet; Therapy: (Recorded:18Jan2016) to Recorded   14  MetFORMIN HCl - 1000 MG Oral Tablet; TAKE 1 TABLET TWICE DAILY; Therapy: 98ZHJ4070 to (Carina Jin) Recorded   15  Metoprolol Tartrate 25 MG Oral Tablet; TAKES HALF A TABLET TWICE A DAY; Therapy: 12EZQ5635 to (Carina Jin) Recorded   16  Pantoprazole Sodium 40 MG Oral Tablet Delayed Release; TAKE 1 TABLET PO BID    FOR GERD; Therapy: 87CRM8540 to (Hadley Medrano)  Requested for: 59JJT7276; Last    Rx:69Coe7795 Ordered   17  Vitamin B12 TABS; Therapy: (Recorded:18Jan2016) to Recorded    Allergies    1   No Known Drug Allergies    Signatures   Electronically signed by : Ricardo Haney, ; Feb 5 2016 1:24PM EST                       (Author)    Electronically signed by :  Zoe Anderson DO; Feb 5 2016  2:12PM EST                       (Author)

## 2018-01-18 NOTE — RESULT NOTES
Verified Results  * XR SPINE CERVICAL COMPLETE 4 OR 5 VW NON INJURY 01Aug2017 06:09AM Brooke Aj Order Number: [id number]     Test Name Result Flag Reference   XR SPINE CERVICAL COMPLETE 4 OR 5 VW (Report)     CERVICAL SPINE     INDICATION: Z02 554: Other muscle spasm  History taken directly from the electronic ordering system  COMPARISON: None     VIEWS: 5     IMAGES: 6     FINDINGS:     Multilevel endplate degenerative changes, most significantly at C4-C5 and C5-C6  Lateral masses are symmetric  Multilevel uncovertebral and facet arthropathy  Right neural foramen are widely patent  Although suboptimally profiled, question mild left foraminal stenosis at C5-C6 and moderate C6-C7, again, the neural foramina on the left are not as well profiled  Bilateral carotid calcifications, left greater than right  The lung apices are intact  IMPRESSION:     Multilevel cervical spondylosis  Suggestion of more focal neural foraminal stenosis on the left  Workstation performed: [id number]     Signed by:   Elizabeth Bryan MD   8/2/17     * XR SPINE THORACIC 3 VIEW 01Aug2017 06:09AM Jaylaewelina Finley    Order Number: [id number]     Test Name Result Flag Reference   XR SPINE THORACIC 3 VW (Report)     THORACIC SPINE     INDICATION: Back pain  COMPARISON: None     VIEWS: AP, lateral and coned-down projections     IMAGES: 4     FINDINGS:     Thoracic vertebrae demonstrate normal stature and alignment  There is no fracture or pathologic bone lesion  There is no displacement of the paraspinal line  The pedicles are intact  IMPRESSION:     No acute fracture or traumatic listhesis          Workstation performed: [id number]     Signed by:   Elizabeth Bryan MD   8/2/17

## 2018-01-20 ENCOUNTER — GENERIC CONVERSION - ENCOUNTER (OUTPATIENT)
Dept: OTHER | Facility: OTHER | Age: 73
End: 2018-01-20

## 2018-01-22 VITALS
WEIGHT: 193.4 LBS | TEMPERATURE: 98.4 F | RESPIRATION RATE: 18 BRPM | SYSTOLIC BLOOD PRESSURE: 110 MMHG | HEIGHT: 67 IN | HEART RATE: 76 BPM | BODY MASS INDEX: 30.35 KG/M2 | DIASTOLIC BLOOD PRESSURE: 78 MMHG

## 2018-01-22 VITALS
WEIGHT: 193 LBS | HEIGHT: 67 IN | SYSTOLIC BLOOD PRESSURE: 110 MMHG | RESPIRATION RATE: 16 BRPM | DIASTOLIC BLOOD PRESSURE: 64 MMHG | TEMPERATURE: 98.1 F | BODY MASS INDEX: 30.29 KG/M2 | HEART RATE: 78 BPM

## 2018-01-22 VITALS
DIASTOLIC BLOOD PRESSURE: 70 MMHG | RESPIRATION RATE: 16 BRPM | HEIGHT: 67 IN | SYSTOLIC BLOOD PRESSURE: 116 MMHG | WEIGHT: 198.25 LBS | TEMPERATURE: 98.5 F | HEART RATE: 80 BPM | BODY MASS INDEX: 31.11 KG/M2

## 2018-01-23 VITALS
WEIGHT: 203 LBS | BODY MASS INDEX: 31.86 KG/M2 | DIASTOLIC BLOOD PRESSURE: 78 MMHG | TEMPERATURE: 97.6 F | SYSTOLIC BLOOD PRESSURE: 124 MMHG | HEIGHT: 67 IN

## 2018-01-23 VITALS
WEIGHT: 203 LBS | TEMPERATURE: 96.7 F | BODY MASS INDEX: 31.86 KG/M2 | SYSTOLIC BLOOD PRESSURE: 138 MMHG | DIASTOLIC BLOOD PRESSURE: 80 MMHG | HEART RATE: 68 BPM | RESPIRATION RATE: 18 BRPM | HEIGHT: 67 IN

## 2018-01-23 NOTE — MISCELLANEOUS
Assessment   1  Diabetes type 2, uncontrolled (250 02) (E11 65)  2  Benign essential hypertension (401 1) (I10)  3  GERD (gastroesophageal reflux disease) (530 81) (K21 9)  4  Abdominal pain, acute, epigastric (789 06,338 19) (R10 13)    Plan  Abdominal pain, acute, epigastric    · Start: Famotidine 40 MG Oral Tablet; 1 TAB AT SUPPER  FOR ABDOMINAL PAIN  Rx By: Faraz De Souza; Dispense: 30 Days ; #:30 Tablet; Refill: 6;For: Abdominal   pain, acute, epigastric; PANCHITO = N; Sent To: [facility] Abdominal pain, acute, epigastric, Anemia, GERD (gastroesophageal reflux disease)    · 3 - Dedrick Townsend DO  (Gastroenterology) Physician Referral  Consult Only: the expectation  is that the referring provider will communicate back to the patient on treatment options  Evaluation and Treatment: the expectation is that the referred to provider will  communicate back to the patient on treatment options  Status: Hold For - Scheduling   Requested for: [id number]  Ordered; For: Abdominal pain, acute, epigastric, Anemia, GERD (gastroesophageal reflux   disease); Ordered By: Faraz De Souza  Performed:     Due: 67DTA4877  YOU are Referring to a non- Preferred Provider : Established Patient  (MU) Care Summary provided  : Yes  Benign essential hypertension    · (1) COMPREHENSIVE METABOLIC PANEL; Status:Active; Requested for:41Cpc6351;   Perform:[facility]; Order Comments:COPY TO DR Roderick Tellez; [id number]; Ordered; For:Benign essential hypertension; Ordered   By:Brotzman, Antone Cowden;    Discussion/Summary  Discussion Summary:   ADD FAMOTADINE 40 MG AT SUPPER- HE SEEMS TO HAVE EPIGASTRIC PAIN- MAYBE FROM PLAVIX? - ADD FAMOTADINE, CONTINE PPI BID AND FOLOW UP WITH GI FOR POSSIBLE EGD/FOLLOW UP  OBTAIN LABS  FOLLOW UP WITH CARDIO  FOLLOW UP WITH GI- DR SIMONA HUNTER FOLLOW BLOOD COUNT;    Patient's Capacity to Self-Care: Patient is able to Self-Care        Chief Complaint  Chief Complaint Free Text Note Form: PATIENT HERE FOR JOAQUIM AFTER BEING HOSPITALIZED FOR SOB  HE HAS HX OF DM  HE HAD UPPER AB PAIN AND SOB- WENT TO ER- WAS HOSPITALZIED AND HAD WORK UP AT [address] [facility]- HAD GI EVAL AND WAS SEEN BY DR CROSS/SIMONA - GI - US WAS DONE- NEGATIVE FOR STONES- REC CTA AND EGD AS OUT PATIENT  PT WAS D/C ON HIGHER DOSE OF LISINOPRIL- HE WAS OK ON D/C UNTIL THE LAST FEW NIGHTS- HAD UPPER AB PAIN-P FEELS THE SAME - LAST NIGHT WAS WORSE WITH PAIN - FELT BETTER LAYING ON HIS SIDE; History of Present Illness  TCM Communication [facility]: The patient is being contacted for follow-up after hospitalization and AB PAIN, SOB  Hospital records were reviewed  He was hospitalized [facility]  The date of admission: 02/12/17, date of discharge: 02/15/17  Diagnosis: DYSPNEA,EPIGASTRIC PAIN  He was discharged to home  Medications were not reviewed today  Counseling was provided to patient's family  Topics counseled included importance of compliance with treatment  Communication performed and completed by Arch Paget   HPI: UPPER AB PAIN, SOB- WORSE LAST NIGHT- HE WAS ADMITTED INTO [facility] RECENTLY FOR SIMILAR SYMOTMS   HE IS COMPLIANT WITH HIS MEDS; HE IS OCCASIONALLY CONFUSED   HIS DAUGHTER HELPS TO CARE FOR HIM BUT HAS PSYCHOLOGICAL ISSUES1        1 Amended By: Dipika Salazar; Feb 22 2017 12:25 PM EST    Review of Systems  Complete-Male:   Constitutional:1  feeling poorly1  and WAS UP LAST NIGHT WITH ABDOMINAL PAIN AND SOB1 , but No fever or chills, feels well, no tiredness, no recent weight gain or weight loss1 , as noted in HPI1  and not feeling tired1   Eyes:1  eyesight problems1  and WEARS GLASSES1 , but No complaints of eye pain, no red eyes, no discharge from eyes, no itchy eyes1      ENT:1  HEARING AIDS B/L1 , but no complaints of earache, no hearing loss, no nosebleeds, no nasal discharge, no sore throat, no hoarseness1 , no earache1 , no nosebleeds1 , no hearing loss1  and no nasal discharge1   Cardiovascular: 1  No complaints of slow heart rate, no fast heart rate, no chest pain, no palpitations, no leg claudication, no lower extremity1 , as noted in HPI1 , no chest pain1 , no intermittent leg claudication1 , no palpitations1  and no extremity edema1   Respiratory:1  shortness of breath1 , shortness of breath during exertion1  and OCCASIONAL CHEST PAIN WITH STRESS;1 , but as noted in HPI1 , no cough1  and no wheezing1   Gastrointestinal:1  as noted in HPI1 , no nausea1  and no diarrhea1     The patient presents with complaints of sudden onset of intermittent episodes of moderate epigastric abdominal pain, described as sharp, radiating to the epigastric area  On a scale of 1 to 10, the patient rates the pain as 7  Symptoms are not improved by antiemetics, antacids, bismuth subsalicylate, h2 blockers and PPIs  Symptoms are not made worse by eating, drinking, fatty foods, dairy foods and alcohol  Symptoms are unchanged  1   Genitourinary:1  No complaints of dysuria, no incontinence, no hesitancy, no nocturia, no genital lesion, no testicular pain1 , no urinary hesitancy1  and no nocturia1   Musculoskeletal:1  No complaints of arthralgia, no myalgias, no joint swelling or stiffness, no limb pain or swelling1 , as noted in HPI1 , no arthralgias1 , no joint swelling1  and no joint stiffness1   Integumentary:1  No complaints of skin rash or skin lesions, no itching, no skin wound, no dry skin1 , no rashes1  and no skin lesions1   Neurological:1  confusion1 , but No compliants of headache, no confusion, no convulsions, no numbness or tingling, no dizziness or fainting, no limb weakness, no difficulty walking1 , no headache1 , no numbness1 , no dizziness1  and no convulsions1   Psychiatric:1  Is not suicidal, no sleep disturbances, no anxiety or depression, no change in personality, no emotional problems1 , no anxiety1  and no depression1      Endocrine:1  No complaints of proptosis, no hot flashes, no muscle weakness, no erectile dysfunction, no deepening of the voice, no feelings of weakness1 , no proptosis1 , no muscle weakness1  and no hot flashes1   Hematologic/Lymphatic:1  No complaints of swollen glands, no swollen glands in the neck, does not bleed easily, no easy bruising1 , no swollen glands1  and no swollen glands in the neck1   ROS Reviewed:   ROS reviewed  1        1 Amended By: Alex Lucas; Feb 22 2017 12:33 PM EST    Active Problems   1  Anemia (285 9) (D64 9)  2  Aneurysm of infrarenal abdominal aorta (441 4) (I71 4)  3  Arteriosclerotic cardiovascular disease (429 2,440 9) (I25 10)  4  Benign essential hypertension (401 1) (I10)  5  Diabetes type 2, uncontrolled (250 02) (E11 65)  6  GERD (gastroesophageal reflux disease) (530 81) (K21 9)  7  Hyperlipidemia (272 4) (E78 5)  8  Murmur (785 2) (R01 1)  9  Onychomycosis (110 1) (B35 1)  10  Peripheral vascular disease (443 9) (I73 9)  11  Sleep disturbances (780 50) (G47 9)  12  Thoracic ascending aortic aneurysm (441 2) (I71 2)    Past Medical History   1  History of Angiographic Coronary Findings  2  History of shortness of breath (V13 89) (Z87 898)  3  History of Medicare annual wellness visit, subsequent (V70 0) (Z00 00)  4  History of Thoracic ascending aortic aneurysm (441 2) (I71 2)    Surgical History   1  History of Hernia Repair  2  History of Surgery For Popliteal Artery Aneurysm    Social History    · Former smoker (A52 87) (M01 550)   · Stopped Drinking Alcohol    Current Meds  1  ALPRAZolam 0 5 MG Oral Tablet; TAKE 1/2 TO 1 TABLET EVERY 8 HOURS IF NEEDED   FOR ANXIETY; Therapy: 23KEZ4837 to (Elizabeth Gay)  Requested for: 25ZFX6729; Last   Rx:03Kld2534 Ordered  2  Aspirin 81 MG TABS; TAKE 1 TABLET DAILY; Therapy: 15CHK7274 to Recorded  3  Atorvastatin Calcium 10 MG Oral Tablet; TAKE 1 TABLET DAILY; Therapy: (Recorded:09Oct2015) to Recorded  4   Carbidopa-Levodopa  MG Oral Tablet; TAKE 1 TABLET IN THE AM FOR   TREMORS;   Therapy: 26LIC8254 to (Evaluate:28Mar2017)  Requested for: 33TJN0154; Last   Rx:28Nov2016 Ordered  5  Citalopram Hydrobromide 20 MG Oral Tablet; take 1 tablet by mouth in AM and half tablet   by mouth in PM;   Therapy: 75RXE1522 to (Evaluate:11Rcs6825)  Requested for: 05IIO1951; Last   IK:23QJJ6600 Ordered  6  Clopidogrel Bisulfate 75 MG Oral Tablet; TAKE 1 TABLET DAILY; Therapy: (Recorded:06Nov2014) to Recorded  7  CoQ10 CAPS; Therapy: (Recorded:18Jan2016) to Recorded  8  Cyclobenzaprine HCl - 10 MG Oral Tablet; 1/2 TO 1 TAB QHS FOR 2 WEEKS FOR   SPASM; Therapy: 76KMV8149 to (Evaluate:29Jan2016)  Requested for: 78NBL7906; Last   Rx:19Jan2016 Ordered  9  Docusate Sodium 100 MG Oral Capsule; TAKE 1 CAPSULE DAILY; Therapy: (Recorded:06Nov2014) to Recorded  10  Iron TABS; TAKE 1 TABLET ONCE DAILY; Therapy: 21VSF8963 to Recorded  11  Isosorbide Mononitrate ER 60 MG Oral Tablet Extended Release 24 Hour; TAKE 1    TABLET ONCE DAILY; Therapy: 41KEX9804 to Recorded  12  Lisinopril 10 MG Oral Tablet; Therapy: (Recorded:18Jan2016) to Recorded  13  MetFORMIN HCl - 1000 MG Oral Tablet; TAKE 1 TABLET TWICE DAILY; Therapy: 45CZI3917 to (Liz Shah) Recorded  14  Metoprolol Tartrate 25 MG Oral Tablet; TAKES HALF A TABLET TWICE A DAY; Therapy: 97XUK5088 to (Liz Shah) Recorded  15  Pantoprazole Sodium 40 MG Oral Tablet Delayed Release; take 1 tablet twice a day; Therapy: 35UPQ5526 [facility])  Requested for: 70DKJ6291; Last    Rx:[id number] Ordered  16  Vitamin B12 TABS; Therapy: (Recorded:18Jan2016) to Recorded    Allergies   1  No Known Drug Allergies    Physical Exam    Constitutional   General appearance: No acute distress, well appearing and well nourished  WDWN MALE IN NAD  Eyes   Conjunctiva and lids: No swelling, erythema, or discharge  Pupils and irises: Equal, round and reactive to light      Ears, Nose, Mouth, and Throat   External inspection of ears and nose: Normal     Otoscopic examination: Tympanic membrance translucent with normal light reflex  Canals patent without erythema  DECREAED HEARING B/L WITH SCREENING TEST DONE IN OFFICE  Nasal mucosa, septum, and turbinates: Normal without edema or erythema  The nasal cavity was examined using a speculum  no nasal discharge, normal nasal mucosa, normal nasal septum, no intranasal masses or polyps and normal nasal turbinates  Oropharynx: Normal with no erythema, edema, exudate or lesions  Pulmonary   Respiratory effort: No increased work of breathing or signs of respiratory distress  Auscultation of lungs: Abnormal   Auscultation of the lungs revealed no expiratory wheezing, normal expiratory time and no inspiratory wheezing  no rales or crackles were heard bilaterally  no rhonchi  no friction rub  no wheezing  diminished breath sounds over both bases  no bronchial breath sounds  Cardiovascular   Palpation of heart: Normal PMI, no thrills  Auscultation of heart: Normal rate and rhythm, normal S1 and S2, without murmurs  NO S3 NOTED  Examination of extremities for edema and/or varicosities: Normal     Carotid pulses: Normal     Abdomen   Abdomen: Abnormal   The abdomen was rounded  Skin findings: no collateral vein dilation and no ostomy  There was moderate tenderness in the epigastric area, but not in the suprapubic area, not in the right upper quadrant, not in the left upper quadrant and not in the left lower quadrant  The abdomen was not firm and not rigid  No rebound tenderness  No guarding  There was a negative Oscar's sign and negative psoas sign  no masses palpated  TENDER EPIGASTRIC AREA; NO CVA TENDERNESS NO REBOUND, SOME TENDERNESS, NORMAL BOWEL SOUNDS;    Liver and spleen: No hepatomegaly or splenomegaly  Lymphatic   Palpation of lymph nodes in neck: No lymphadenopathy      Musculoskeletal   Gait and station: Normal     Digits and nails: Normal without clubbing or cyanosis  Inspection/palpation of joints, bones, and muscles: Normal     Skin   Skin and subcutaneous tissue: Normal without rashes or lesions  Neurologic   Cranial nerves: Cranial nerves 2-12 intact  Reflexes: 2+ and symmetric  Sensation: No sensory loss  Psychiatric   Orientation to person, place and time: Normal     Mood and affect: Normal     Diabetic Foot Screen: Normal        Health Management  History of Diabetes mellitus   (1) HEMOGLOBIN A1C; every 3 months; Last 17TZX7497; Next Due: 13ACG8532; Overdue  (1) MICROALBUMIN, 24HR URINE; every 1 year; Next Due: 01UOQ0026; Overdue  *VB - Foot Exam; every 1 year; Next Due: 12KEX6957; Overdue  Health Maintenance   COLONOSCOPY; every 10 years; Last 69TVQ9587; Next Due: 42NHT9571; Active    Signatures   Electronically signed by : Shaylee Triana, ; Feb 17 2017  4:07PM EST                       (Author)    Electronically signed by : Jayla Thornton DO; Feb 22 2017 11:05AM EST                       (Author)    Electronically signed by :  Jayla Thornton DO; Feb 22 2017 12:34PM EST                       (Author)

## 2018-01-23 NOTE — PROGRESS NOTES
Assessment    1  Diabetes type 2, uncontrolled (250 02) (E11 65)   2  Peripheral vascular disease (443 9) (I73 9)   3  Benign essential hypertension (401 1) (I10)   4  Medicare annual wellness visit, subsequent (V70 0) (Z00 00)   5  Acquired polyneuropathy (356 9) (G62 9)   6  Anxiety (300 00) (F41 9)   7   Arteriosclerotic cardiovascular disease (429 2,440 9) (I25 10)    Plan  Anemia, Arteriosclerotic cardiovascular disease, Benign essential hypertension,  Diabetes type 2, uncontrolled, Hyperlipidemia    · Isosorbide Mononitrate ER 60 MG Oral Tablet Extended Release 24 Hour; TAKE  1 TABLET ONCE DAILY  Aneurysm of infrarenal abdominal aorta, Arteriosclerotic cardiovascular disease, Benign  essential hypertension, Diabetes type 2, uncontrolled    · Valsartan 320 MG Oral Tablet; TAKE 1 TABLET DAILY  Aneurysm of infrarenal abdominal aorta, Arteriosclerotic cardiovascular disease, Benign  essential hypertension, Diabetes type 2, uncontrolled, Hyperlipidemia, Thoracic  ascending aortic aneurysm    · Atorvastatin Calcium 40 MG Oral Tablet; take 1 tablet by mouth every evening  Anxiety, PMH: Mental status change    · Citalopram Hydrobromide 20 MG Oral Tablet; 1 TAB MID DAY-DOSAGE  CHANGE  Benign essential hypertension    · Lisinopril 20 MG Oral Tablet  GERD (gastroesophageal reflux disease)    · Pantoprazole Sodium 40 MG Oral Tablet Delayed Release (Protonix); take 1  tablet by mouth once daily  PMH: Bilateral impacted cerumen    · Clopidogrel Bisulfate 75 MG Oral Tablet; take one tablet by mouth one time daily    Discussion/Summary    HTN- CHANGE LISINOPRIL 20 BID TO VALSARTAN 320 ONCE A DAY TO HELP WITH BETTER BP CONTROL AND COMPLIANCE  DM2- STABLE- [facility] AND GET ME A COPY  CAD- STABLE- SOME SOB ON EXERTION- NO CHANGE AND WOKR UP EQUIVOCAL SO FAR; ON BETA BLOCKER AND ISOSORBIDE AS WELL AS ARB; ON SATAIN, PLAVIX AND ASA   GASTRITIS- ON PLAVIX- IMPROVED WITH PANTOPRAZOLE  ANXIETY- STABLE  TREMOR- WAS BETTER IWTH HIGHER DOSE OF BZD - MAY NEED TO CHANGE MEDS WHEN WEANED OFF  FOLLOW UP 4 MONTHS  IMM UP TO DATE  [facility]  Impression: Subsequent Annual Wellness Visit, with preventive exam as well as age and risk appropriate counseling completed  Cardiovascular screening and counseling: Dx - V81 2 Screen for CV Disorder  Diabetes screening and counseling: screening is current and Dx - V77 1 Screen for DM  Colorectal cancer screening and counseling: Dx - V76 51 Screen for CRC  Prostate cancer screening and counseling: PER [facility] and Dx - V76 44 Screen PSA  Osteoporosis screening and counseling: SEES CARDIOLOGY  Abdominal aortic aneurysm screening and counseling: Dx - V81 2 Screen for CV Disorder  Glaucoma screening and counseling: Dx - V80 1 Screen for Glaucoma  HIV screening and counseling: screening not indicated  Immunizations: influenza vaccine is up to date this year, influenza vaccination is recommended annually, the lifetime pneumococcal vaccine has been completed, pneumococcal vaccine due today, hepatitis B vaccination series is not indicated at this time due to the patient's low risk of christian the disease and Zostavax vaccination up to date  Advance Directive Planning: complete and up to date, he was encouraged to follow-up with me to discuss his questions and/or decisions  Patient Discussion: plan discussed with the patient, follow-up visit needed in one year  Chief Complaint  PATIENT HERE FOR FOLLOW UP  HE HAS HAD MULTIPLE MED CHANGES NOTED IN CHART  HE IS ACHEY IN THE AM- GETS BETTER IN 20 MINUTES;   [facility]   Patient is here today for follow up of chronic conditions described in HPI  History of Present Illness  HPI: PT IS TIRED, ACHEY IN THE AM; HE FELT OFF BALANCE THIS AM- HE HAS HX OF NEUROPATHY; HE IS MORE TIRED; Peripheral Vascular Disease (Brief):  The patient is being seen for a routine clinic follow-up of and TREMOR LEFT UPPER EXT; DECREASED SENSATION LOWER EXT peripheral vascular disease  Symptoms:  gait abnormality, but no exertional leg pain, no resting leg pain and no leg cramps  The patient is currently experiencing symptoms  No associated symptoms are reported  Anxiety Disorder (Follow-Up): The patient is being seen for follow-up of anxiety and HIS ANXIETY IS WORSE; HIS TREMOR OF LEFT UPPER EXT IS WORSE;  The patient reports no change in the condition  He has had no significant interval events  Interval symptoms:  stable anxiety, denies difficulty concentrating, denies restlessness, denies panic attacks and stable sleep disruption  Associated symptoms: no grandiosity, no racing thoughts, no periods of euphoria, no hallucinations and no suicidal ideation  Medications:  the patient is adherent to his medication regimen, but he denies medication side effects  Hypertension (Follow-Up): The patient presents for follow-up of essential hypertension and BP NOT WELL CONTROLLED ON LISINOPRIL BID- WILL CHANGE TO VALSARTAN 320 MG  The patient states he has been stable with his blood pressure control since the last visit  Symptoms:   Diabetes Type II (Follow-Up): The patient states he has been stable with his Type II Diabetes control since the last visit ON METFORMIN 1000 MG DAILY  Comorbid Illnesses: hypertension and hyperlipidemia  Complications: no peripheral neuropathy, no gastroparesis, no nephropathy and no coronary artery disease  He has no known diabetic complications  Symptoms: stable numbness of the feet, denies a foot ulcer, denies foot pain and denies vomiting  Associated symptoms include no polyuria  Home monitoring: Glycemic control has been good  the patient reports no symptomatic hypoglycemic episodes  Medications: the patient is adherent with his medication regimen  Depression Screening and Treatment Clinical Pathway: The patient is being seen for ANXIETY - STABLE - WEANING OFF OF BZD depression   The patient is currently experiencing symptoms  depressed mood no fatigue no sense of failure no poor concentration no indecisiveness no psychomotor retardation no appetite change no weight loss no weight gain no insomnia                        Patient Care Team    Care Team Member Role Specialty Office Number   Alfredo Contreras MD Specialist Gastroenterology Adult [phone]   Gómez Fajardo MD   [phone]     Review of Systems    Constitutional: No fever or chills, feels well, no tiredness, no recent weight gain or weight loss, as noted in HPI, not feeling poorly and not feeling tired  Eyes: eyesight problems and WEARS GLASSES, but No complaints of eye pain, no red eyes, no discharge from eyes, no itchy eyes  ENT: HEARING AIDS B/L, but no complaints of earache, no hearing loss, no nosebleeds, no nasal discharge, no sore throat, no hoarseness, no earache, no nosebleeds, no hearing loss and no nasal discharge  Cardiovascular: No complaints of slow heart rate, no fast heart rate, no chest pain, no palpitations, no leg claudication, no lower extremity, as noted in HPI, no chest pain, no intermittent leg claudication, no palpitations and no extremity edema  Respiratory: as noted in HPI, no shortness of breath, no cough, no wheezing and no shortness of breath during exertion  Gastrointestinal: No complaints of abdominal pain, no constipation, no nausea or vomiting, no diarrhea or bloody stools, as noted in HPI, no nausea and no diarrhea    The patient presents with complaints of sudden onset of intermittent episodes of moderate epigastric no abdominal pain, described as sharp, radiating to the epigastric area  On a scale of 1 to 10, the patient rates the pain as 7  Symptoms are not improved by antiemetics, antacids, bismuth subsalicylate, h2 blockers and PPIs  Symptoms are not made worse by eating, drinking, fatty foods, dairy foods and alcohol  Symptoms are unchanged     Genitourinary: No complaints of dysuria, no incontinence, no hesitancy, no nocturia, no genital lesion, no testicular pain, no urinary hesitancy and no nocturia  Musculoskeletal: No complaints of arthralgia, no myalgias, no joint swelling or stiffness, no limb pain or swelling, as noted in HPI, no arthralgias, no joint swelling and no joint stiffness  Integumentary: No complaints of skin rash or skin lesions, no itching, no skin wound, no dry skin, no rashes, no itching, no skin lesions and no skin wound  Neurological: No compliants of headache, no confusion, no convulsions, no numbness or tingling, no dizziness or fainting, no limb weakness, no difficulty walking, no headache, no numbness, no confusion, no dizziness and no convulsions  Psychiatric: Is not suicidal, no sleep disturbances, no anxiety or depression, no change in personality, no emotional problems, no anxiety and no depression  Endocrine: No complaints of proptosis, no hot flashes, no muscle weakness, no erectile dysfunction, no deepening of the voice, no feelings of weakness, no proptosis, no muscle weakness and no hot flashes  Hematologic/Lymphatic: No complaints of swollen glands, no swollen glands in the neck, does not bleed easily, no easy bruising, no swollen glands and no swollen glands in the neck  ROS reviewed  Active Problems    1  Acquired polyneuropathy (356 9) (G62 9)   2  Anemia (285 9) (D64 9)   3  Aneurysm of infrarenal abdominal aorta (441 4) (I71 4)   4  Anxiety (300 00) (F41 9)   5  Arteriosclerotic cardiovascular disease (429 2,440 9) (I25 10)   6  Benign essential hypertension (401 1) (I10)   7  Bilateral hearing loss, unspecified hearing loss type (389 9) (H91 93)   8  Cervical disc disorder with radiculopathy of mid-cervical region (723 4) (M50 120)   9  Cervical radicular pain (723 4) (M54 12)   10  Confusion (298 9) (R41 0)   11  Diabetes type 2, uncontrolled (250 02) (E11 65)   12  Dizziness (780 4) (R42)   13  Essential tremor (333 1) (G25 0)   14  Frequent falls (V15 88) (R29 6)   15  Gait disturbance (781 2) (R26 9)   16  GERD (gastroesophageal reflux disease) (530 81) (K21 9)   17  Hyperlipidemia (272 4) (E78 5)   18  Medicare annual wellness visit, subsequent (V70 0) (Z00 00)   19  Murmur (785 2) (R01 1)   20  Myofascial muscle pain (729 1) (M79 1)   21  Onychomycosis (110 1) (B35 1)   22  Peripheral vascular disease (443 9) (I73 9)   23  Pharyngoesophageal dysphagia (787 24) (R13 14)   24  Sensorineural hearing loss, bilateral (389 18) (H90 3)   25  Sleep disturbances (780 50) (G47 9)   26  Thoracic ascending aortic aneurysm (441 2) (I71 2)   27  Trapezius muscle spasm (728 85) (O04 053)    Past Medical History    · History of Abdominal pain, acute, epigastric (299 18,885 50) (R10 13)   · History of Angiographic Coronary Findings   · History of Bilateral impacted cerumen (380 4) (H61 23)   · History of influenza vaccination (V49 89) (Z92 29)   · History of shortness of breath (V13 89) (X00 740)   · History of Medicare annual wellness visit, subsequent (V70 0) (Z00 00)   · History of Thoracic ascending aortic aneurysm (441 2) (I71 2)   · History of Transition of care   · History of Transition of care    The active problems and past medical history were reviewed and updated today  Surgical History    · History of Hernia Repair   · History of Surgery For Popliteal Artery Aneurysm    The surgical history was reviewed and updated today  Family History    The family history was reviewed and updated today  Social History    · Former smoker (M97 87) (I00 268)   · Quit 1972  Smoked for about 10 years two to three ppd   · Stopped Drinking Alcohol  The social history was reviewed and updated today  The social history was reviewed and is unchanged  Current Meds   1  Accu-Chek Barbara Plus In Vitro Strip; TEST TWICE DAILY; Therapy: 07Apr2017 to (Evaluate:36Ycw9535); Last Rx:07Apr2017 Ordered   2   Accu-Chek Barbara Plus w/Device Kit; USE AS DIRECTED; Therapy: 05JGF3243 to (Last Rx:07Apr2017) Ordered   3  ALPRAZolam 0 5 MG Oral Tablet; take 0 5 tablet bedtime; Therapy: (Recorded:72Yvk8743) to Recorded   4  Aspirin 81 MG TABS; TAKE 1 TABLET DAILY; Therapy: 35LGW4328 to Recorded   5  Atorvastatin Calcium 40 MG Oral Tablet; take 1 tablet by mouth every evening    Requested for: 97Hiw4342; Last Rx:67Jfu5665 Ordered   6  Citalopram Hydrobromide 20 MG Oral Tablet; 1 TAB MID DAY-DOSAGE CHANGE;   Therapy: 64QMA3027 to (Evaluate:17Nov2018)  Requested for: 62DCA0195; Last   Rx:24Nov2017 Ordered   7  CoQ10 CAPS; Therapy: (Recorded:18Jan2016) to Recorded   8  Isosorbide Mononitrate ER 60 MG Oral Tablet Extended Release 24 Hour; TAKE 1   TABLET ONCE DAILY; Therapy: 07PLC3450 to Recorded   9  Lisinopril 20 MG Oral Tablet; TAKE 1 TABLET TWICE DAILY  Requested for: 94YBW2548;   Last Rx:24Nov2017 Ordered   10  MetFORMIN HCl - 1000 MG Oral Tablet; TAKE 0 5 TABLET Daily at dinner time; Therapy: (Recorded:01Ndc8546) to Recorded   11  Metoprolol Tartrate 25 MG Oral Tablet; TAKES HALF A TABLET TWICE A DAY; Therapy: 20LTZ4471 to (Pamella Jamison) Recorded   12  Pantoprazole Sodium 40 MG Oral Tablet Delayed Release; take 1 tablet by mouth once    daily; Therapy: 04HAF2952 to (Nika Harman)  Requested for: 95Ejo4653 Recorded   13  Vitamin B12 TABS; Therapy: (Recorded:18Jan2016) to Recorded    The medication list was reviewed and updated today  Allergies    1   No Known Drug Allergies    Immunizations   1 2 3 4    Influenza  17-Oct-2014  (69y) 09-Oct-2015  (70y) 28-Nov-2016  (71y) 06-Oct-2017  (72y)    PCV  28-May-2015  (69y)       PPSV  18-TYH-8885  (71y)       Zoster  09-Oct-2015  (70y)        Vitals  Signs    Temperature: 96 7 F  Heart Rate: 68  Respiration: 18  Systolic: 762  Diastolic: 80  Height: 5 ft 7 in  Weight: 203 lb   BMI Calculated: 31 79  BSA Calculated: 2 03    Physical Exam    Constitutional   General appearance: No acute distress, well appearing and well nourished  WDWN MALE IN NAD  Eyes   Conjunctiva and lids: No swelling, erythema, or discharge  Pupils and irises: Equal, round and reactive to light  Ears, Nose, Mouth, and Throat   External inspection of ears and nose: Normal     Otoscopic examination: Tympanic membrance translucent with normal light reflex  Canals patent without erythema  DECREAED HEARING B/L WITH SCREENING TEST DONE IN OFFICE  Nasal mucosa, septum, and turbinates: Normal without edema or erythema  The nasal cavity was examined using a speculum  no nasal discharge, normal nasal mucosa, normal nasal septum, no intranasal masses or polyps and normal nasal turbinates  Oropharynx: Normal with no erythema, edema, exudate or lesions  Pulmonary   Respiratory effort: No increased work of breathing or signs of respiratory distress  Auscultation of lungs: Abnormal   Auscultation of the lungs revealed no expiratory wheezing, normal expiratory time and no inspiratory wheezing  no rales or crackles were heard bilaterally  no rhonchi  no friction rub  no wheezing  diminished breath sounds over both bases  no bronchial breath sounds  Cardiovascular   Palpation of heart: Normal PMI, no thrills  Auscultation of heart: Normal rate and rhythm, normal S1 and S2, without murmurs  NO S3 NOTED  Examination of extremities for edema and/or varicosities: Normal     Carotid pulses: Normal     Abdomen   Abdomen: Non-tender, no masses  Liver and spleen: No hepatomegaly or splenomegaly  NO CVA TENDERNESS  Lymphatic   Palpation of lymph nodes in neck: No lymphadenopathy  Musculoskeletal   Gait and station: Normal     Digits and nails: Normal without clubbing or cyanosis  Inspection/palpation of joints, bones, and muscles: Normal     Skin   Skin and subcutaneous tissue: Normal without rashes or lesions  Neurologic   Cranial nerves: Cranial nerves 2-12 intact  Reflexes: 2+ and symmetric  Sensation: No sensory loss  Psychiatric   Orientation to person, place and time: Normal     Mood and affect: Normal     Diabetic Foot Screen: Normal        Health Management  History of Diabetes mellitus   (1) HEMOGLOBIN A1C; every 3 months; Last 01Aug2017; Next Due: 33QZC6543; Overdue  (1) MICROALBUMIN, 24HR URINE; every 1 year; Next Due: 17SRG5311; Overdue  *VB - Foot Exam; every 1 year; Last 01Aug2017; Next Due: 91Nmn7853; Active  Health Maintenance   COLONOSCOPY; every 10 years; Last 48XJY3933; Next Due: 25MVD8921; Active    Future Appointments    Date/Time Provider Specialty Site   04/09/2018 10:00 AM Davida Velez, DO Internal Medicine [address] [facility],[address]   02/19/2018 05:30 PM Carly Tran MD Neurology [facility] 21     Signatures   Electronically signed by :  Jayla Thornton [facility]; Dec  4 2017 12:50PM EST                       (Author)

## 2018-01-23 NOTE — PROGRESS NOTES
Chief Complaint  PATIENT WAS SEEN FOR BLOOD PRESSURE CHECK  AND TO CHECK HIS MONITOR FOR ACCURACY  PATIENT BP /78 WITH OUR CUFF  126/77 & 121/66 WITH HIS MONITORS  (HE BROUGHT 2) PATIENT AND HIS DAUGHTER ARE CONCERNED BECAUSE WHEN HE WAKES UP IN THE MORNING BEFORE HE TAKES HIS VALSARTAN, HIS BP IS ELEVATED  TODAY IT /107  YESTERDAY IT /97  AFTER SPEAKING WITH DR Richie Oliveira, I INFORMED PATIENT TO TAKE THE VALSARTAN 1/2 TWICE DAILY  AND TO CONTINUE TO MONITOR HIS BLOOD PRESSURES PERIODICALLY AT HOME  PATIENT UNDERSTOOD INSTRUCTIONS  Active Problems    1  Acquired polyneuropathy (356 9) (G62 9)   2  Anemia (285 9) (D64 9)   3  Aneurysm of infrarenal abdominal aorta (441 4) (I71 4)   4  Anxiety (300 00) (F41 9)   5  Arteriosclerotic cardiovascular disease (429 2,440 9) (I25 10)   6  Benign essential hypertension (401 1) (I10)   7  Bilateral hearing loss, unspecified hearing loss type (389 9) (H91 93)   8  Cervical disc disorder with radiculopathy of mid-cervical region (723 4) (M50 120)   9  Cervical radicular pain (723 4) (M54 12)   10  Confusion (298 9) (R41 0)   11  Diabetes type 2, uncontrolled (250 02) (E11 65)   12  Dizziness (780 4) (R42)   13  Essential tremor (333 1) (G25 0)   14  Frequent falls (V15 88) (R29 6)   15  Gait disturbance (781 2) (R26 9)   16  GERD (gastroesophageal reflux disease) (530 81) (K21 9)   17  Hyperlipidemia (272 4) (E78 5)   18  Medicare annual wellness visit, subsequent (V70 0) (Z00 00)   19  Murmur (785 2) (R01 1)   20  Myofascial muscle pain (729 1) (M79 1)   21  Onychomycosis (110 1) (B35 1)   22  Peripheral vascular disease (443 9) (I73 9)   23  Pharyngoesophageal dysphagia (787 24) (R13 14)   24  Sensorineural hearing loss, bilateral (389 18) (H90 3)   25  Sleep disturbances (780 50) (G47 9)   26  Thoracic ascending aortic aneurysm (441 2) (I71 2)   27  Trapezius muscle spasm (728 85) (R87 211)    Current Meds   1   Accu-Chek Barbara Plus In Vitro Strip; TEST TWICE DAILY; Therapy: 07Apr2017 to (Evaluate:90Awn2736); Last Rx:07Apr2017 Ordered   2  Accu-Chek Barbara Plus w/Device Kit; USE AS DIRECTED; Therapy: 15EQS7665 to (Last Rx:07Apr2017) Ordered   3  ALPRAZolam 0 5 MG Oral Tablet; take 0 5 tablet bedtime; Therapy: (Recorded:17Kam1994) to Recorded   4  Aspirin 81 MG TABS; TAKE 1 TABLET DAILY; Therapy: 24WSU4036 to Recorded   5  Atorvastatin Calcium 40 MG Oral Tablet; take 1 tablet by mouth every evening    Requested for: 80QQB6025; Last Rx:69Zda7053 Ordered   6  Citalopram Hydrobromide 20 MG Oral Tablet; 1 TAB MID DAY-DOSAGE CHANGE;   Therapy: 86VBT1108 to (Jens Burkitt)  Requested for: 47DQY3061; Last   Rx:76Evh2005 Ordered   7  Clopidogrel Bisulfate 75 MG Oral Tablet; take one tablet by mouth one time daily    Requested for: 61VUD6117; Last Rx:36Kgs1024 Ordered   8  CoQ10 CAPS; Therapy: (Recorded:18Jan2016) to Recorded   9  Isosorbide Mononitrate ER 60 MG Oral Tablet Extended Release 24 Hour; TAKE 1   TABLET ONCE DAILY; Therapy: 40SSL5326 to (Jens Burkitt)  Requested for: 57VPV2992; Last   Rx:68Hav0912 Ordered   10  MetFORMIN HCl - 1000 MG Oral Tablet; TAKE 0 5 TABLET Daily at dinner time; Therapy: (Recorded:19Cks8966) to Recorded   11  Metoprolol Tartrate 25 MG Oral Tablet; TAKES HALF A TABLET TWICE A DAY; Therapy: 02BSI2748 to (Galilea Tee) Recorded   12  Pantoprazole Sodium 40 MG Oral Tablet Delayed Release; take 1 tablet by mouth once    daily; Therapy: 54BKX2333 to (Jens Burkitt)  Requested for: 46PVZ3228; Last    Rx:53Nyn2142 Ordered   13  Valsartan 320 MG Oral Tablet; TAKE 1 TABLET DAILY; Therapy: 15QVC3644 to (Evaluate:02Jun2018)  Requested for: 31LKU4024; Last    Rx:82Rtl5555 Ordered   14  Vitamin B12 TABS; Therapy: (Recorded:18Jan2016) to Recorded    Allergies    1   No Known Drug Allergies    Vitals  Signs    Temperature: 30 0 F  Systolic: 546, LUE, Sitting  Diastolic: 78, LUE, Sitting  BP Comments: 126/77 pt machine (1st machine) & 121/66 (2nd machine)  Height: 5 ft 7 in  Weight: 203 lb   BMI Calculated: 31 79  BSA Calculated: 2 03    Future Appointments    Date/Time Provider Specialty Site   04/09/2018 10:00 AM Teresa Velez, DO Internal Medicine [address]   02/19/2018 05:30 PM Trinidad Dancer, MD Neurology [facility] 21     Signatures   Electronically signed by : Sander Bianchi, ; Dec 28 2017  4:08PM EST                       (Author)    Electronically signed by :  Jayla Thornton [facility]; Dec 28 2017  4:28PM EST                       (Author)

## 2018-01-24 NOTE — RESULT NOTES
Verified Results  (1) CBC/PLT/DIFF [id number] 10:29AM Darline Tolbert    Order Number: [id number]     Test Name Result Flag Reference   WBC COUNT 5 00 Thousand/uL  4 31-10 16   RBC COUNT 4 19 Million/uL  3 88-5 62   HEMOGLOBIN 13 8 g/dL  12 0-17 0   HEMATOCRIT 42 0 %  36 5-49 3    fL H 82-98   MCH 32 9 pg  26 8-34 3   MCHC 32 9 g/dL  31 4-37 4   RDW 13 4 %  11 6-15 1   MPV 12 1 fL  8 9-12 7   PLATELET COUNT 708 Thousands/uL  149-390   nRBC AUTOMATED 0 /100 WBCs     NEUTROPHILS RELATIVE PERCENT 57 %  43-75   LYMPHOCYTES RELATIVE PERCENT 29 %  14-44   MONOCYTES RELATIVE PERCENT 9 %  4-12   EOSINOPHILS RELATIVE PERCENT 5 %  0-6   BASOPHILS RELATIVE PERCENT 0 %  0-1   NEUTROPHILS ABSOLUTE COUNT 2 86 Thousands/? ??L  1 85-7 62   LYMPHOCYTES ABSOLUTE COUNT 1 44 Thousands/? ??L  0 60-4 47   MONOCYTES ABSOLUTE COUNT 0 43 Thousand/? ??L  0 17-1 22   EOSINOPHILS ABSOLUTE COUNT 0 24 Thousand/? ??L  0 00-0 61   BASOPHILS ABSOLUTE COUNT 0 02 Thousands/? ??L  0 00-0 10     (1) COMPREHENSIVE METABOLIC PANEL [id number] 09:86GJ Claude Emperor Order Number: [id number]     Test Name Result Flag Reference   SODIUM 140 mmol/L  136-145   POTASSIUM 3 8 mmol/L  3 5-5 3   CHLORIDE 105 mmol/L  100-108   CARBON DIOXIDE 31 mmol/L  21-32   ANION GAP (CALC) 4 mmol/L  4-13   BLOOD UREA NITROGEN 14 mg/dL  5-25   CREATININE 0 82 mg/dL  0 60-1 30   Standardized to IDMS reference method   CALCIUM 9 2 mg/dL  8 3-10 1   BILI, TOTAL 0 82 mg/dL  0 20-1 00   ALK PHOSPHATAS 70 U/L     ALT (SGPT) 24 U/L  12-78   Specimen collection should occur prior to Sulfasalazine and/or Sulfapyridine administration due to the potential for falsely depressed results  AST(SGOT) 22 U/L  5-45   Specimen collection should occur prior to Sulfasalazine administration due to the potential for falsely depressed results     ALBUMIN 3 7 g/dL  3 5-5 0   TOTAL PROTEIN 7 6 g/dL  6 4-8 2   eGFR 88 ml/min/1 73sq huey     [employer] Disease Education Program recommendations are as follows:  GFR calculation is accurate only with a steady state creatinine  Chronic Kidney disease less than 60 ml/min/1 73 sq  meters  Kidney failure less than 15 ml/min/1 73 sq  meters  GLUCOSE FASTING 114 mg/dL H 65-99   Specimen collection should occur prior to Sulfasalazine administration due to the potential for falsely depressed results  Specimen collection should occur prior to Sulfapyridine administration due to the potential for falsely elevated results  (1) HEMOGLOBIN A1C [id number] 10:29AM Blayne Ho Order Number: [id number]     Test Name Result Flag Reference   HEMOGLOBIN A1C 6 6 % H 4 2-6 3   EST  AVG  GLUCOSE 143 mg/dl       (1) LIPID PANEL, FASTING [id number] 10:29AM Maxim OM Latamzoe   [employer] Order Number: [id number]     Test Name Result Flag Reference   CHOLESTEROL 100 mg/dL     Cholesterol:    Desirable <200 mg/dl    Borderline 200-239 mg/dl    High>239   HDL,DIRECT 54 mg/dL  40-60   HDL Cholesterol:    High>59 mg/dL    Low <41 mg/dL   LDL CHOLESTEROL CALCULATED 26 mg/dL  0-100   This screening LDL is a calculated result  It does not have the accuracy of the Direct Measured LDL in the monitoring of patients with hyperlipidemia and/or statin therapy  Direct Measure LDL (NUT344) must be ordered separately in these patients  Triglyceride:        Normal <150 mg/dl   Borderline High 150-199 mg/dl   High 200-499 mg/dl   Very High >499 mg/dl   TRIGLYCERIDES 100 mg/dL  <=150   Specimen collection should occur prior to N-Acetylcysteine or Metamizole administration due to the potential for falsely depressed results  (1) TSH [id number] 10:29AM Maxim OM Latamzoe   [employer] Order Number: [id number]     Test Name Result Flag Reference   TSH 1 540 uIU/mL  0 358-3 740   Patients undergoing fluorescein dye angiography may retain small amounts of fluorescein in the body for 48-72 hours post procedure  Samples containing fluorescein can produce falsely depressed TSH values  If the patient had this procedure,a specimen should be resubmitted post fluorescein clearance  (1) MICROALBUMIN CREATININE RATIO, RANDOM URINE [id number] 10:29AM [facility] Number: [id number]     Test Name Result Flag Reference   MICROALBUMIN/ CREAT R 18 mg/g creatinine  0-30   MICROALBUMIN,URINE 14 7 mg/L  0 0-20 0   CREATININE URINE 80 5 mg/dL       (1) VALPROIC ACID (DEPAKOTE) [id number] 10:29AM [facility] Order Number: [id number]     Test Name Result Flag Reference   VALPROIC ACID <3 ug/mL L        Plan  PMH: Diabetes mellitus    · (1) HEMOGLOBIN A1C ; every 3 months; Last [id number];  Next [id number];  Status:Active

## 2018-02-20 ENCOUNTER — OFFICE VISIT (OUTPATIENT)
Dept: NEUROLOGY | Facility: CLINIC | Age: 73
End: 2018-02-20
Payer: MEDICARE

## 2018-02-20 VITALS
SYSTOLIC BLOOD PRESSURE: 130 MMHG | BODY MASS INDEX: 30.61 KG/M2 | WEIGHT: 195 LBS | HEIGHT: 67 IN | DIASTOLIC BLOOD PRESSURE: 74 MMHG

## 2018-02-20 DIAGNOSIS — R25.9 MIXED ACTION AND RESTING TREMOR: Primary | ICD-10-CM

## 2018-02-20 DIAGNOSIS — G31.84 MILD COGNITIVE IMPAIRMENT WITH MEMORY LOSS: ICD-10-CM

## 2018-02-20 PROCEDURE — 99214 OFFICE O/P EST MOD 30 MIN: CPT | Performed by: PHYSICIAN ASSISTANT

## 2018-02-20 RX ORDER — TOPIRAMATE 25 MG/1
TABLET ORAL
Qty: 60 TABLET | Refills: 3 | Status: SHIPPED | OUTPATIENT
Start: 2018-02-20 | End: 2018-06-11 | Stop reason: SDUPTHER

## 2018-02-20 RX ORDER — VALSARTAN 320 MG/1
0.5 TABLET ORAL 2 TIMES DAILY
COMMUNITY
Start: 2017-12-04 | End: 2018-05-29 | Stop reason: SDUPTHER

## 2018-02-20 RX ORDER — BUSPIRONE HYDROCHLORIDE 5 MG/1
2.5 TABLET ORAL 2 TIMES DAILY
COMMUNITY
End: 2018-04-13 | Stop reason: SDUPTHER

## 2018-02-20 NOTE — PATIENT INSTRUCTIONS
Continues to have bothersome mixed tremors in the hands worse on the left  He notices it more when he gets stressed or anxious  He feels that his anxiety is still not completely controlled although his daughter feels that he is doing well  He does not have rigidity or clear bradykinesia on exam however he does have some postural instability  He was on low dose Sinemet in the past with no clear improvement  Discussed the differential diagnosis of parkinonsism versus atypical Et  He had failed trial of Neurontin due to side effects  He remains on Metoprolol for HTN  He is on Plavix so will avoid Primidone  Although he does already have some underlying MCI he would still like to try a low dose of topiramate  He will take 1tab qhs x 1 week then 1tab twice a day  If he has any side effects he will stop  May consider another trial of Sinemet in the future if no improvement with topiramate  He continues with therapy from a cardiac standpoint

## 2018-02-20 NOTE — ASSESSMENT & PLAN NOTE
Patient with underlying anxiety and depression  He follows with Geriatrics and they continue to monitor his medications    He takes Buspar and Citalopram

## 2018-02-20 NOTE — ASSESSMENT & PLAN NOTE
He follows with Geriatrics  He was found to be improved at his last visit  They felt he was safe to drive locally and he continues to do this without any issues

## 2018-02-20 NOTE — PROGRESS NOTES
Patient ID: Mark Anthony Gandhi is a 67 y o  male  Assessment/Plan:    Mixed action and resting tremor  Continues to have bothersome mixed tremors in the hands worse on the left  He notices it more when he gets stressed or anxious  He feels that his anxiety is still not completely controlled although his daughter feels that he is doing well  He does not have rigidity or clear bradykinesia on exam however he does have some postural instability  He was on low dose Sinemet in the past with no clear improvement  Discussed the differential diagnosis of parkinonsism versus atypical Et  He had failed trial of Neurontin due to side effects  He remains on Metoprolol for HTN  He is on Plavix so will avoid Primidone  Although he does already have some underlying MCI he would still like to try a low dose of topiramate  He will take 1tab qhs x 1 week then 1tab twice a day  If he has any side effects he will stop  May consider another trial of Sinemet in the future if no improvement with topiramate  He continues with therapy from a cardiac standpoint  Mild cognitive impairment with memory loss  He follows with Geriatrics  He was found to be improved at his last visit  They felt he was safe to drive locally and he continues to do this without any issues  Depression  Patient with underlying anxiety and depression  He follows with Geriatrics and they continue to monitor his medications  He takes Buspar and Citalopram             Subjective:    Katharine Pedersen is a 67year old left handed male with 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, moderate coronary artery disease s/p cath 2012, hypertension, diabetes, hyperlipedimeia, GERD and tremors who present for follow up  To review, he presented to the ED on 5/17/17 with complaints of persistent chest pain and SOB  He was also noted to have left sided facial droop by his family a few days prior however this was improving   He was still having some speech difficulties per the family and some left sided weakness with movements  His troponins were negative, ECHO LVEF 60%, with left ventricle concentric remodeling, mild TR and moderate AS  Stress test normal  Chest pain felt to be GI related  Stroke workup was normal  Brain MRI revealed no acute ischemic changes, MRA/MRV was negative for acute abnormalities, dominant right vertebral artery with hypoplastic left vertebral artery, Carotid doppler revealed less than 50% stenosis bilateral ICA and MRI carotids revealed no occlusive disease  His left sided weakness and facial droop resolved in the hospital  Neurology was consulted and felt that this may have been a TIA  His stroke preventions were maximized  It was recommended he remain on ASA and Plavix daily  He remains on Metoprolol and lisinopril for BP control  Atorvastatin was asked to be increased to 40mg daily for cholesterol  He was noted to have evidence of neuropathy on exam  It was recommended that he have B12, TSH and SPEP checked along with EMG as outpatient  EMG was performed and revealed mild polyneuropathy  He did not fit the clinical picture for PD however he was to remain on his home dose of Sinemet  He had an EGD performed with GI and it was felt that his dysphagia was related to muscular weakness  Swallow study revealed oral and pharyngeal dysphagia with no aspiration  He quit drinking in [place] [address] [facility] and smoking in 1973  After review of his PCP notes it appears he began complaining of left sided hand tremor in 7/2016  At that time it was noted more when eating cereal in the AM  He was started on a trial of Sinemet 1tab qam  Per the notes there was no clear improvement with this medication and it was felt that the tremors were likely more related to anxiety  His Citalopram dose has been increased in the past and discussed stopping the Sinemet  No family history of tremors or PD    He had side effects with Neurontin in the past   He had no worsening of symptoms after stopping Sinemet  At his last visit he was noted to have some improvement of his tremors with low dose Xanax prescribed by his PCP  He did not wish to start any new medication for the tremors  His daughter requested a referral to the sleep center  He was diagnosed with sleep apnea and now has a CPAP  He was also seen by Geriatrics since the last visit  His daughter has weaned him off Xanax with no clear worsening of tremor  Geriatrics was concerned with how his daughter was acting in the office and they [facility]  They felt given he had overall improvement of confusion he was safe to drive locally during the day  Recommended increasing his Buspar to 5mg bid for his anxiety however he remains on 2 5mg bid  He feels that he is still having issues with his anxiety  He will get very anxious when things are not going his way he will stop being able to function well and starts looking to his daughter for his answers  He knows that when his grandson killed himself this made everything worse  On a day to day basis his tremor is there slightly but is not very bothersome  The tremor is worse on the left however he does notice it on the right at times as well  The tremor is worse when he is anxious or stressed  He is able to eat and drink for the most part however there are times when the tremor causing food to fall off the fork  He resides with his daughter, grandson and his girlfriend  He is able to perform his ADLs well  He dresses, shower and shaves on his own  He will occasionally have trouble with jackets  Some nights are better than other for sleep  He does use the CPAP regularly however he feels that it does not always work properly  He is walking well and denies any falls  He has tripped a few times on things on the floor  He still drives locally and denies any issues with this    He has blister packs from the pharmacy which helps  In the past he he was getting confused with his medications  Total time spent today 35 minutes  Greater than 50% of total time was spent with the patient and / or family counseling and / or coordination of care    Objective:    Blood pressure 130/74, height 5' 7" (1 702 m), weight 88 5 kg (195 lb)  Physical Exam   Constitutional: He appears well-developed and well-nourished  Eyes: EOM are normal  Pupils are equal, round, and reactive to light  Neurological Exam    Mental Status  The patient is alert  He has normal attention span and concentration  Cranial Nerves    CN II: The patient's visual acuity and visual fields are normal   CN III, IV, VI: The patient's pupils are equally round and reactive to light and ocular movements are normal   CN V: The patient has normal facial sensation  CN VII:  The patient has symmetric facial movement  CN VIII:  The patient's hearing is normal   CN IX, X: The patient has symmetric palate movement and normal gag reflex  CN XI: The patient's shoulder shrug strength is normal   CN XII: The patient's tongue is midline without atrophy or fasciculations  Motor    Patient with mixed tremors bilaterally  His resting tremor was variable  Times throughout the exam he would have moderate tremor on the right with tendency to hold his right arm under his left  Unclear if this was more anxiety related  With distraction he was noted to have some mild right sided rest tremor  Mild action tremor with finger to nose testing more on the left, mild postural tremor  No bradykinesia with finger taps 0,0, MIKAYLA 0,0 or heel taps  Mild with hand  0,1  No rigidity noted  Normal handwriting, no micrographia  Very slightly tremulous spirals bilaterally  Sensory    Decreased vibration at the ankles bilaterally, more on the left  Normal at the knee  Patchy decrease in pinprick throughout the LEs       Gait and Coordination    Arose without issues from the chair   Good stride length with no shuffling or freezing  Overall good arm swing  Rounded shoulders  Retropulsed on pull test but able to recover after 3 steps  ROS:    Review of Systems   Constitutional: Negative  HENT: Positive for sore throat  Buzzing and pressure in ear  Eyes: Negative  Respiratory: Positive for shortness of breath  Cardiovascular: Positive for chest pain  Gastrointestinal: Positive for constipation  Endocrine: Negative  Genitourinary: Positive for urgency  Musculoskeletal: Positive for gait problem  Skin: Negative  Allergic/Immunologic: Negative  Neurological: Positive for tremors  Hematological: Negative  Psychiatric/Behavioral: Positive for confusion and sleep disturbance

## 2018-03-19 ENCOUNTER — OFFICE VISIT (OUTPATIENT)
Dept: SLEEP CENTER | Facility: CLINIC | Age: 73
End: 2018-03-19

## 2018-03-19 VITALS
HEART RATE: 74 BPM | DIASTOLIC BLOOD PRESSURE: 68 MMHG | SYSTOLIC BLOOD PRESSURE: 112 MMHG | WEIGHT: 202 LBS | HEIGHT: 67 IN | BODY MASS INDEX: 31.71 KG/M2

## 2018-03-19 DIAGNOSIS — G47.09 OTHER INSOMNIA: ICD-10-CM

## 2018-03-19 DIAGNOSIS — J31.0 CHRONIC RHINITIS, UNSPECIFIED TYPE: ICD-10-CM

## 2018-03-19 DIAGNOSIS — R68.2 DRY MOUTH: ICD-10-CM

## 2018-03-19 DIAGNOSIS — G47.61 PLMD (PERIODIC LIMB MOVEMENT DISORDER): ICD-10-CM

## 2018-03-19 DIAGNOSIS — R45.86 MOOD DISTURBANCE: ICD-10-CM

## 2018-03-19 DIAGNOSIS — J34.89 DRY NOSE: ICD-10-CM

## 2018-03-19 DIAGNOSIS — G47.33 OSA (OBSTRUCTIVE SLEEP APNEA): Primary | ICD-10-CM

## 2018-03-19 DIAGNOSIS — G47.10 HYPERSOMNIA: ICD-10-CM

## 2018-03-19 NOTE — PROGRESS NOTES
Review of Systems      Genitourinary frequent urination at night   Cardiology palpitations/fluttering feeling in your chest and lightheadedness   Gastrointestinal heartburn/acid reflux   Neurology numbness/tingling of an extremity, forgetfulness, confusion and difficulity finding words   Constitutional none   Integumentary none   Psychiatry anxiety and depression   Musculoskeletal none   Pulmonary shortness of breath and chest tightness   ENT decreased hearing and trouble swallowing   Endocrine none   Hematological none

## 2018-03-19 NOTE — PROGRESS NOTES
Follow-Up Note - Sleep Center   Brien Newell  67 y o  male  NHA:[birth year]  XNF:[id number]    CC: I saw this patient for follow-up in clinic today for his Sleep Disordered Breathing, Coexisting Sleep and Medical Problems  Medications, Past, Family & Social History were reviewed  [Interval changes notable for he completed cardiac rehab]   He  has a past medical history of AAA (abdominal aortic aneurysm) ([place] [address] ); Anemia; Coronary artery disease; Diabetes mellitus ([place] [address] ); GERD (gastroesophageal reflux disease); Hiatal hernia; Hyperlipidemia; Hypertension; Memory changes; Myocardial infarction; Onychomycosis; Parkinson disease ([place] [address] ); Parkinson disease ([place] [address] ); Pneumonia; and PVD (peripheral vascular disease) ([place] [address] )  He has a current medication list which includes the following prescription(s): aspirin, buspirone, citalopram, clopidogrel, co-enzyme q-10, cyanocobalamin, famotidine, isosorbide dinitrate, metformin, metoprolol tartrate, pantoprazole, topiramate, valsartan, and atorvastatin  ROS: reviewed, see attached [& significant for ongoing cardiac symptoms and anxiety but he feels depression has improved]  HPI:  With respect to positive airway pressure therapy (PAP) compliance data download shows: [ he is using PAP > 4 hours per night 53 % of the time and AHI is 3 5/hour at pressure of 22/18cm H2O ]   [address] reports:   -  significant difficulty tolerating PAP; [ nasal congesetion, air leaks from mask and mask discomfort] he reports excessive mucus and removes the mask in frustration  He also has dryness of mucosa  -  benefiting from use ; [sleeping better]   - he moves excessively during sleep and has jerking movements during sleep  Sleep Routine:  [address] reports an irregular routine and getting insufficient sleep at around 5 hours; he has difficulty initiating and maintaining sleep   He awakens spontaneously feeling unrefreshed He has excessive drowsiness   [facility] rated himself at Total score: 8 /24 on the [place] sleepiness scale ]  He naps during the daytime and tends to doze off when sedentary  EXAM: Vitals are stable: Blood pressure 112/68, pulse 74, height 5' 7" (1 702 m), weight 91 6 kg (202 lb)  Body mass index is 31 64 kg/m²  Dixie Yasmeen Neck Circumference: 42 cm  Patient is alert, orientated, cooperative [and in no distress]  Mental state [appears normal]  There are [no] facial pressure marks or rashes  [  ] Physical findings are essentially unchanged from previous  IMPRESSION:     1  CHAN (obstructive sleep apnea)  Sleep F/U W/Compliance   2  PLMD (periodic limb movement disorder)     3  Other insomnia     4  Hypersomnia     5  Chronic rhinitis, unspecified type      Multiple comorbidities as outlined above    PLAN:  1  Treatment with  PAP is medically necessary and Rhea Hansenosta is agreable to continue use  2  Pressure setting: [Continue at 22/18 cm]   3  Instruction on care of equipment and strategies to improve comfort with use of PAP were discussed [:controlling mucosal dryness, nasal symptoms and Mask leaks]  4  Care coordinated with DME provider and prescription to replace supplies as needed was provided  5  Need for compliance with therapy and weight reduction were emphasized  6  Cognitive behavioral therapy was initiated, Sleep Hygiene and behavioral techniques to manage Insomnia were discussed  Specifically avoiding daytime naps, keeping a regular routine and limiting time in bed to 6 hours  7  I advise regular nasal state saline rinse and adding a topical steroid if necessary  8  Consideration should be given to an alternate to Celexa that may underlie the periodic limb movements of sleep  The alternate would be to add a dopamine agonists such as Mirapex or Requip  9  He wanted to follow up in [1 year] [and will call sooner if needed]  Thank you for allowing me to participate in the care of this patient      Sincerely,    Joleen Caicedo MD  Board Certified Specialist

## 2018-04-12 ENCOUNTER — OFFICE VISIT (OUTPATIENT)
Dept: NEUROLOGY | Facility: CLINIC | Age: 73
End: 2018-04-12
Payer: MEDICARE

## 2018-04-12 VITALS
RESPIRATION RATE: 16 BRPM | WEIGHT: 204 LBS | HEART RATE: 66 BPM | BODY MASS INDEX: 32.02 KG/M2 | DIASTOLIC BLOOD PRESSURE: 75 MMHG | SYSTOLIC BLOOD PRESSURE: 127 MMHG | HEIGHT: 67 IN

## 2018-04-12 DIAGNOSIS — R25.9 MIXED ACTION AND RESTING TREMOR: Primary | ICD-10-CM

## 2018-04-12 PROCEDURE — 99214 OFFICE O/P EST MOD 30 MIN: CPT | Performed by: NURSE PRACTITIONER

## 2018-04-12 NOTE — ASSESSMENT & PLAN NOTE
Not on primidone, but unclear as to why  May have been stopped by geriatrics  He does not recall if he felt better while taking it  No parkinsonian features on exam today  He currently feels that his tremors are better, noting decreased frequency and severity  He does still feel that anxiety is his biggest trigger  He is functioning well in his day to day activities and does not want to start any medications at this time  He will call with any new or worsening symptoms

## 2018-04-12 NOTE — PROGRESS NOTES
Patient ID: Gretchen Silverio is a 67 y o  male  Assessment/Plan:    Mixed action and resting tremor  Not on primidone, but unclear as to why  May have been stopped by geriatrics  He does not recall if he felt better while taking it  No parkinsonian features on exam today  He currently feels that his tremors are better, noting decreased frequency and severity  He does still feel that anxiety is his biggest trigger  He is functioning well in his day to day activities and does not want to start any medications at this time  He will call with any new or worsening symptoms  Subjective:    John Galicia is a 67year old left handed male with 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, moderate coronary artery disease s/p cath 2012, hypertension, diabetes, hyperlipedimeia, GERD and tremors who present for follow up  To review, he presented to the ED on 5/17/17 with complaints of persistent chest pain and SOB  He was also noted to have left sided facial droop by his family a few days prior however this was improving  He was still having some speech difficulties per the family and some left sided weakness with movements  His troponins were negative, ECHO LVEF 60%, with left ventricle concentric remodeling, mild TR and moderate AS  Stress test normal  Chest pain felt to be GI related  Stroke workup was normal  Brain MRI revealed no acute ischemic changes, MRA/MRV was negative for acute abnormalities, dominant right vertebral artery with hypoplastic left vertebral artery, Carotid doppler revealed less than 50% stenosis bilateral ICA and MRI carotids revealed no occlusive disease  His left sided weakness and facial droop resolved in the hospital  Neurology was consulted and felt that this may have been a TIA  His stroke preventions were maximized  It was recommended he remain on ASA and Plavix daily  He remains on Metoprolol and lisinopril for BP control   Atorvastatin was asked to be increased to 40mg daily for cholesterol  He was noted to have evidence of neuropathy on exam  It was recommended that he have B12, TSH and SPEP checked along with EMG as outpatient  EMG was performed and revealed mild polyneuropathy  He did not fit the clinical picture for PD however he was to remain on his home dose of Sinemet  He had an EGD performed with GI and it was felt that his dysphagia was related to muscular weakness  Swallow study revealed oral and pharyngeal dysphagia with no aspiration  He quit drinking in [facility] and smoking in 1973  After review of his PCP notes it appears he began complaining of left sided hand tremor in 7/2016  At that time it was noted more when eating cereal in the AM  He was started on a trial of Sinemet 1tab qam  Per the notes there was no clear improvement with this medication and it was felt that the tremors were likely more related to anxiety  His Citalopram dose has been increased in the past and discussed stopping the Sinemet  No family history of tremors or PD  He had side effects with Neurontin in the past   He had no worsening of symptoms after stopping Sinemet  At his last visit he continued to have some bothersome mixed tremors and postural instability, without clear parkinsonian symptoms  He was started on a trial of topiramate, with a goal dose of 1 tab BID  Today Mr Shashi Walton presents for follow-up of his tremors  He feels that things are going well, and that his tremors may be a little better  He feels that his tremor is not as often and not as severe  He is not on the topiramate any longer, but does not remember why  He thinks the geriatrician they had been seeing may have stopped it but he is not sure  He does not remember if he felt better while on it, but feels that he was not likely on it for very long  He continues to have tremors in the left hand, worse than right  He does not notice tremors anywhere else  He feels that anxiety is his biggest trigger   His tremor does interfere with eating and drinking at times, but not all the time  He remains independent in all ADLs  He does note coughing while eating or drinking, but he does report that he eats fast  He notes that a recent swallowing study was "good" and they told him to slow down his eating  He does note occasional dizziness in the morning, but denies syncope  He does monitor his BP at home and reports that it is usually high in the morning  He also follows with a cardiologist  He feels that his walking is good  No recent falls  He is not sleeping well, which he reports is due to his CPAP mask  No hallucinations  Objective:    Blood pressure 127/75, pulse 66, resp  rate 16, height 5' 7" (1 702 m), weight 92 5 kg (204 lb)  Physical Exam   Constitutional: He appears well-developed and well-nourished  HENT:   Head: Normocephalic  Eyes: EOM are normal  Pupils are equal, round, and reactive to light  Neurological: Gait normal    Psychiatric: He has a normal mood and affect  His speech is normal and behavior is normal    Vitals reviewed  Neurological Exam    Mental Status  The patient is alert  His speech is normal  He has normal attention span and concentration  He follows multi-step commands  He has a normal fund of knowledge  Cranial Nerves    CN II: The patient's visual acuity and visual fields are normal   CN III, IV, VI: The patient's pupils are equally round and reactive to light and ocular movements are normal   CN V: The patient has normal facial sensation  CN VII:  The patient has symmetric facial movement  CN VIII:  The patient's hearing is normal   CN IX, X: The patient has symmetric palate movement and normal gag reflex  CN XI: The patient's shoulder shrug strength is normal   CN XII: The patient's tongue is midline without atrophy or fasciculations      Motor    Mild, intermittent lower lip/jaw tremor  No rest tremor  Mild postural tremor L>R  No action tremor  No bradykinesia or rigidity on exam    Very slight tremor on spirals  Handwriting legible     Sensory  The patient's sensation is to light touch  Reflexes  He has no glabellar tap release signs  Gait and Coordination  The patient has normal gait and station  Able to rise from chair without issue  Slightly rounded shoulders         ROS:    Review of Systems   Constitutional: Negative  HENT: Positive for hearing loss and trouble swallowing  Eyes:        Blurred Vision   Respiratory: Positive for shortness of breath  Cardiovascular: Positive for chest pain  Gastrointestinal:        Loss of bowel control   Endocrine: Negative  Genitourinary:        Loss of bladder control   Musculoskeletal: Positive for back pain and neck pain  Joint pain, muscle pain   Skin: Negative  Allergic/Immunologic: Negative  Neurological: Positive for tremors and light-headedness  Twitching, numbness, tingling, confusion, memory problems, clumsiness  Hematological:        Clotting   Psychiatric/Behavioral: Positive for sleep disturbance  The patient is nervous/anxious           Depression

## 2018-04-13 ENCOUNTER — TELEPHONE (OUTPATIENT)
Dept: FAMILY MEDICINE CLINIC | Facility: CLINIC | Age: 73
End: 2018-04-13

## 2018-04-13 DIAGNOSIS — F41.1 GAD (GENERALIZED ANXIETY DISORDER): Primary | ICD-10-CM

## 2018-04-13 RX ORDER — BUSPIRONE HYDROCHLORIDE 5 MG/1
2.5 TABLET ORAL 2 TIMES DAILY
Qty: 30 TABLET | Refills: 4 | Status: SHIPPED | OUTPATIENT
Start: 2018-04-13 | End: 2018-06-06 | Stop reason: SDUPTHER

## 2018-04-13 NOTE — TELEPHONE ENCOUNTER
[facility] CALLING TO ASK IF YOU WOULD BE WILLING TO REFILL THE PTS BUSPIRONE 5MG  THE PT IS NO LONGER SEEING THE  DOCTOR WHO ORIGINALLY PRESCRIBED IT AND [facility] DOES THE REFILLS ON THE PTS BUBBLEPACKS   PT TAKES 2 5MG BY MOUTH 2 TIMES A DAY

## 2018-04-17 ENCOUNTER — TELEPHONE (OUTPATIENT)
Dept: FAMILY MEDICINE CLINIC | Facility: CLINIC | Age: 73
End: 2018-04-17

## 2018-04-17 NOTE — TELEPHONE ENCOUNTER
PATIENT IS NO LONGER A PATIENT OF Dr Black Trujillo 5 MG  2 PILLS DAILY #60  30 DAY SUPPLY    [facility]

## 2018-04-17 NOTE — TELEPHONE ENCOUNTER
The rx was originally 2 5 mg of buspar bid- and it was ordered on 4/13 with 4 refills-  Please confirm dose- 2 5 bid or 5 mg bid and update chart if necessary-  It was already called in on 4/13 for the 2 5 mg per their instructions

## 2018-04-17 NOTE — TELEPHONE ENCOUNTER
Spoke with Preston Ding, dosing is correct  [facility] and [facility] at [address] [facility]   [facility]elled [facility], verbally called it into [facility]

## 2018-05-22 ENCOUNTER — TELEPHONE (OUTPATIENT)
Dept: FAMILY MEDICINE CLINIC | Facility: CLINIC | Age: 73
End: 2018-05-22

## 2018-05-22 NOTE — TELEPHONE ENCOUNTER
Patient's daughter Rod Pradhan called to schedule TCM for Dad with Dr Ab Nash on 5/29/18,    [id number] and D/C  5/21/18      If you have any questions please call daughter  [phone]

## 2018-05-23 ENCOUNTER — TRANSITIONAL CARE MANAGEMENT (OUTPATIENT)
Dept: FAMILY MEDICINE CLINIC | Facility: CLINIC | Age: 73
End: 2018-05-23

## 2018-05-29 ENCOUNTER — OFFICE VISIT (OUTPATIENT)
Dept: FAMILY MEDICINE CLINIC | Facility: CLINIC | Age: 73
End: 2018-05-29
Payer: MEDICARE

## 2018-05-29 VITALS
TEMPERATURE: 99.1 F | WEIGHT: 198 LBS | DIASTOLIC BLOOD PRESSURE: 72 MMHG | RESPIRATION RATE: 16 BRPM | SYSTOLIC BLOOD PRESSURE: 116 MMHG | HEART RATE: 68 BPM | BODY MASS INDEX: 31.01 KG/M2

## 2018-05-29 DIAGNOSIS — K21.9 CHRONIC GERD: ICD-10-CM

## 2018-05-29 DIAGNOSIS — I25.119 CORONARY ARTERY DISEASE INVOLVING NATIVE HEART WITH ANGINA PECTORIS, UNSPECIFIED VESSEL OR LESION TYPE (HCC): ICD-10-CM

## 2018-05-29 DIAGNOSIS — E11.9 TYPE 2 DIABETES MELLITUS WITHOUT COMPLICATION, WITHOUT LONG-TERM CURRENT USE OF INSULIN (HCC): ICD-10-CM

## 2018-05-29 DIAGNOSIS — IMO0001 TRANSITION OF CARE PERFORMED WITH SHARING OF CLINICAL SUMMARY: Primary | ICD-10-CM

## 2018-05-29 PROCEDURE — 99495 TRANSJ CARE MGMT MOD F2F 14D: CPT | Performed by: INTERNAL MEDICINE

## 2018-05-29 RX ORDER — RANITIDINE 300 MG/1
300 TABLET ORAL
Qty: 30 TABLET | Refills: 0 | Status: SHIPPED | OUTPATIENT
Start: 2018-05-29 | End: 2018-06-19 | Stop reason: SDUPTHER

## 2018-05-29 RX ORDER — CLOPIDOGREL BISULFATE 75 MG/1
TABLET ORAL
Refills: 0
Start: 2018-05-29

## 2018-05-29 RX ORDER — AMLODIPINE BESYLATE 5 MG/1
5 TABLET ORAL DAILY
COMMUNITY
Start: 2018-05-22 | End: 2018-07-25

## 2018-05-29 RX ORDER — VALSARTAN 320 MG/1
TABLET ORAL
Refills: 0
Start: 2018-05-29 | End: 2018-06-12 | Stop reason: SDUPTHER

## 2018-05-29 NOTE — ASSESSMENT & PLAN NOTE
Hold first dose of valsartan 1/2 tab (160mg)  Move norvasc to pm  Hold metformin  Move plavix to pm- may be irritating his stomach    Follow bp- hypotension today in office with positive tilt

## 2018-05-29 NOTE — PROGRESS NOTES
ASSESSMENT and PLAN:  Marianne  is a 67 y o  male with:   Problem List Items Addressed This Visit     Type 2 diabetes mellitus without complication ([address] )     Hold metformin - may be giving him gi distress  Update us in one week- he has appt         Relevant Medications    valsartan (DIOVAN) 320 MG tablet    Transition of care performed with sharing of clinical summary - Primary     Hold first dose of valsartan 1/2 tab (160mg)  Move norvasc to pm  Hold metformin  Move plavix to pm- may be irritating his stomach    Follow bp- hypotension today in office with positive tilt          Chronic GERD     On ppi  On famotadine- change to ranitidine  Consider reglan  cponsider egd          Relevant Medications    ranitidine (ZANTAC) 300 MG tablet    valsartan (DIOVAN) 320 MG tablet    Coronary artery disease involving native heart with angina pectoris (HCC)    Relevant Medications    amLODIPine (NORVASC) 5 mg tablet    clopidogrel (PLAVIX) 75 mg tablet        Date and time hospital follow up call was made:  5/23/2018  [facility] reviewed:  Records reviewed  Patient was hopsitalized at:  [facility]  Date of admission:  5/19/18  Date of discharge:  5/21/18  Disposition:  Home  Were the patients medicaitons reviewed and updated:  Yes  Current symptoms:  Shortness of breath  Shortness of breath severity:  Moderate  Post hospital issues:  None  Should patient be enrolled in anticoag monitoring?:  No  Scheduled for follow up?:  Yes  Did you obtain your prescribed medications:  Yes  Do you need help managing your perscriptions or medications:  No  Is transportation to your appointments needed:  No  Living Arrangements:  Children, Family members  Support System:  Children  The type of support provided:  Emotional  Do you have social support:  Yes, as much as I need  Are you recieving outpatient services:  No  Are you recieving home care services:  No  Have you fallen in the last 12 months: No  Counseling:  Patient, Family  Comments:  Patient has been cleared by Cardiology/states SOB comes and goes         SUBJECTIVE:  Jonny Amaya is a 67 y o  male who presents today with a chief complaint of Transition of Care Management    Patient here for cory  He has had the same complaint of sob, left arm feels like a bp cuff is inflating and he gets pressure in abdomen- radiates to the right ; last night he vomited after drinking tear  He had cardiac work up in hospital- reviewed  He takes metformin at supper; and plavix first thing in am; he feels poorly when he wakes up  He feels poorly  His blood pressure is low  Review of Systems   Constitutional: Positive for activity change (PT TIRED,  ), appetite change and fatigue  Negative for fever and unexpected weight change  HENT: Negative  Eyes: Negative  Respiratory: Negative  Cardiovascular: Negative  Gastrointestinal: Negative  Endocrine: Negative  Genitourinary: Negative  Musculoskeletal: Negative  Allergic/Immunologic: Negative  Neurological: Positive for dizziness and light-headedness  Negative for tremors, seizures, syncope, speech difficulty and numbness  Hematological: Negative  Psychiatric/Behavioral: Negative  I have reviewed the patient's PMH, Social History, Medication List and Allergies  OBJECTIVE:  /72 (BP Location: Left arm, Patient Position: Sitting, Cuff Size: Large)   Pulse 68   Temp 99 1 °F (37 3 °C) (Tympanic)   Resp 16   Wt 89 8 kg (198 lb)   BMI 31 01 kg/m²   Physical Exam   Constitutional: He is oriented to person, place, and time  Chronically ill male   HENT:   Head: Normocephalic and atraumatic  Right Ear: External ear normal    Left Ear: External ear normal    Nose: Nose normal    Mouth/Throat: Oropharynx is clear and moist    Eyes: Conjunctivae and EOM are normal  Pupils are equal, round, and reactive to light  No scleral icterus  Neck: Normal range of motion  Neck supple  No JVD present  No tracheal deviation present  No thyromegaly present  Cardiovascular: Normal rate, regular rhythm, normal heart sounds and intact distal pulses  Pulmonary/Chest: Effort normal and breath sounds normal  No respiratory distress  He has no wheezes  He has no rales  Abdominal: Soft  Bowel sounds are normal  He exhibits no distension  There is no tenderness  There is no rebound  Musculoskeletal: Normal range of motion  He exhibits no edema or tenderness  Lymphadenopathy:     He has no cervical adenopathy  Neurological: He is alert and oriented to person, place, and time  He has normal reflexes  No cranial nerve deficit  Coordination normal    Skin: Skin is warm and dry  No rash noted  No erythema  Psychiatric: He has a normal mood and affect  His behavior is normal  Judgment and thought content normal    Nursing note and vitals reviewed

## 2018-05-29 NOTE — PATIENT INSTRUCTIONS
CUT VALSARTAN DOWN TO 1/2 TAB ONLY ONCE A DAY- AT BED  TAKE AMLODIPINE AT SUPPER WITH FOOD  STOP METFORMIN FOR A WEEK- UPDATE ME NEXT WEEK WITH BLOOD SUGARS  MOVE PLAVIX TO SUPPER ALSO  - TAKE WITH FOOD      ADD RANITIDINE (INSTEAD OF FAMOTADINE) AT NIGHT FOR STOMACH - STAY ON PANTOPRAZOLE IN AM

## 2018-06-05 ENCOUNTER — TELEPHONE (OUTPATIENT)
Dept: FAMILY MEDICINE CLINIC | Facility: CLINIC | Age: 73
End: 2018-06-05

## 2018-06-05 DIAGNOSIS — E11.9 TYPE 2 DIABETES MELLITUS WITHOUT COMPLICATION, WITHOUT LONG-TERM CURRENT USE OF INSULIN (HCC): Primary | ICD-10-CM

## 2018-06-05 NOTE — TELEPHONE ENCOUNTER
BLOOD PRESSURE READING    5/31  7:40 am 142/75   6/1     10:15 am 133/76  6/2 8:10 am 132/89  6/3 8:00 am 146/74  6/4 8:30 /86   6/5 9:00 AM  111/79        SUGAR READINGS    5/31 7:40   6/1 10:15   6/2 8:10   6/3 8:00   6/4 8:30   6/5 9:10 am 138   9:00       Daughter Reyes Obey [phone]

## 2018-06-05 NOTE — TELEPHONE ENCOUNTER
Belkis Evans stay off of the meds -  Stay with same changes we made at [facility]- no metformiin- get labs in 3 weeks

## 2018-06-06 ENCOUNTER — TELEPHONE (OUTPATIENT)
Dept: FAMILY MEDICINE CLINIC | Facility: CLINIC | Age: 73
End: 2018-06-06

## 2018-06-06 DIAGNOSIS — F41.1 GAD (GENERALIZED ANXIETY DISORDER): ICD-10-CM

## 2018-06-06 DIAGNOSIS — I10 ESSENTIAL HYPERTENSION: Primary | ICD-10-CM

## 2018-06-06 NOTE — TELEPHONE ENCOUNTER
Pts daughter calling to ask if you would send a 2weeks worth supply of his metoprolol 25mg to [employer]   Pt usually gets it from the [place], but they didn't send it in time to the pharmacy and he will be out of them today

## 2018-06-07 RX ORDER — BUSPIRONE HYDROCHLORIDE 5 MG/1
2.5 TABLET ORAL 2 TIMES DAILY
Qty: 30 TABLET | Refills: 3 | Status: SHIPPED | OUTPATIENT
Start: 2018-06-07 | End: 2018-06-11

## 2018-06-08 ENCOUNTER — DOCUMENTATION (OUTPATIENT)
Dept: FAMILY MEDICINE CLINIC | Facility: CLINIC | Age: 73
End: 2018-06-08

## 2018-06-08 DIAGNOSIS — E11.9 TYPE 2 DIABETES MELLITUS WITHOUT COMPLICATION, WITHOUT LONG-TERM CURRENT USE OF INSULIN (HCC): Primary | ICD-10-CM

## 2018-06-08 DIAGNOSIS — E11.9 TYPE 2 DIABETES MELLITUS WITHOUT COMPLICATION, UNSPECIFIED WHETHER LONG TERM INSULIN USE (HCC): Primary | ICD-10-CM

## 2018-06-11 ENCOUNTER — OFFICE VISIT (OUTPATIENT)
Dept: FAMILY MEDICINE CLINIC | Facility: CLINIC | Age: 73
End: 2018-06-11
Payer: MEDICARE

## 2018-06-11 VITALS
WEIGHT: 196 LBS | DIASTOLIC BLOOD PRESSURE: 78 MMHG | HEART RATE: 64 BPM | TEMPERATURE: 98.3 F | HEIGHT: 67 IN | BODY MASS INDEX: 30.76 KG/M2 | SYSTOLIC BLOOD PRESSURE: 118 MMHG

## 2018-06-11 DIAGNOSIS — R41.89 COGNITIVE CHANGES: ICD-10-CM

## 2018-06-11 DIAGNOSIS — R25.9 MIXED ACTION AND RESTING TREMOR: ICD-10-CM

## 2018-06-11 DIAGNOSIS — E11.9 TYPE 2 DIABETES MELLITUS WITHOUT COMPLICATION, WITHOUT LONG-TERM CURRENT USE OF INSULIN (HCC): Primary | ICD-10-CM

## 2018-06-11 DIAGNOSIS — E11.9 TYPE 2 DIABETES MELLITUS WITHOUT COMPLICATION, UNSPECIFIED WHETHER LONG TERM INSULIN USE (HCC): ICD-10-CM

## 2018-06-11 PROCEDURE — 99214 OFFICE O/P EST MOD 30 MIN: CPT | Performed by: INTERNAL MEDICINE

## 2018-06-11 RX ORDER — DONEPEZIL HYDROCHLORIDE 10 MG/1
TABLET, FILM COATED ORAL
Qty: 30 TABLET | Refills: 1 | Status: SHIPPED | OUTPATIENT
Start: 2018-06-11 | End: 2018-08-31 | Stop reason: SDUPTHER

## 2018-06-11 RX ORDER — TOPIRAMATE 25 MG/1
TABLET ORAL
Qty: 60 TABLET | Refills: 0
Start: 2018-06-11 | End: 2020-10-09 | Stop reason: ALTCHOICE

## 2018-06-11 NOTE — PATIENT INSTRUCTIONS
Stop buspar  Trial of aricept 1/2 tab at bed for a week then 1 full tab  Follow up with labs in 6 weeks  Cut topomax in half to 12 5 mg

## 2018-06-11 NOTE — PROGRESS NOTES
ASSESSMENT and PLAN:  Isael Hu is a 67 y o  male with:   Problem List Items Addressed This Visit     Type 2 diabetes mellitus without complication ([facility] [address] ) - Primary    Relevant Medications    glucose blood (ACCU-CHEK PRIMO) test strip    Lancets (ACCU-CHEK SOFT TOUCH) lancets    Other Relevant Orders    Comprehensive metabolic panel    CBC and differential    HEMOGLOBIN A1C W/ EAG ESTIMATION    UA w Reflex to Microscopic w Reflex to Culture -Lab Collect    CBC and differential    Mixed action and resting tremor    Relevant Medications    topiramate (TOPAMAX) 25 mg tablet      Other Visit Diagnoses     Cognitive changes        Relevant Medications    donepezil (ARICEPT) 10 mg tablet    Other Relevant Orders    CBC and differential          SUBJECTIVE:  Isael Hu is a 67 y o  male who presents today with a chief complaint of Diabetes Type 2 (6 month follow up); Hypertension; and Anxiety    :Patient here with his son; he feels like he gets tunnel vision with wavy vision on the sides of his visual fields; at last visit cahnges were made; He feels more anxious; His stomach has not changed despite stopping metformin for a week  Changing dose of valsartan and moving plavix to later in theday  He is check ing his blood sugars his fbs is in 120 range with occasional 133 and 184 blood sguars  He is on topomax  He has trouble finding words and gets anxious due to not being able to communicate  At times  Review of Systems   Constitutional: Positive for fatigue  Negative for activity change (PT TIRED,  ), appetite change, fever and unexpected weight change  HENT: Negative  Eyes: Negative  Respiratory: Negative  Cardiovascular: Negative  Gastrointestinal: Negative  Endocrine: Negative  Genitourinary: Negative  Musculoskeletal: Negative  Skin: Negative  Allergic/Immunologic: Negative  Neurological: Positive for dizziness and light-headedness   Negative for tremors, seizures, syncope, facial asymmetry, speech difficulty, numbness and headaches  Hematological: Negative  Psychiatric/Behavioral: Positive for dysphoric mood  Negative for sleep disturbance  The patient is nervous/anxious  Mmse 28/30       I have reviewed the patient's PMH, Social History, Medication List and Allergies  OBJECTIVE:  /78 (BP Location: Left arm, Patient Position: Sitting, Cuff Size: Large)   Pulse 64   Temp 98 3 °F (36 8 °C)   Ht 5' 7" (1 702 m)   Wt 88 9 kg (196 lb)   BMI 30 70 kg/m²   Physical Exam   Constitutional: He is oriented to person, place, and time  He appears well-developed and well-nourished  HENT:   Head: Normocephalic and atraumatic  Right Ear: External ear normal    Left Ear: External ear normal    Nose: Nose normal    Mouth/Throat: Oropharynx is clear and moist    Hard of hearing   Eyes: Conjunctivae and EOM are normal  Pupils are equal, round, and reactive to light  Neck: Normal range of motion  Neck supple  No tracheal deviation present  No thyromegaly present  Cardiovascular: Normal rate, regular rhythm, normal heart sounds and intact distal pulses  Pulmonary/Chest: Effort normal and breath sounds normal  No respiratory distress  He has no wheezes  Abdominal: Soft  Bowel sounds are normal    Musculoskeletal: Normal range of motion  He exhibits no edema or tenderness  Neurological: He is alert and oriented to person, place, and time  He has normal reflexes  No cranial nerve deficit  Coordination normal    Skin: Skin is dry  Psychiatric: His behavior is normal  Judgment and thought content normal    Anxious male;  mmse stable  Nursing note and vitals reviewed

## 2018-06-12 DIAGNOSIS — K21.9 CHRONIC GERD: ICD-10-CM

## 2018-06-12 DIAGNOSIS — E11.9 TYPE 2 DIABETES MELLITUS WITHOUT COMPLICATION, WITHOUT LONG-TERM CURRENT USE OF INSULIN (HCC): ICD-10-CM

## 2018-06-12 RX ORDER — VALSARTAN 320 MG/1
TABLET ORAL
Qty: 90 TABLET | Refills: 1 | Status: SHIPPED | OUTPATIENT
Start: 2018-06-12 | End: 2018-07-23

## 2018-06-19 DIAGNOSIS — K21.9 CHRONIC GERD: ICD-10-CM

## 2018-06-20 RX ORDER — RANITIDINE 300 MG/1
TABLET ORAL
Qty: 30 TABLET | Refills: 4 | Status: SHIPPED | OUTPATIENT
Start: 2018-06-20 | End: 2018-12-05 | Stop reason: SDUPTHER

## 2018-06-29 LAB — HBA1C MFR BLD HPLC: 6.6 %

## 2018-07-10 DIAGNOSIS — I10 ESSENTIAL HYPERTENSION: ICD-10-CM

## 2018-07-14 DIAGNOSIS — E11.9 TYPE 2 DIABETES MELLITUS WITHOUT COMPLICATION, UNSPECIFIED WHETHER LONG TERM INSULIN USE (HCC): ICD-10-CM

## 2018-07-17 RX ORDER — LANCETS
EACH MISCELLANEOUS
Qty: 100 EACH | Refills: 0 | Status: SHIPPED | OUTPATIENT
Start: 2018-07-17

## 2018-07-23 ENCOUNTER — OFFICE VISIT (OUTPATIENT)
Dept: FAMILY MEDICINE CLINIC | Facility: CLINIC | Age: 73
End: 2018-07-23
Payer: MEDICARE

## 2018-07-23 VITALS
TEMPERATURE: 98.3 F | HEIGHT: 67 IN | HEART RATE: 56 BPM | WEIGHT: 200 LBS | DIASTOLIC BLOOD PRESSURE: 64 MMHG | BODY MASS INDEX: 31.39 KG/M2 | SYSTOLIC BLOOD PRESSURE: 118 MMHG | OXYGEN SATURATION: 98 %

## 2018-07-23 DIAGNOSIS — G47.33 OSA (OBSTRUCTIVE SLEEP APNEA): ICD-10-CM

## 2018-07-23 DIAGNOSIS — I25.119 CORONARY ARTERY DISEASE INVOLVING NATIVE HEART WITH ANGINA PECTORIS, UNSPECIFIED VESSEL OR LESION TYPE (HCC): ICD-10-CM

## 2018-07-23 DIAGNOSIS — I10 ESSENTIAL HYPERTENSION: Primary | ICD-10-CM

## 2018-07-23 DIAGNOSIS — E11.9 TYPE 2 DIABETES MELLITUS WITHOUT COMPLICATION, WITHOUT LONG-TERM CURRENT USE OF INSULIN (HCC): ICD-10-CM

## 2018-07-23 PROBLEM — R25.2 MUSCLE CRAMP: Status: ACTIVE | Noted: 2018-07-23

## 2018-07-23 PROCEDURE — 99214 OFFICE O/P EST MOD 30 MIN: CPT | Performed by: INTERNAL MEDICINE

## 2018-07-23 RX ORDER — ISOSORBIDE MONONITRATE 60 MG/1
1 TABLET, EXTENDED RELEASE ORAL DAILY
COMMUNITY
Start: 2018-07-10 | End: 2020-10-09 | Stop reason: ALTCHOICE

## 2018-07-23 RX ORDER — OLMESARTAN MEDOXOMIL 40 MG/1
40 TABLET ORAL DAILY
Qty: 90 TABLET | Refills: 3 | Status: SHIPPED | OUTPATIENT
Start: 2018-07-23 | End: 2020-10-12 | Stop reason: ALTCHOICE

## 2018-07-23 RX ORDER — LIDOCAINE 4 G/G
1 PATCH TOPICAL DAILY
COMMUNITY
Start: 2018-07-02 | End: 2018-07-23 | Stop reason: ALTCHOICE

## 2018-07-23 NOTE — PROGRESS NOTES
ASSESSMENT and PLAN:  Gaudencio Bryant is a 67 y o  male with:   Problem List Items Addressed This Visit     Essential hypertension - Primary    Relevant Medications    olmesartan (BENICAR) 40 mg tablet    Type 2 diabetes mellitus without complication (HCC)     Lab Results   Component Value Date    HGBA1C 6 6 (H) 01/18/2018       No results for input(s): POCGLU in the last 72 hours  Blood Sugar Average: Last 72 hrs:           CHAN (obstructive sleep apnea)     Pt not tolerating cpap- unable to get past 3 hours per night  Coronary artery disease involving native heart with angina pectoris ([facility] [address] )     Follow up with cardiology  Change valsartan to olmesartan 40 mg  Continue imdur  Hold statin for a week and see if cramping gets better- update me reese  Week- if better we can restart at every other day  On plavix and beta blocker         Relevant Medications    isosorbide mononitrate (IMDUR) 60 mg 24 hr tablet          SUBJECTIVE:  Gaudencio Bryant is a 67 y o  male who presents today with a chief complaint of Diabetes Type 2 (6 week follow up labs) and Trauma (Chest wall strain-07/02/2018- ER follow up)    Patient here for a follow up office visit; [facility] and her hga1c is 6 6;  lft are stable;  bs was 65;  Creat 0 5 and sodium 135  His legs and hands are painful  He gets cramping  He is on atorvastatin; He is on valsartan and that will be changed  He was in er on 7/2 due to muscluloskeletal chest pain      Review of Systems   Constitutional: Negative  HENT: Negative  Eyes: Negative  Respiratory: Positive for shortness of breath  Cardiovascular: Positive for chest pain  Gastrointestinal: Negative  Endocrine: Negative  Genitourinary: Negative  Musculoskeletal: Positive for arthralgias, gait problem and myalgias  Allergic/Immunologic: Negative  Neurological: Negative for dizziness, speech difficulty, numbness and headaches  Hematological: Negative  Psychiatric/Behavioral: Negative  I have reviewed the patient's PMH, Social History, Medication List and Allergies  OBJECTIVE:  /64 (BP Location: Left arm, Patient Position: Sitting, Cuff Size: Large)   Pulse 56   Temp 98 3 °F (36 8 °C)   Ht 5' 7" (1 702 m)   Wt 90 7 kg (200 lb)   SpO2 98%   BMI 31 32 kg/m²   Physical Exam   Constitutional: He is oriented to person, place, and time  He appears well-developed and well-nourished  HENT:   Head: Normocephalic and atraumatic  Right Ear: External ear normal    Left Ear: External ear normal    Nose: Nose normal    Mouth/Throat: Oropharynx is clear and moist    Hard of hearing   Eyes: Conjunctivae and EOM are normal  Pupils are equal, round, and reactive to light  Neck: Normal range of motion  Neck supple  No tracheal deviation present  No thyromegaly present  Cardiovascular: Normal rate, regular rhythm, normal heart sounds and intact distal pulses  Pulmonary/Chest: Effort normal and breath sounds normal  No respiratory distress  He has no wheezes  Abdominal: Soft  Bowel sounds are normal    Musculoskeletal: Normal range of motion  He exhibits no edema or tenderness  somoe loss of sensation distally lower extremities   Neurological: He is alert and oriented to person, place, and time  He has normal reflexes  No cranial nerve deficit  Coordination normal    Skin: Skin is dry  Psychiatric: His behavior is normal  Judgment and thought content normal    Anxious male;  mmse stable  Nursing note and vitals reviewed

## 2018-07-23 NOTE — PATIENT INSTRUCTIONS
We need to stop valsartan- it has a cancer causing agent in it- I am going to change it to olmesartan 40 mg  Stop cholesterol medicaion for  A week-  See if it helps with the cramping of hands and legs  Update me in a week; if better we can start back on every other day     Labs in 5 months - slip provided

## 2018-07-23 NOTE — ASSESSMENT & PLAN NOTE
Follow up with cardiology  Change valsartan to olmesartan 40 mg  Continue imdur  Hold statin for a week and see if cramping gets better- update me reese  Week- if better we can restart at every other day    On plavix and beta blocker

## 2018-07-23 NOTE — ASSESSMENT & PLAN NOTE
Lab Results   Component Value Date    HGBA1C 6 6 (H) 01/18/2018       No results for input(s): POCGLU in the last 72 hours      Blood Sugar Average: Last 72 hrs:

## 2018-07-25 DIAGNOSIS — I10 ESSENTIAL HYPERTENSION: Primary | ICD-10-CM

## 2018-07-25 RX ORDER — AMLODIPINE BESYLATE 5 MG/1
5 TABLET ORAL DAILY
Qty: 90 TABLET | Refills: 3 | Status: SHIPPED | OUTPATIENT
Start: 2018-07-25

## 2018-07-25 NOTE — TELEPHONE ENCOUNTER
Preston Ding calling to ask if you would prescribe the pts amlodipine 5mg   It was originally prescribed while he was in the hospital by Dr Elba Hector and she states it would be easier if you could prescribe it because he needs a refill and she cant get through to that doctor because the phones are down

## 2018-08-01 ENCOUNTER — TELEPHONE (OUTPATIENT)
Dept: FAMILY MEDICINE CLINIC | Facility: CLINIC | Age: 73
End: 2018-08-01

## 2018-08-01 NOTE — TELEPHONE ENCOUNTER
Candelaria Riojas so go back on the atorvastatin and add magnesium 250 mg once a day for a week and arleen mcnulty know

## 2018-08-01 NOTE — TELEPHONE ENCOUNTER
Patient's daughter called   He has been off of atorvastatin x 1 week to see if would help with his leg cramping and his hands locking up and he has had no improvement, please advise

## 2018-08-03 LAB — HBA1C MFR BLD HPLC: 6.5 %

## 2018-08-10 ENCOUNTER — OFFICE VISIT (OUTPATIENT)
Dept: FAMILY MEDICINE CLINIC | Facility: CLINIC | Age: 73
End: 2018-08-10
Payer: MEDICARE

## 2018-08-10 ENCOUNTER — TRANSCRIBE ORDERS (OUTPATIENT)
Dept: LAB | Facility: CLINIC | Age: 73
End: 2018-08-10

## 2018-08-10 ENCOUNTER — TELEPHONE (OUTPATIENT)
Dept: FAMILY MEDICINE CLINIC | Facility: CLINIC | Age: 73
End: 2018-08-10

## 2018-08-10 ENCOUNTER — APPOINTMENT (OUTPATIENT)
Dept: RADIOLOGY | Facility: CLINIC | Age: 73
End: 2018-08-10
Payer: MEDICARE

## 2018-08-10 VITALS
TEMPERATURE: 97.4 F | BODY MASS INDEX: 31.32 KG/M2 | HEART RATE: 66 BPM | RESPIRATION RATE: 18 BRPM | SYSTOLIC BLOOD PRESSURE: 106 MMHG | DIASTOLIC BLOOD PRESSURE: 58 MMHG | WEIGHT: 200 LBS

## 2018-08-10 DIAGNOSIS — T17.908A ASPIRATION INTO AIRWAY, INITIAL ENCOUNTER: Primary | ICD-10-CM

## 2018-08-10 DIAGNOSIS — T17.908A ASPIRATION INTO AIRWAY, INITIAL ENCOUNTER: ICD-10-CM

## 2018-08-10 DIAGNOSIS — K21.9 GERD WITHOUT ESOPHAGITIS: ICD-10-CM

## 2018-08-10 DIAGNOSIS — R05.9 COUGH: ICD-10-CM

## 2018-08-10 PROCEDURE — 99214 OFFICE O/P EST MOD 30 MIN: CPT | Performed by: FAMILY MEDICINE

## 2018-08-10 PROCEDURE — 71046 X-RAY EXAM CHEST 2 VIEWS: CPT

## 2018-08-10 RX ORDER — ALUMINA, MAGNESIA, AND SIMETHICONE 2400; 2400; 240 MG/30ML; MG/30ML; MG/30ML
10 SUSPENSION ORAL EVERY 6 HOURS PRN
Qty: 355 ML | Refills: 0 | Status: SHIPPED | OUTPATIENT
Start: 2018-08-10 | End: 2020-10-09 | Stop reason: ALTCHOICE

## 2018-08-10 RX ORDER — ALBUTEROL SULFATE 90 UG/1
2 AEROSOL, METERED RESPIRATORY (INHALATION) EVERY 6 HOURS PRN
Qty: 8.5 G | Refills: 0 | Status: SHIPPED | OUTPATIENT
Start: 2018-08-10 | End: 2018-09-17 | Stop reason: SDUPTHER

## 2018-08-10 NOTE — TELEPHONE ENCOUNTER
Patient's wife notified and stated understanding  Questioned how long patient should take Maalox Max, per  verbal instructions advised over the weekend and then PRN

## 2018-08-10 NOTE — PROGRESS NOTES
FAMILY MEDICINE PROGRESS NOTE  Mitch Verma 67 y o  male   DATE: August 10, 2018     ASSESSMENT and PLAN:  Mitch Verma is a 67 y o  male with:     1  Aspiration into airway, initial encounter  Given lung findings concern for aspiration after regurgitation episode  If present, likely aspiration pneumonitis at this point  STAT CXR to evaluate  Albuterol PRN  May need Abx, will call with results  - XR chest pa & lateral; Future  - albuterol (PROAIR HFA) 90 mcg/act inhaler; Inhale 2 puffs every 6 (six) hours as needed for wheezing  Dispense: 8 5 g; Refill: 0    2  Cough  Coughing fits likely related to aspiration episode given sudden onset and no other symptoms  Can add Tessalon perles PRN pending CXR results    3  GERD without esophagitis  Already on Pantoprazole 40mg daily and Zantac 300mg qHS, confirmed by MA with pharmacy  Add Maalox Max suspension, may also add viscous lidocaine if doesn't improve  - aluminum-magnesium hydroxide-simethicone (MAALOX MAX) 400-400-40 MG/5ML suspension; Take 10 mL by mouth every 6 (six) hours as needed for indigestion or heartburn  Dispense: 355 mL; Refill: 0      SUBJECTIVE:  Mitch Verma is a 67 y o  male who presents today with a chief complaint of Cough and Heartburn  Pt states he was in his normal state of health this morning and then went to the bank and felt fine, had cocoa puffs this morning, then went back into his car and all of a sudden felt like he had regurgitated sputum and went into a coughing fit that lasted 30 minutes  Everytime he coughed it felt like it was burning in his esophagus  He reheated old coffee and that helped it subside a little bit  Denies sharp chest pain, lightheaded, diaphoretic, MAY, nausea, vomiting, fevers, chills  Feels a little dizzy  Pt has ahistory of GERD and doesn't know his meds off the top of his head, he gets them bubble packed by the pharmacy  Review of Systems   Constitutional: Negative for chills and fever  See HPI for ROS   Respiratory: Positive for cough  Negative for chest tightness (burning with cough) and shortness of breath  Cardiovascular: Negative for chest pain and palpitations  Gastrointestinal: Negative for abdominal pain, constipation, diarrhea and nausea  I have reviewed the patient's PMH, Social History, Medication List and Allergies as appropriate  OBJECTIVE:  /58   Pulse 66   Temp (!) 97 4 °F (36 3 °C)   Resp 18   Wt 90 7 kg (200 lb)   BMI 31 32 kg/m²    Physical Exam   Constitutional: He appears well-developed and well-nourished  No distress  HENT:   Head: Normocephalic and atraumatic  Cardiovascular: Normal rate, regular rhythm and normal heart sounds  No murmur heard  Pulmonary/Chest: Effort normal  No respiratory distress (cough)  He has decreased breath sounds in the right middle field and the left middle field  He has wheezes (diffuse expiratory wheezing, decreased BS at bases)  He has no rhonchi  He has no rales  He exhibits no tenderness  Abdominal: Soft  Normal appearance and bowel sounds are normal  He exhibits no distension  There is no tenderness (epigastric)  Neurological: He is alert  Skin: He is not diaphoretic  Vitals reviewed  Guerrero Rodas MD       Note: Portions of the record may have been created with voice recognition software  Occasional wrong word or "sound a like" substitutions may have occurred due to the inherent limitations of voice recognition software  Read the chart carefully and recognize, using context, where substitutions have occurred

## 2018-08-10 NOTE — TELEPHONE ENCOUNTER
Please call Rena Gardiner and let him know that his CXR did not show any PNA, continue the plan as we had discussed  Thank you!

## 2018-08-21 DIAGNOSIS — R25.9 MIXED ACTION AND RESTING TREMOR: ICD-10-CM

## 2018-08-23 RX ORDER — TOPIRAMATE 25 MG/1
TABLET ORAL
Qty: 60 TABLET | Refills: 3 | Status: SHIPPED | OUTPATIENT
Start: 2018-08-23 | End: 2018-09-04 | Stop reason: ALTCHOICE

## 2018-08-31 DIAGNOSIS — R41.89 COGNITIVE CHANGES: ICD-10-CM

## 2018-08-31 RX ORDER — DONEPEZIL HYDROCHLORIDE 10 MG/1
TABLET, FILM COATED ORAL
Qty: 30 TABLET | Refills: 0 | Status: SHIPPED | OUTPATIENT
Start: 2018-08-31 | End: 2018-12-18 | Stop reason: SDUPTHER

## 2018-09-04 ENCOUNTER — OFFICE VISIT (OUTPATIENT)
Dept: FAMILY MEDICINE CLINIC | Facility: CLINIC | Age: 73
End: 2018-09-04
Payer: MEDICARE

## 2018-09-04 VITALS
OXYGEN SATURATION: 93 % | RESPIRATION RATE: 18 BRPM | TEMPERATURE: 97.9 F | BODY MASS INDEX: 31.48 KG/M2 | WEIGHT: 201 LBS | SYSTOLIC BLOOD PRESSURE: 118 MMHG | HEART RATE: 78 BPM | DIASTOLIC BLOOD PRESSURE: 60 MMHG

## 2018-09-04 DIAGNOSIS — J40 BRONCHITIS: Primary | ICD-10-CM

## 2018-09-04 PROCEDURE — 99214 OFFICE O/P EST MOD 30 MIN: CPT | Performed by: FAMILY MEDICINE

## 2018-09-04 RX ORDER — AZITHROMYCIN 250 MG/1
TABLET, FILM COATED ORAL
Qty: 6 TABLET | Refills: 0 | Status: SHIPPED | OUTPATIENT
Start: 2018-09-04 | End: 2018-09-09

## 2018-09-04 RX ORDER — BENZONATATE 100 MG/1
100 CAPSULE ORAL 3 TIMES DAILY PRN
Qty: 30 CAPSULE | Refills: 0 | Status: SHIPPED | OUTPATIENT
Start: 2018-09-04 | End: 2018-09-14

## 2018-09-04 NOTE — PATIENT INSTRUCTIONS
-Start taking Mucinex DM 1200mg twice a day  -Drink at least 10 glasses of water per day  -Use intranasal saline  -Honey has been shown to help with coughs  -You can use Tylenol as needed for fevers  -Practice good hygiene and cover your mouth if you cough  -Call us back if you have new or worsening fevers and other symptoms    Acute Bronchitis   AMBULATORY CARE:   Acute bronchitis  is swelling and irritation in the air passages of your lungs  This irritation may cause you to cough or have other breathing problems  Acute bronchitis often starts because of another illness, such as a cold or the flu  The illness spreads from your nose and throat to your windpipe and airways  Bronchitis is often called a chest cold  Acute bronchitis lasts about 3 to 6 weeks and is usually not a serious illness  Your cough can last for several weeks  You may have any of the following symptoms:   · A cough with sputum that may be clear, yellow, or green    · Feeling more tired than usual, and body aches    · A fever and chills    · Wheezing when you breathe    · A tight chest or pain when you breathe or cough  Seek care immediately if:   · You cough up blood  · Your lips or fingernails turn blue  · You feel like you are not getting enough air when you breathe  Contact your healthcare provider if:   · You have a fever  · Your breathing problems do not go away or get worse  · Your cough does not get better within 4 weeks  · You have questions or concerns about your condition or care  Self-care:   · Get more rest   Rest helps your body to heal  Slowly start to do more each day  Rest when you feel it is needed  · Avoid irritants in the air  Avoid chemicals, fumes, and dust  Wear a face mask if you must work around dust or fumes  Stay inside on days when air pollution levels are high  If you have allergies, stay inside when pollen counts are high   Do not use aerosol products, such as spray-on deodorant, bug spray, and hair spray  · Do not smoke or be around others who smoke  Nicotine and other chemicals in cigarettes and cigars damages the cilia that move mucus out of your lungs  Ask your healthcare provider for information if you currently smoke and need help to quit  E-cigarettes or smokeless tobacco still contain nicotine  Talk to your healthcare provider before you use these products  · Drink liquids as directed  Liquids help keep your air passages moist and help you cough up mucus  You may need to drink more liquids when you have acute bronchitis  Ask how much liquid to drink each day and which liquids are best for you  · Use a humidifier or vaporizer  Use a cool mist humidifier or a vaporizer to increase air moisture in your home  This may make it easier for you to breathe and help decrease your cough  Prevent acute bronchitis by doing the following:   · Get the vaccinations you need  Ask your healthcare provider if you should get vaccinated against the flu or pneumonia  · Prevent the spread of germs  You can decrease your risk of acute bronchitis and other illnesses by doing the following:     [facility] your hands often with soap and water  Carry germ-killing hand lotion or gel with you  You can use the lotion or gel to clean your hands when soap and water are not available  ¨ Do not touch your eyes, nose, or mouth unless you have washed your hands first     ¨ Always cover your mouth when you cough to prevent the spread of germs  It is best to cough into a tissue or your shirt sleeve instead of into your hand  Ask those around you cover their mouths when they cough  ¨ Try to avoid people who have a cold or the flu  If you are sick, stay away from others as much as possible  Medicines: Your healthcare provider may  give you any of the following:  · Ibuprofen or acetaminophen  are medicines that help lower your fever  They are available without a doctor's order   Ask your healthcare provider which medicine is right for you  Ask how much to take and how often to take it  Follow directions  These medicines can cause stomach bleeding if not taken correctly  Ibuprofen can cause kidney damage  Do not take ibuprofen if you have kidney disease, an ulcer, or allergies to aspirin  Acetaminophen can cause liver damage  Do not take more than 4,000 milligrams in 24 hours  · Decongestants  help loosen mucus in your lungs and make it easier to cough up  This can help you breathe easier  · Cough suppressants  decrease your urge to cough  If your cough produces mucus, do not take a cough suppressant unless your healthcare provider tells you to  Your healthcare provider may suggest that you take a cough suppressant at night so you can rest     · Inhalers  may be given  Your healthcare provider may give you one or more inhalers to help you breathe easier and cough less  An inhaler gives your medicine to open your airways  Ask your healthcare provider to show you how to use your inhaler correctly  Follow up with your healthcare provider as directed:  Write down questions you have so you will remember to ask them during your follow-up visits  © 2017 [facility] Information is for End User's use only and may not be sold, redistributed or otherwise used for commercial purposes  All illustrations and images included in CareNotes® are the copyrighted property of [employer]  or [employer]  The above information is an  only  It is not intended as medical advice for individual conditions or treatments  Talk to your doctor, nurse or pharmacist before following any medical regimen to see if it is safe and effective for you

## 2018-09-04 NOTE — PROGRESS NOTES
FAMILY MEDICINE PROGRESS NOTE  Nya Foster 68 y o  male   DATE: September 4, 2018     ASSESSMENT and PLAN:  Nya Foster is a 68 y o  male with:     1  Bronchitis  Pt had an aspiration event on 8/10/18 and normal CXR that day, but have developed into a PNA given persistence of cough since then  At least has bronchitis, will treat empirically for PNA with ZPak and add Mucinex DM and Tessalon PRN, continue Albuterol PRN with spacer  If symptoms persists, can add codeine cough syrup and check repeat CXR    - azithromycin (ZITHROMAX) 250 mg tablet; Take 2 tabs PO on day 1, then 1 tab PO qday x 4 days for a total of 6 pills over 5 days  Dispense: 6 tablet; Refill: 0  - Spacer Device for Inhaler  - benzonatate (TESSALON PERLES) 100 mg capsule; Take 1 capsule (100 mg total) by mouth 3 (three) times a day as needed for cough for up to 10 days  Dispense: 30 capsule; Refill: 0    Patient agreeable with the plan and expressed understanding  I discucssed signs and symptoms for which to RTC, go to ER or seek urgent medical care  SUBJECTIVE:  Nya Foster is a 68 y o  male who presents today with a chief complaint of Cough and Shortness of Breath  Since August 10 when he had a supposed aspiration event, he had a normal CXR, but he has had a persistent hacking cough, occ productive of yellow/brown sputum, but sometimes white  No hemoptysis  He gets dizzy with the coughing when it is bad  The cough is getting worse, isnt really using his albuterol inhaler  Some days it is a lot worse, other days not so bad  Took Nyquil/Dayquil for one day, but the box said check with a phyisician, so didn't want to take more of it  Sick contacts: daughter and son with similar symptoms  URI    This is a new problem  The current episode started 1 to 4 weeks ago  The problem has been unchanged  There has been no fever   Associated symptoms include congestion, coughing, diarrhea (a couple of episodes), rhinorrhea, sneezing and a sore throat  Pertinent negatives include no abdominal pain, chest pain, ear pain, nausea, sinus pain or vomiting  He has tried inhaler use for the symptoms  The treatment provided no relief  Review of Systems   Constitutional: Negative for chills and fever  HENT: Positive for congestion, rhinorrhea, sneezing and sore throat  Negative for ear pain, sinus pain and sinus pressure  Eyes: Negative for discharge  Respiratory: Positive for cough and shortness of breath  Negative for chest tightness  Cardiovascular: Negative for chest pain and palpitations  Gastrointestinal: Positive for diarrhea (a couple of episodes)  Negative for abdominal pain, nausea and vomiting  Neurological: Positive for dizziness  I have reviewed the patient's PMH, Social History, Medication List and Allergies as appropriate  OBJECTIVE:  /60   Pulse 78   Temp 97 9 °F (36 6 °C)   Resp 18   Wt 91 2 kg (201 lb)   SpO2 93%   BMI 31 48 kg/m²    Physical Exam   Constitutional: He appears well-developed and well-nourished  No distress  HENT:   Head: Normocephalic and atraumatic  Right Ear: External ear normal    Left Ear: External ear normal    Nose: Right sinus exhibits no maxillary sinus tenderness and no frontal sinus tenderness  Left sinus exhibits no maxillary sinus tenderness and no frontal sinus tenderness  Mouth/Throat: Uvula is midline, oropharynx is clear and moist and mucous membranes are normal  No oropharyngeal exudate, posterior oropharyngeal edema, posterior oropharyngeal erythema or tonsillar abscesses  Eyes: Conjunctivae are normal  Right eye exhibits no discharge  Left eye exhibits no discharge  Neck: Normal range of motion  Neck supple  Cardiovascular: Normal rate and normal heart sounds  Pulmonary/Chest: Effort normal  No respiratory distress (dry, hacking cough)  He has decreased breath sounds in the right lower field and the left lower field   He has wheezes in the right upper field, the right middle field, the right lower field, the left upper field, the left middle field and the left lower field  He has rhonchi in the right middle field, the right lower field, the left middle field and the left lower field  He has no rales  Lymphadenopathy:     He has no cervical adenopathy  Skin: He is not diaphoretic  Vitals reviewed  Jacklyn Sherwood MD    Note: Portions of the record may have been created with voice recognition software  Occasional wrong word or "sound a like" substitutions may have occurred due to the inherent limitations of voice recognition software  Read the chart carefully and recognize, using context, where substitutions have occurred

## 2018-09-17 ENCOUNTER — OFFICE VISIT (OUTPATIENT)
Dept: FAMILY MEDICINE CLINIC | Facility: CLINIC | Age: 73
End: 2018-09-17
Payer: MEDICARE

## 2018-09-17 ENCOUNTER — APPOINTMENT (OUTPATIENT)
Dept: RADIOLOGY | Facility: CLINIC | Age: 73
End: 2018-09-17
Payer: MEDICARE

## 2018-09-17 ENCOUNTER — TRANSCRIBE ORDERS (OUTPATIENT)
Dept: RADIOLOGY | Facility: CLINIC | Age: 73
End: 2018-09-17

## 2018-09-17 VITALS
HEART RATE: 68 BPM | HEIGHT: 67 IN | TEMPERATURE: 97.6 F | WEIGHT: 204.2 LBS | SYSTOLIC BLOOD PRESSURE: 122 MMHG | OXYGEN SATURATION: 95 % | RESPIRATION RATE: 14 BRPM | DIASTOLIC BLOOD PRESSURE: 64 MMHG | BODY MASS INDEX: 32.05 KG/M2

## 2018-09-17 DIAGNOSIS — T17.908A ASPIRATION INTO AIRWAY, INITIAL ENCOUNTER: ICD-10-CM

## 2018-09-17 DIAGNOSIS — M94.0 COSTOCHONDRITIS: ICD-10-CM

## 2018-09-17 DIAGNOSIS — J98.01 BRONCHOSPASM: ICD-10-CM

## 2018-09-17 DIAGNOSIS — J98.01 BRONCHOSPASM: Primary | ICD-10-CM

## 2018-09-17 PROCEDURE — 71046 X-RAY EXAM CHEST 2 VIEWS: CPT

## 2018-09-17 PROCEDURE — 99214 OFFICE O/P EST MOD 30 MIN: CPT | Performed by: FAMILY MEDICINE

## 2018-09-17 RX ORDER — PREDNISONE 20 MG/1
20 TABLET ORAL 2 TIMES DAILY WITH MEALS
Qty: 10 TABLET | Refills: 0 | Status: SHIPPED | OUTPATIENT
Start: 2018-09-17 | End: 2018-09-22

## 2018-09-17 RX ORDER — GUAIFENESIN AND CODEINE PHOSPHATE 100; 10 MG/5ML; MG/5ML
5 SOLUTION ORAL 3 TIMES DAILY PRN
Qty: 120 ML | Refills: 0 | Status: SHIPPED | OUTPATIENT
Start: 2018-09-17 | End: 2018-10-11

## 2018-09-17 RX ORDER — ALBUTEROL SULFATE 90 UG/1
2 AEROSOL, METERED RESPIRATORY (INHALATION) EVERY 6 HOURS PRN
Qty: 8.5 G | Refills: 0 | Status: SHIPPED | OUTPATIENT
Start: 2018-09-17 | End: 2020-10-09 | Stop reason: ALTCHOICE

## 2018-09-17 NOTE — PROGRESS NOTES
FAMILY MEDICINE PROGRESS NOTE  Gary Aase 68 y o  male   DATE: September 17, 2018     ASSESSMENT and PLAN:  Gary Aase is a 68 y o  male with:     1  Bronchospasm  Patient initially seen on 08/10 following aspiration event, chest x-ray done at that time was negative  Again patient was seen 2 weeks ago, diagnosed with bronchitis and given an empiric Z-Keshawn and Tessalon Perles, symptoms improved while on antibiotics, but recurred after stopping the antibiotics  Patient has persistent coughing with mild chills, afebrile today no hypoxemia  On lung exam, patient has poor air movement at bases, slightly decreased breath sounds, diffuse wheezing and rhonchi  Patient does not have a history of asthma or COPD, was a former smoker  Will treat as a bronchospastic event prednisone burst,   Albuterol p r n , codeine cough syrup, will get chest x-ray today to rule out underlying pneumonia  If present would add fluoroquinolone  Get PFTs once patient is at baseline to establish if he has underlying lung disease   - predniSONE 20 mg tablet; Take 1 tablet (20 mg total) by mouth 2 (two) times a day with meals for 5 days  Dispense: 10 tablet; Refill: 0  - guaifenesin-codeine (GUAIFENESIN AC) 100-10 MG/5ML liquid; Take 5 mL by mouth 3 (three) times a day as needed for cough  Dispense: 120 mL; Refill: 0  Representatives have queried the patient's controlled substance dispensing history in the Prescription Drug Monitoring Program in compliance with regulations before I have prescribed any controlled substances  The prescription history is consistent with prescribed therapy and our practice policies  - albuterol (PROAIR HFA) 90 mcg/act inhaler; Inhale 2 puffs every 6 (six) hours as needed for wheezing  Dispense: 8 5 g; Refill: 0  - XR chest pa & lateral; Future    2   Costochondritis   patient's chest tightness is likely related to costochondritis given his acute illness, pain is only present with coughing, not at baseline  Less likely PE given that he has normal oxygenation  If symptoms persist, given his cardiac history, would potentially need ER workup  Reviewed with the patient and family at length signs or symptoms for which to seek urgent or emergent care  RTC PRN    SUBJECTIVE:  Gretchen Silverio is a 68 y o  male who presents today with a chief complaint of Cough and Chest Pain  Pt had an aspiration event on 8/10/18 and normal CXR that day  Pt seen again 1 5 weeks ago on 9/4 and started empirically on Z-Keshawn with Albuterol PRN, but symptoms have persisted  Initially felt better while on his Abx  He has been having really bad coughing and it is making him dizzy  He is coughing up mucous, but occ gets stuck in his throat/chest  Has been taking Mucinex, but was doing Mucinex DM now doing Mucinex D  Rare wheezing, feels SOB with exertion and rest  Alternating hot/cold  Is doing albuterol BID, which gives him a tickling cough  Former smoker, quit in 1972, no previous history of COPD/Asthma        Review of Systems   Constitutional: Positive for chills and fatigue  Negative for activity change and fever  HENT: Positive for congestion  Negative for ear pain, rhinorrhea, sinus pain, sinus pressure, sneezing and sore throat  Eyes: Negative for discharge  Respiratory: Positive for cough, chest tightness and shortness of breath  Cardiovascular: Negative for chest pain and palpitations  Gastrointestinal: Negative for abdominal pain, diarrhea (chronic), nausea and vomiting  I have reviewed the patient's PMH, Social History, Medication List and Allergies as appropriate  OBJECTIVE:  /64 (BP Location: Left arm, Patient Position: Sitting, Cuff Size: Large)   Pulse 68   Temp 97 6 °F (36 4 °C)   Resp 14   Ht 5' 7" (1 702 m)   Wt 92 6 kg (204 lb 3 2 oz)   SpO2 95%   BMI 31 98 kg/m²    Physical Exam   Constitutional: He appears well-developed and well-nourished  No distress     HENT:   Head: Normocephalic and atraumatic  Right Ear: External ear normal    Left Ear: External ear normal    Nose: Right sinus exhibits no maxillary sinus tenderness and no frontal sinus tenderness  Left sinus exhibits no maxillary sinus tenderness and no frontal sinus tenderness  Mouth/Throat: Uvula is midline, oropharynx is clear and moist and mucous membranes are normal  No oropharyngeal exudate, posterior oropharyngeal edema, posterior oropharyngeal erythema or tonsillar abscesses  Eyes: Conjunctivae are normal  Right eye exhibits no discharge  Left eye exhibits no discharge  Neck: Normal range of motion  Neck supple  Cardiovascular: Normal rate and normal heart sounds  Pulmonary/Chest: He is in respiratory distress (hacking cough)  He has decreased breath sounds in the right lower field and the left lower field  He has wheezes in the right upper field, the right middle field, the right lower field, the left upper field, the left middle field and the left lower field  He has rhonchi in the right middle field, the right lower field, the left middle field and the left lower field  He has no rales  Lymphadenopathy:     He has no cervical adenopathy  Skin: He is not diaphoretic  Vitals reviewed  Sanjiv César Alexandre MD    Note: Portions of the record may have been created with voice recognition software  Occasional wrong word or "sound a like" substitutions may have occurred due to the inherent limitations of voice recognition software  Read the chart carefully and recognize, using context, where substitutions have occurred

## 2018-09-17 NOTE — PATIENT INSTRUCTIONS
-Start taking Mucinex DM 1200mg twice a day  -Take anti-inflammatories such as Naproxen (Aleve) or Ibuprofen (Motrin, Advil) to help with aches, pains, and swelling  -Drink at least 8-10 glasses of water per day  -Use intranasal saline  -Honey has been shown to help with coughs  -You can use Tylenol as needed for fevers  -Practice good hygiene and cover your mouth if you cough  -Call us back if you have new or worsening fevers and other symptoms as discussed    Bronchospasm   WHAT YOU NEED TO KNOW:   Bronchospasm is a narrowing of the airway that usually comes and goes  You may be at risk for bronchospasm if you have a chest cold or allergies  You may also be at risk if you are bothered by air pollution, certain medicines, cold, dry air, smoke, or strong odors  Exercise may worsen your symptoms  Bronchospasms may make it hard for you to breathe  DISCHARGE INSTRUCTIONS:   Medicines: You may need any of the following:  · Bronchodilators  help expand your airway for easier breathing  Some of these medicines may help prevent future spasms  · Inhaled steroids  help reduce swelling in your airway and soothe your breathing  These are used for long-term control  · Anticholinergics  help relax and open your airway  · Take your medicine as directed  Contact your healthcare provider if you think your medicine is not helping or if you have side effects  Tell him or her if you are allergic to any medicine  Keep a list of the medicines, vitamins, and herbs you take  Include the amounts, and when and why you take them  Bring the list or the pill bottles to follow-up visits  Carry your medicine list with you in case of an emergency  Follow up with your healthcare provider as directed: You may need more testing to find the cause of your condition  Write down your questions so you remember to ask them during your visits  Self-care:   · Avoid triggers  · Warm up before you exercise   Ask your healthcare provider about the best exercise plan for you  · Try to avoid people who are sick  Ask your healthcare provider if you need a flu or pneumonia vaccine  · Breathe through your nose when you are in cold, dry air or weather  This may help reduce lung irritation by warming the air before it reaches your lungs  Contact your healthcare provider if:   · You have a fever  · You have a cough that will not go away  · Your wheezing worsens  · You have questions or concerns about your condition or care  Seek care immediately or call 911 if:   · You cough or spit up blood  · You are short of breath  · You have blue fingernails or toenails  · You have chest pain  · You have a fast or uneven heartbeat  © 2017 [facility] Information is for End User's use only and may not be sold, redistributed or otherwise used for commercial purposes  All illustrations and images included in CareNotes® are the copyrighted property of [employer]  or [employer]  The above information is an  only  It is not intended as medical advice for individual conditions or treatments  Talk to your doctor, nurse or pharmacist before following any medical regimen to see if it is safe and effective for you

## 2018-09-20 ENCOUNTER — TELEPHONE (OUTPATIENT)
Dept: FAMILY MEDICINE CLINIC | Facility: CLINIC | Age: 73
End: 2018-09-20

## 2018-09-20 NOTE — TELEPHONE ENCOUNTER
Please call pt, CXR negative for PNA, once he is back to baseline, he should give us a call, I would like him to get PFTs done  Thanks

## 2018-10-11 ENCOUNTER — OFFICE VISIT (OUTPATIENT)
Dept: FAMILY MEDICINE CLINIC | Facility: OTHER | Age: 73
End: 2018-10-11
Payer: MEDICARE

## 2018-10-11 VITALS
OXYGEN SATURATION: 98 % | HEIGHT: 67 IN | SYSTOLIC BLOOD PRESSURE: 116 MMHG | DIASTOLIC BLOOD PRESSURE: 78 MMHG | WEIGHT: 207.19 LBS | HEART RATE: 65 BPM | TEMPERATURE: 99 F | BODY MASS INDEX: 32.52 KG/M2

## 2018-10-11 DIAGNOSIS — R13.10 DYSPHAGIA, UNSPECIFIED TYPE: ICD-10-CM

## 2018-10-11 DIAGNOSIS — R10.13 EPIGASTRIC PAIN: Primary | ICD-10-CM

## 2018-10-11 DIAGNOSIS — K21.9 CHRONIC GERD: ICD-10-CM

## 2018-10-11 DIAGNOSIS — R06.02 SHORTNESS OF BREATH: ICD-10-CM

## 2018-10-11 PROCEDURE — 99214 OFFICE O/P EST MOD 30 MIN: CPT | Performed by: NURSE PRACTITIONER

## 2018-10-11 NOTE — PROGRESS NOTES
Assessment/Plan:       Problem List Items Addressed This Visit     Upper mid epigastric pain + nausea + dysphagia + dyspnea in patient with history of GERD, hiatal hernia, CAD/MI, thoracic AA, Type 2 diabetes  --Distant smoker  Denies history of asthma, COPD  --Normal CXR 3 weeks ago, but given faint bilateral crackles on exam and ongoing dyspnea, will repeat at this time  --Screening labs + abdominal ultrasound  --Dietary measures encouraged: Avoid heavy meals, greasy/spicy/acidic, eating within 3 hours of bedtime  No alcohol  --Continue PPI + H2-blocker  Consider trial of OTC antacid in addition    --GI f/u  Would likely benefit from EGD  May also benefit from swallowing evaluation given his dysphagia and recent aspiration event  --Recent EKG, echocardiogram showing no acute changes  Followed by [facility] cardiology  --Consider PFT's, CTA chest pending above results  --Advise ER/911 for worsening/red flag symptoms (reviewed with patient_    Relevant Orders    US abdomen complete    Ambulatory referral to Gastroenterology    XR chest pa & lateral    CBC and differential    Comprehensive metabolic panel            Subjective:      Patient ID: Hong Solano is a 68 y o  male  [facility] patient  Here for complaints of upper mid-epigastric pain, nausea, shortness of breath x 6-8 weeks  Pain is described as aching  Radiates partially to either side, but not to chest, back, or elsewhere in abdomen  Protonix seems to help some  No variation with eating, position, exertion as far as he can tell  Associated bloating, early satiety, dysphagia  No vomiting, stool changes, weight loss  Normal appetite  Intermittent mild cough productive of yellow sputum  No post nasal drip  Mild wheezing at times  No fever  Intermittent dizziness  Gets calf cramps at times, but no acute pain or swelling  Admits that his diet "could be better"  1-2 cups of coffee per day  No alcohol x years     Quit smoking 1972    Denies (known) environmental triggers  PMH notable for GERD, hiatal hernia (has never seen GI)  Denies cholecystectomy, other GI surgeries  Also notable for CAD, MI, thoracic AA  Saw cardiologist ([facility]) 9/12/18  EKG showing no changes from baseline  Echo with moderate AS, EF 60%  No repeat stress test ordered  CXR 9/17/18 normal   No recent PFT's  Seen by PCP (Dr Cookie Jackson) earlier that day  Diagnosed with bronchospasm and costochondritis  Given inhaler and prednisone  Apparently had aspiration event 8/18/18  The following portions of the patient's history were reviewed and updated as appropriate: current medications, past family history, past medical history, past social history, past surgical history and problem list     Review of Systems   Constitutional: Negative for fever  HENT: Negative for rhinorrhea and sore throat  Respiratory: Positive for cough, shortness of breath and wheezing  Cardiovascular: Negative for chest pain and palpitations  Gastrointestinal: Positive for abdominal pain and nausea  Negative for blood in stool, constipation, diarrhea and vomiting  Genitourinary: Negative for difficulty urinating  Musculoskeletal: Negative for myalgias  Skin: Negative for rash  Neurological: Positive for dizziness  Negative for headaches  Psychiatric/Behavioral: Negative for confusion  Objective:      /78 (BP Location: Right arm, Patient Position: Sitting, Cuff Size: Adult)   Pulse 65   Temp 99 °F (37 2 °C) (Tympanic)   Ht 5' 7" (1 702 m)   Wt 94 kg (207 lb 3 oz)   SpO2 98%   BMI 32 45 kg/m²          Physical Exam   Constitutional: He is oriented to person, place, and time  He appears well-developed and well-nourished  No distress  HENT:   Head: Normocephalic and atraumatic     Right Ear: External ear normal    Left Ear: External ear normal    Nose: Nose normal    Mouth/Throat: Oropharynx is clear and moist    Eyes: Pupils are equal, round, and reactive to light  Conjunctivae are normal    Neck: Normal range of motion  Neck supple  No thyromegaly present  Cardiovascular: Normal rate, regular rhythm, S1 normal and S2 normal     Murmur heard  Systolic murmur is present with a grade of 1/6   Pulmonary/Chest: Effort normal    Breathing non-labored  RR=18  No coughing noted  Faint bibasilar crackles, L>R  No chest wall tenderness  Abdominal: Soft  Bowel sounds are normal  He exhibits distension  He exhibits no mass  There is tenderness  There is no rebound and no guarding  Abdomen firm, mild/moderate distended, with mild upper mid epigastric and RUQ tenderness  Nontender elsewhere  No palpable mass, HSM  Negative Oscar  Mild, non-tender, reducible umbilical hernia  No scarring  No CVA tenderness  Musculoskeletal: He exhibits no edema  No calf pain/swelling/tenderness  Neurological: He is alert and oriented to person, place, and time  He has normal reflexes  Skin: Skin is warm and dry  He is not diaphoretic  Psychiatric: He has a normal mood and affect

## 2018-10-12 ENCOUNTER — TRANSCRIBE ORDERS (OUTPATIENT)
Dept: RADIOLOGY | Facility: CLINIC | Age: 73
End: 2018-10-12

## 2018-10-12 ENCOUNTER — APPOINTMENT (OUTPATIENT)
Dept: RADIOLOGY | Facility: CLINIC | Age: 73
End: 2018-10-12
Payer: MEDICARE

## 2018-10-12 DIAGNOSIS — R06.02 SHORTNESS OF BREATH: ICD-10-CM

## 2018-10-12 PROCEDURE — 71046 X-RAY EXAM CHEST 2 VIEWS: CPT

## 2018-10-13 ENCOUNTER — APPOINTMENT (OUTPATIENT)
Dept: LAB | Facility: CLINIC | Age: 73
End: 2018-10-13
Payer: MEDICARE

## 2018-10-13 DIAGNOSIS — R10.13 EPIGASTRIC PAIN: ICD-10-CM

## 2018-10-13 LAB
ALBUMIN SERPL BCP-MCNC: 3.8 G/DL (ref 3.5–5)
ALP SERPL-CCNC: 80 U/L (ref 46–116)
ALT SERPL W P-5'-P-CCNC: 28 U/L (ref 12–78)
ANION GAP SERPL CALCULATED.3IONS-SCNC: 4 MMOL/L (ref 4–13)
AST SERPL W P-5'-P-CCNC: 23 U/L (ref 5–45)
BASOPHILS # BLD AUTO: 0.06 THOUSANDS/ΜL (ref 0–0.1)
BASOPHILS NFR BLD AUTO: 1 % (ref 0–1)
BILIRUB SERPL-MCNC: 0.7 MG/DL (ref 0.2–1)
BILIRUB UR QL STRIP: NEGATIVE
BUN SERPL-MCNC: 14 MG/DL (ref 5–25)
CALCIUM SERPL-MCNC: 9.1 MG/DL (ref 8.3–10.1)
CHLORIDE SERPL-SCNC: 103 MMOL/L (ref 100–108)
CLARITY UR: CLEAR
CO2 SERPL-SCNC: 30 MMOL/L (ref 21–32)
COLOR UR: YELLOW
CREAT SERPL-MCNC: 1.04 MG/DL (ref 0.6–1.3)
EOSINOPHIL # BLD AUTO: 0.37 THOUSAND/ΜL (ref 0–0.61)
EOSINOPHIL NFR BLD AUTO: 6 % (ref 0–6)
ERYTHROCYTE [DISTWIDTH] IN BLOOD BY AUTOMATED COUNT: 13.2 % (ref 11.6–15.1)
GFR SERPL CREATININE-BSD FRML MDRD: 71 ML/MIN/1.73SQ M
GLUCOSE P FAST SERPL-MCNC: 128 MG/DL (ref 65–99)
GLUCOSE UR STRIP-MCNC: NEGATIVE MG/DL
HCT VFR BLD AUTO: 43.9 % (ref 36.5–49.3)
HGB BLD-MCNC: 14.6 G/DL (ref 12–17)
HGB UR QL STRIP.AUTO: NEGATIVE
IMM GRANULOCYTES # BLD AUTO: 0.02 THOUSAND/UL (ref 0–0.2)
IMM GRANULOCYTES NFR BLD AUTO: 0 % (ref 0–2)
KETONES UR STRIP-MCNC: NEGATIVE MG/DL
LEUKOCYTE ESTERASE UR QL STRIP: NEGATIVE
LYMPHOCYTES # BLD AUTO: 2.03 THOUSANDS/ΜL (ref 0.6–4.47)
LYMPHOCYTES NFR BLD AUTO: 34 % (ref 14–44)
MCH RBC QN AUTO: 33.3 PG (ref 26.8–34.3)
MCHC RBC AUTO-ENTMCNC: 33.3 G/DL (ref 31.4–37.4)
MCV RBC AUTO: 100 FL (ref 82–98)
MONOCYTES # BLD AUTO: 0.66 THOUSAND/ΜL (ref 0.17–1.22)
MONOCYTES NFR BLD AUTO: 11 % (ref 4–12)
NEUTROPHILS # BLD AUTO: 2.89 THOUSANDS/ΜL (ref 1.85–7.62)
NEUTS SEG NFR BLD AUTO: 48 % (ref 43–75)
NITRITE UR QL STRIP: NEGATIVE
NRBC BLD AUTO-RTO: 0 /100 WBCS
PH UR STRIP.AUTO: 7.5 [PH] (ref 4.5–8)
PLATELET # BLD AUTO: 194 THOUSANDS/UL (ref 149–390)
PMV BLD AUTO: 11.2 FL (ref 8.9–12.7)
POTASSIUM SERPL-SCNC: 3.9 MMOL/L (ref 3.5–5.3)
PROT SERPL-MCNC: 7.8 G/DL (ref 6.4–8.2)
PROT UR STRIP-MCNC: NEGATIVE MG/DL
RBC # BLD AUTO: 4.38 MILLION/UL (ref 3.88–5.62)
SODIUM SERPL-SCNC: 137 MMOL/L (ref 136–145)
SP GR UR STRIP.AUTO: 1.01 (ref 1–1.03)
UROBILINOGEN UR QL STRIP.AUTO: 0.2 E.U./DL
WBC # BLD AUTO: 6.03 THOUSAND/UL (ref 4.31–10.16)

## 2018-10-13 PROCEDURE — 81003 URINALYSIS AUTO W/O SCOPE: CPT | Performed by: INTERNAL MEDICINE

## 2018-10-13 PROCEDURE — 85025 COMPLETE CBC W/AUTO DIFF WBC: CPT

## 2018-10-13 PROCEDURE — 36415 COLL VENOUS BLD VENIPUNCTURE: CPT

## 2018-10-13 PROCEDURE — 80053 COMPREHEN METABOLIC PANEL: CPT

## 2018-10-15 ENCOUNTER — TELEPHONE (OUTPATIENT)
Dept: FAMILY MEDICINE CLINIC | Facility: OTHER | Age: 73
End: 2018-10-15

## 2018-10-15 NOTE — TELEPHONE ENCOUNTER
Pt notified   ----- Message from Ivana Serrano, Luis Bradshaw sent at 10/15/2018 10:19 AM EDT -----  Can we let Mr  Lashonda Layton know that his CXR and blood work results came back NORMAL  Will call with abdominal ultrasound results when they come through   Thanks

## 2018-10-17 ENCOUNTER — OFFICE VISIT (OUTPATIENT)
Dept: NEUROLOGY | Facility: CLINIC | Age: 73
End: 2018-10-17
Payer: MEDICARE

## 2018-10-17 VITALS
SYSTOLIC BLOOD PRESSURE: 130 MMHG | HEART RATE: 70 BPM | WEIGHT: 206.6 LBS | HEIGHT: 67 IN | DIASTOLIC BLOOD PRESSURE: 70 MMHG | BODY MASS INDEX: 32.43 KG/M2

## 2018-10-17 DIAGNOSIS — R25.2 MUSCLE CRAMP: ICD-10-CM

## 2018-10-17 DIAGNOSIS — R25.9 MIXED ACTION AND RESTING TREMOR: Primary | ICD-10-CM

## 2018-10-17 PROCEDURE — 99214 OFFICE O/P EST MOD 30 MIN: CPT | Performed by: PSYCHIATRY & NEUROLOGY

## 2018-10-17 NOTE — PATIENT INSTRUCTIONS
jermain reach out ot PCP to clarify mentation change leading to topiramate decrease before making any changes

## 2018-10-17 NOTE — PROGRESS NOTES
Patient ID: Manuel Poe is a 68 y o  male  Assessment/Plan:    Mixed action and resting tremor  Mixed tremors with no clear parkinsonian symptoms other than mild decreased arm swing  Prior trial of levodopa with no effect  Topiramate with partial improvement  Adverse effects to gabapentin  On beta blocker  Discussed increasing topiramate again but cognitive side effects may limit this  Can consider switching to primidone  Muscle cramp  Reports cramping  It appears they have tried switching statins to see if related  He is not interested in additional treatment for this  Diagnoses and all orders for this visit:    Mixed action and resting tremor    Muscle cramp           Subjective:    Velasquez Larry is a 68 year old left handed male with 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, moderate coronary artery disease s/p cath 2012, hypertension, diabetes, hyperlipedimeia, GERD and tremors who present for follow up for tremors  This is my first visit with Mr Sammy Moritz as he was previously seen by APs  Review of notes reveals, he presented to the ED on 5/17/17 with complaints of persistent chest pain and SOB and noted to have left sided facial droop by his family a few days prior which had been improving, speech changes, left sided weakness with movements  His troponins were negative, ECHO LVEF 60%, with left ventricle concentric remodeling, mild TR and moderate AS  Stress test normal  Chest pain felt to be GI related  Stroke workup was normal  Neurology was consulted and felt that this may have been a TIA  His stroke preventions were maximized  It was recommended he remain on ASA and Plavix daily  He remains on Metoprolol and lisinopril for BP  and atorvastatin  He was noted to have evidence of neuropathy on exam    EMG was performed and revealed mild polyneuropathy  He did not fit the clinical picture for PD however he was to remain on his home dose of Sinemet   He had an EGD performed with GI and it was felt that his dysphagia was related to muscular weakness  Swallow study revealed oral and pharyngeal dysphagia with no aspiration  He quit drinking in [address] [facility] and smoking in 1973  After review of his PCP notes it appears he began complaining of left sided hand tremor in 7/2016  At that time it was noted more when eating cereal in the AM  He was started on a trial of Sinemet 1tab qam  Per the notes there was no clear improvement with this medication and it was felt that the tremors were likely more related to anxiety  No family history of tremors or PD  He had side effects with Neurontin in the past   He had no worsening of symptoms after stopping Sinemet  He has tremor at rest and with action but are worse with action  Tremors can be present when eating  It can cause spills  It is worse when around others  He can have trouble buttoning due to loss of thumb not tremor  He has some imbalance which has recentl been worse  No falls  He has trouble arising  He believes topiramate is helping but when first asked he was not even sure if he was taking it  No side effects per patient but it appears his PCP lowered dose when he complained of cognitive changes and started him on donepezil  He also has leg and hand cramps which can last a few seconds to minutes  These are described as  severe spasm in either calf or hs hand  This can happen a few times in succession  It occurs when laying down  Worse at night  The following portions of the patient's history were reviewed and updated as appropriate: allergies, current medications, past family history, past medical history, past social history and past surgical history  Objective:    Blood pressure 130/70, pulse 70, height 5' 7" (1 702 m), weight 93 7 kg (206 lb 9 6 oz)  Physical Exam   Constitutional: He appears well-developed  Eyes: Pupils are equal, round, and reactive to light  EOM are normal    Neurological: He has normal strength     Reflex Scores:       Bicep reflexes are 1+ on the right side and 1+ on the left side  Patellar reflexes are 0 on the right side and 0 on the left side  Psychiatric: His speech is normal    Vitals reviewed  Neurological Exam  Mental Status   Oriented to person, place, time and situation  Recent and remote memory are intact  Speech is normal  Language is fluent with no aphasia  Attention and concentration are normal     Cranial Nerves  CN II: Visual fields full to confrontation  CN III, IV, VI: Extraocular movements intact bilaterally  Pupils equal round and reactive to light bilaterally  CN V: Facial sensation is normal   CN VII: Full and symmetric facial movement  CN VIII: Hearing is normal   CN IX, X: Palate elevates symmetrically  CN XI: Shoulder shrug strength is normal   CN XII: Tongue midline without atrophy or fasciculations  Motor   Normal muscle tone  Strength is 5/5 throughout all four extremities  Sensory  Light touch is normal in upper and lower extremities  Pinprick abnormality: Decreased to right knee , left mid calve  Vibration abnormality: Decreased to knees  Reflexes                                           Right                      Left  Biceps                                 1+                         1+  Patellar                                0                         0    Coordination  Right: Finger-to-nose abnormality: Postural and mild intentional   Left: Finger-to-nose abnormality: Postural and mild intentional   Handwriting slightly tremulous Spirals with tremor           ROS:    Review of Systems   Constitutional: Positive for fatigue  Negative for appetite change and fever  HENT: Positive for congestion, hearing loss and trouble swallowing  Negative for tinnitus and voice change  Eyes: Negative  Negative for photophobia and pain  Respiratory: Positive for chest tightness and shortness of breath  Cardiovascular: Negative  Negative for palpitations  Gastrointestinal: Positive for abdominal pain  Negative for vomiting  Endocrine: Negative  Negative for cold intolerance and heat intolerance  Genitourinary: Positive for frequency and urgency  Negative for dysuria  Musculoskeletal: Positive for back pain and neck pain  Negative for myalgias  Skin: Negative  Negative for rash  Neurological: Positive for tremors  Negative for dizziness, seizures, syncope, facial asymmetry, speech difficulty, weakness, light-headedness, numbness and headaches  Hematological: Negative  Does not bruise/bleed easily  Psychiatric/Behavioral: Negative  Negative for confusion, hallucinations and sleep disturbance

## 2018-10-18 ENCOUNTER — HOSPITAL ENCOUNTER (OUTPATIENT)
Dept: ULTRASOUND IMAGING | Facility: HOSPITAL | Age: 73
Discharge: HOME | End: 2018-10-18
Attending: NURSE PRACTITIONER
Payer: MEDICARE

## 2018-10-18 DIAGNOSIS — R13.10 DYSPHAGIA, UNSPECIFIED TYPE: ICD-10-CM

## 2018-10-18 DIAGNOSIS — R10.13 EPIGASTRIC PAIN: ICD-10-CM

## 2018-10-18 PROCEDURE — 76700 US EXAM ABDOM COMPLETE: CPT

## 2018-10-18 NOTE — ASSESSMENT & PLAN NOTE
Reports cramping  It appears they have tried switching statins to see if related  He is not interested in additional treatment for this

## 2018-10-18 NOTE — ASSESSMENT & PLAN NOTE
Mixed tremors with no clear parkinsonian symptoms other than mild decreased arm swing  Prior trial of levodopa with no effect  Topiramate with partial improvement  Adverse effects to gabapentin  On beta blocker  Discussed increasing topiramate again but cognitive side effects may limit this  Can consider switching to primidone

## 2018-10-19 DIAGNOSIS — I71.2 THORACIC ASCENDING AORTIC ANEURYSM (HCC): ICD-10-CM

## 2018-10-19 DIAGNOSIS — I71.4 ANEURYSM OF INFRARENAL ABDOMINAL AORTA (HCC): Primary | ICD-10-CM

## 2018-10-19 DIAGNOSIS — R06.02 SHORTNESS OF BREATH: ICD-10-CM

## 2018-11-02 ENCOUNTER — TELEPHONE (OUTPATIENT)
Dept: FAMILY MEDICINE CLINIC | Facility: OTHER | Age: 73
End: 2018-11-02

## 2018-11-02 ENCOUNTER — HOSPITAL ENCOUNTER (OUTPATIENT)
Dept: CT IMAGING | Facility: HOSPITAL | Age: 73
Discharge: HOME | End: 2018-11-02
Attending: NURSE PRACTITIONER
Payer: MEDICARE

## 2018-11-02 DIAGNOSIS — I71.4 ANEURYSM OF INFRARENAL ABDOMINAL AORTA (HCC): ICD-10-CM

## 2018-11-02 DIAGNOSIS — I71.2 THORACIC ASCENDING AORTIC ANEURYSM (HCC): ICD-10-CM

## 2018-11-02 PROCEDURE — 71275 CT ANGIOGRAPHY CHEST: CPT

## 2018-11-02 PROCEDURE — 74174 CTA ABD&PLVS W/CONTRAST: CPT

## 2018-11-02 RX ADMIN — IOHEXOL 100 ML: 350 INJECTION, SOLUTION INTRAVENOUS at 17:59

## 2018-11-02 NOTE — TELEPHONE ENCOUNTER
Nestor Paz (radiology tech) called wants clarification on test that is ordered for Tonite she thinks the CTA Abd and Pelvis is what the patient should get ~  the CTA  with run off shows the legs and usually they said a vascular dr orders that   Please advise and call at [phone]  Thank you

## 2018-11-07 ENCOUNTER — HOSPITAL ENCOUNTER (OUTPATIENT)
Dept: PULMONOLOGY | Facility: HOSPITAL | Age: 73
Discharge: HOME | End: 2018-11-07
Attending: NURSE PRACTITIONER
Payer: MEDICARE

## 2018-11-07 DIAGNOSIS — R06.02 SHORTNESS OF BREATH: ICD-10-CM

## 2018-11-07 PROCEDURE — 94060 EVALUATION OF WHEEZING: CPT

## 2018-11-07 PROCEDURE — 94726 PLETHYSMOGRAPHY LUNG VOLUMES: CPT

## 2018-11-07 PROCEDURE — 94729 DIFFUSING CAPACITY: CPT

## 2018-11-07 PROCEDURE — 94060 EVALUATION OF WHEEZING: CPT | Performed by: INTERNAL MEDICINE

## 2018-11-07 PROCEDURE — 94760 N-INVAS EAR/PLS OXIMETRY 1: CPT

## 2018-11-07 PROCEDURE — 94726 PLETHYSMOGRAPHY LUNG VOLUMES: CPT | Performed by: INTERNAL MEDICINE

## 2018-11-07 PROCEDURE — 94729 DIFFUSING CAPACITY: CPT | Performed by: INTERNAL MEDICINE

## 2018-11-07 RX ORDER — ALBUTEROL SULFATE 2.5 MG/3ML
2.5 SOLUTION RESPIRATORY (INHALATION) ONCE
Status: COMPLETED | OUTPATIENT
Start: 2018-11-07 | End: 2018-11-07

## 2018-11-07 RX ADMIN — ALBUTEROL SULFATE 2.5 MG: 2.5 SOLUTION RESPIRATORY (INHALATION) at 13:52

## 2018-11-13 PROBLEM — N20.0 RENAL CALCULI: Status: ACTIVE | Noted: 2018-11-13

## 2018-11-13 PROBLEM — M43.16 SPONDYLOLISTHESIS AT L4-L5 LEVEL: Status: ACTIVE | Noted: 2018-11-13

## 2018-12-05 DIAGNOSIS — K21.9 CHRONIC GERD: ICD-10-CM

## 2018-12-05 DIAGNOSIS — E78.00 HYPERCHOLESTEREMIA: Primary | ICD-10-CM

## 2018-12-05 RX ORDER — ATORVASTATIN CALCIUM 40 MG/1
TABLET, FILM COATED ORAL
Qty: 90 TABLET | Refills: 3 | Status: SHIPPED | OUTPATIENT
Start: 2018-12-05 | End: 2020-10-09

## 2018-12-05 RX ORDER — RANITIDINE 300 MG/1
TABLET ORAL
Qty: 30 TABLET | Refills: 4 | Status: SHIPPED | OUTPATIENT
Start: 2018-12-05 | End: 2020-10-09 | Stop reason: ALTCHOICE

## 2018-12-12 ENCOUNTER — OFFICE VISIT (OUTPATIENT)
Dept: GASTROENTEROLOGY | Facility: AMBULARY SURGERY CENTER | Age: 73
End: 2018-12-12
Payer: MEDICARE

## 2018-12-12 VITALS
WEIGHT: 205.4 LBS | TEMPERATURE: 97.6 F | HEIGHT: 67 IN | HEART RATE: 72 BPM | SYSTOLIC BLOOD PRESSURE: 150 MMHG | BODY MASS INDEX: 32.24 KG/M2 | RESPIRATION RATE: 18 BRPM | DIASTOLIC BLOOD PRESSURE: 90 MMHG

## 2018-12-12 DIAGNOSIS — R10.10 PAIN OF UPPER ABDOMEN: Primary | ICD-10-CM

## 2018-12-12 DIAGNOSIS — K21.9 CHRONIC GERD: ICD-10-CM

## 2018-12-12 PROCEDURE — 99214 OFFICE O/P EST MOD 30 MIN: CPT | Performed by: INTERNAL MEDICINE

## 2018-12-12 NOTE — PROGRESS NOTES
Follow-up Note -  [facility]  Layla Wyatt 1945 male         Reason:  Abdominal pain    HPI:  Mr Betty Aguilera was brought in by his daughter because of epigastric pain  He reports having pain in the upper abdomen usually in the morning  Occasional nausea but denies any vomiting  He had lab work and CT scan done which were normal   Regular bowel movements and denies any blood or mucus in the stool  Denies any heartburn acid reflux  He takes Protonix and ranitidine regularly  Denies any difficulty swallowing  His last colonoscopy was in 2014 which was normal     REVIEW OF SYSTEMS: Review of Systems   Constitutional: Negative for activity change, appetite change, chills, diaphoresis, fatigue, fever and unexpected weight change  HENT: Negative for ear discharge, ear pain, facial swelling, hearing loss, nosebleeds, sore throat, tinnitus and voice change  Eyes: Negative for pain, discharge, redness, itching and visual disturbance  Respiratory: Negative for apnea, cough, chest tightness, shortness of breath and wheezing  Cardiovascular: Negative for chest pain and palpitations  Gastrointestinal:        As noted in HPI   Endocrine: Negative for cold intolerance, heat intolerance and polyuria  Genitourinary: Negative for difficulty urinating, dysuria, flank pain, hematuria and urgency  Musculoskeletal: Negative for arthralgias, back pain, gait problem, joint swelling and myalgias  Skin: Negative for rash and wound  Neurological: Negative for dizziness, tremors, seizures, speech difficulty, light-headedness, numbness and headaches  Hematological: Negative for adenopathy  Does not bruise/bleed easily  Psychiatric/Behavioral: Negative for agitation, behavioral problems and confusion  The patient is not nervous/anxious           Past Medical History:   Diagnosis Date    AAA (abdominal aortic aneurysm) (HCC)     Anemia     Coronary artery disease     Diabetes mellitus ([facility] [address] )  GERD (gastroesophageal reflux disease)     Hiatal hernia     Hyperlipidemia     Hypertension     Memory changes     [facility] infarction ([facility] )     Onychomycosis     Parkinson disease ([place] [address] )     Parkinson disease ([place] [address] )     Pneumonia     PVD (peripheral vascular disease) ([place] [address] )     Thoracic ascending aortic aneurysm ([place] [address] )     last assessed 3/25/16      Past Surgical History:   Procedure Laterality Date    AMPUTATION FINGER / THUMB Right     HERNIA REPAIR      bilateral inginal repair with mesh    POPLITEAL ARTERY ANGIOPLASTY      OR ESOPHAGOGASTRODUODENOSCOPY TRANSORAL DIAGNOSTIC N/A 5/22/2017    Procedure: ESOPHAGOGASTRODUODENOSCOPY (EGD); Surgeon: Chase Jay MD;  Location[facility]; Service: Gastroenterology     Social History     Social History    Marital status:      Spouse name: N/A    Number of children: N/A    Years of education: N/A     Occupational History    Not on file       Social History Main Topics    Smoking status: Former Smoker     Quit date: 5/22/1973    Smokeless tobacco: Never Used    Alcohol use No      Comment: QUIT 1990    Drug use: No    Sexual activity: Not on file     Other Topics Concern    Not on file     Social History Narrative    No narrative on file     Family History   Problem Relation Age of Onset    Heart attack Mother     Heart attack Father     Aneurysm Sister      Gabapentin  Current Outpatient Prescriptions   Medication Sig Dispense Refill    ACCU-CHEK SOFTCLIX LANCETS lancets USE AS INSTRUCTED 100 each 0    albuterol (PROAIR HFA) 90 mcg/act inhaler Inhale 2 puffs every 6 (six) hours as needed for wheezing 8 5 g 0    aspirin (ECOTRIN LOW STRENGTH) 81 mg EC tablet Take 81 mg by mouth daily      atorvastatin (LIPITOR) 40 mg tablet TAKE ONE TABLET BY MOUTH IN THE EVENING DAILY 90 tablet 3    citalopram (CeleXA) 20 mg tablet Take 20 mg by mouth daily      clopidogrel (PLAVIX) 75 mg tablet 1 TAB AT SUPPER  0    co-enzyme Q-10 30 MG capsule Take 200 mg by mouth daily      cyanocobalamin (VITAMIN B-12) 1,000 mcg tablet Take 1,000 mcg by mouth daily      donepezil (ARICEPT) 10 mg tablet 1 TAB AT BEDTIME 30 tablet 0    glucose blood (ACCU-CHEK PRIMO) test strip Use as instructed 100 each 0    isosorbide mononitrate (IMDUR) 60 mg 24 hr tablet Take 1 tablet by mouth daily      metFORMIN (GLUCOPHAGE) 1000 MG tablet Take 500 mg by mouth daily        metoprolol tartrate (LOPRESSOR) 25 mg tablet TAKE HALF TABLET BY MOUTH TWO TIMES A DAY 15 tablet 4    pantoprazole (PROTONIX) 40 mg tablet Take 40 mg by mouth daily      ranitidine (ZANTAC) 300 MG tablet TAKE ONE TABLET BY MOUTH AT BEDTIME 30 tablet 4    topiramate (TOPAMAX) 25 mg tablet Change to 1/2 tab daily- due to change in mentation 60 tablet 0    aluminum-magnesium hydroxide-simethicone (MAALOX MAX) 400-400-40 MG/5ML suspension Take 10 mL by mouth every 6 (six) hours as needed for indigestion or heartburn (Patient not taking: Reported on 10/17/2018 ) 355 mL 0    amLODIPine (NORVASC) 5 mg tablet Take 1 tablet (5 mg total) by mouth daily (Patient not taking: Reported on 12/12/2018 ) 90 tablet 3    olmesartan (BENICAR) 40 mg tablet Take 1 tablet (40 mg total) by mouth daily This replaces valsartan 320 (Patient not taking: Reported on 12/12/2018 ) 90 tablet 3     No current facility-administered medications for this visit  Blood pressure 150/90, pulse 72, temperature 97 6 °F (36 4 °C), temperature source Tympanic, resp  rate 18, height 5' 7" (1 702 m), weight 93 2 kg (205 lb 6 4 oz)  PHYSICAL EXAM: Physical Exam   Constitutional: He is oriented to person, place, and time  He appears well-developed  HENT:   Head: Normocephalic and atraumatic  Mouth/Throat: Oropharynx is clear and moist    Eyes: Pupils are equal, round, and reactive to light  Conjunctivae are normal  Right eye exhibits no discharge  Left eye exhibits no discharge  No scleral icterus  Neck: Neck supple   No JVD present  No tracheal deviation present  No thyromegaly present  Cardiovascular: Normal rate, regular rhythm, normal heart sounds and intact distal pulses  Exam reveals no gallop and no friction rub  No murmur heard  Pulmonary/Chest: Effort normal and breath sounds normal  No respiratory distress  He has no wheezes  He has no rales  He exhibits no tenderness  Abdominal: Soft  Bowel sounds are normal  He exhibits no distension and no mass  There is no tenderness  There is no rebound and no guarding  No hernia  Musculoskeletal: He exhibits no edema  Lymphadenopathy:     He has no cervical adenopathy  Neurological: He is alert and oriented to person, place, and time  Skin: Skin is warm and dry  No rash noted  No erythema  Psychiatric: He has a normal mood and affect  His behavior is normal  Thought content normal         Lab Results   Component Value Date    WBC 6 03 10/13/2018    HGB 14 6 10/13/2018    HCT 43 9 10/13/2018     (H) 10/13/2018     10/13/2018     Lab Results   Component Value Date    GLUCOSE 94 07/24/2015    CALCIUM 9 1 10/13/2018     07/24/2015    K 3 9 10/13/2018    CO2 30 10/13/2018     10/13/2018    BUN 14 10/13/2018    CREATININE 1 04 10/13/2018     Lab Results   Component Value Date    ALT 28 10/13/2018    AST 23 10/13/2018    ALKPHOS 80 10/13/2018    BILITOT 0 37 07/24/2015     No results found for: INR, PROTIME    No results found  ASSESSMENT & PLAN:    Pain of upper abdomen  Possible from peptic ulcer disease or gastric erosions but doubt as he has been on Protonix regularly  CT scan and lab workup was normal     -advised him to take Protonix half an hour before breakfast in the morning    -schedule for EGD    -Patient was explained about the lifestyle and dietary modifications  Advised to avoid fatty foods, chocolates, caffeine, alcohol and any other triggering foods    Avoid eating for at least 3 hours before going to bed         -Patient was explained about  the risks and benefits of the procedure  Risks including but not limited to bleeding, infection, perforation were explained in detail  Also explained about less than 100% sensitivity with the exam and other alternatives

## 2018-12-12 NOTE — ASSESSMENT & PLAN NOTE
Possible from peptic ulcer disease or gastric erosions but doubt as he has been on Protonix regularly  CT scan and lab workup was normal     -advised him to take Protonix half an hour before breakfast in the morning    -schedule for EGD    -Patient was explained about the lifestyle and dietary modifications  Advised to avoid fatty foods, chocolates, caffeine, alcohol and any other triggering foods  Avoid eating for at least 3 hours before going to bed         -Patient was explained about  the risks and benefits of the procedure  Risks including but not limited to bleeding, infection, perforation were explained in detail  Also explained about less than 100% sensitivity with the exam and other alternatives

## 2018-12-18 ENCOUNTER — TELEPHONE (OUTPATIENT)
Dept: GASTROENTEROLOGY | Facility: AMBULARY SURGERY CENTER | Age: 73
End: 2018-12-18

## 2018-12-18 DIAGNOSIS — F33.42 RECURRENT MAJOR DEPRESSIVE DISORDER, IN FULL REMISSION (HCC): Primary | ICD-10-CM

## 2018-12-18 DIAGNOSIS — R41.89 COGNITIVE CHANGES: ICD-10-CM

## 2018-12-21 RX ORDER — DONEPEZIL HYDROCHLORIDE 10 MG/1
TABLET, FILM COATED ORAL
Qty: 30 TABLET | Refills: 5 | Status: SHIPPED | OUTPATIENT
Start: 2018-12-21 | End: 2020-10-09 | Stop reason: ALTCHOICE

## 2018-12-21 RX ORDER — CITALOPRAM 20 MG/1
TABLET ORAL
Qty: 90 TABLET | Refills: 1 | Status: SHIPPED | OUTPATIENT
Start: 2018-12-21 | End: 2020-10-09 | Stop reason: ALTCHOICE

## 2019-01-02 ENCOUNTER — ANESTHESIA EVENT (OUTPATIENT)
Dept: PERIOP | Facility: AMBULARY SURGERY CENTER | Age: 74
End: 2019-01-02

## 2019-01-14 ENCOUNTER — ANESTHESIA (OUTPATIENT)
Dept: PERIOP | Facility: AMBULARY SURGERY CENTER | Age: 74
End: 2019-01-14

## 2019-02-20 ENCOUNTER — ANESTHESIA EVENT (OUTPATIENT)
Dept: GASTROENTEROLOGY | Facility: HOSPITAL | Age: 74
End: 2019-02-20
Payer: MEDICARE

## 2019-02-21 ENCOUNTER — HOSPITAL ENCOUNTER (OUTPATIENT)
Facility: HOSPITAL | Age: 74
Setting detail: OUTPATIENT SURGERY
Discharge: HOME | End: 2019-02-21
Attending: INTERNAL MEDICINE | Admitting: INTERNAL MEDICINE
Payer: MEDICARE

## 2019-02-21 ENCOUNTER — ANESTHESIA (OUTPATIENT)
Dept: GASTROENTEROLOGY | Facility: HOSPITAL | Age: 74
End: 2019-02-21
Payer: MEDICARE

## 2019-02-21 VITALS
SYSTOLIC BLOOD PRESSURE: 155 MMHG | DIASTOLIC BLOOD PRESSURE: 87 MMHG | HEIGHT: 67 IN | RESPIRATION RATE: 20 BRPM | TEMPERATURE: 97 F | BODY MASS INDEX: 30.76 KG/M2 | HEART RATE: 53 BPM | WEIGHT: 196 LBS | OXYGEN SATURATION: 93 %

## 2019-02-21 DIAGNOSIS — K21.9 CHRONIC GERD: ICD-10-CM

## 2019-02-21 DIAGNOSIS — R10.10 PAIN OF UPPER ABDOMEN: ICD-10-CM

## 2019-02-21 LAB — GLUCOSE SERPL-MCNC: 111 MG/DL (ref 65–140)

## 2019-02-21 PROCEDURE — 43239 EGD BIOPSY SINGLE/MULTIPLE: CPT | Performed by: INTERNAL MEDICINE

## 2019-02-21 PROCEDURE — 82948 REAGENT STRIP/BLOOD GLUCOSE: CPT

## 2019-02-21 PROCEDURE — 88305 TISSUE EXAM BY PATHOLOGIST: CPT | Performed by: PATHOLOGY

## 2019-02-21 PROCEDURE — 43245 EGD DILATE STRICTURE: CPT | Performed by: INTERNAL MEDICINE

## 2019-02-21 RX ORDER — PROPOFOL 10 MG/ML
INJECTION, EMULSION INTRAVENOUS AS NEEDED
Status: DISCONTINUED | OUTPATIENT
Start: 2019-02-21 | End: 2019-02-21 | Stop reason: SURG

## 2019-02-21 RX ORDER — ALBUTEROL SULFATE 2.5 MG/3ML
2.5 SOLUTION RESPIRATORY (INHALATION) ONCE AS NEEDED
Status: DISCONTINUED | OUTPATIENT
Start: 2019-02-21 | End: 2019-02-21 | Stop reason: HOSPADM

## 2019-02-21 RX ORDER — LIDOCAINE HYDROCHLORIDE 10 MG/ML
INJECTION, SOLUTION INFILTRATION; PERINEURAL AS NEEDED
Status: DISCONTINUED | OUTPATIENT
Start: 2019-02-21 | End: 2019-02-21 | Stop reason: SURG

## 2019-02-21 RX ORDER — SODIUM CHLORIDE 9 MG/ML
125 INJECTION, SOLUTION INTRAVENOUS CONTINUOUS
Status: DISCONTINUED | OUTPATIENT
Start: 2019-02-21 | End: 2019-02-21 | Stop reason: HOSPADM

## 2019-02-21 RX ORDER — MEPERIDINE HYDROCHLORIDE 25 MG/ML
12.5 INJECTION INTRAMUSCULAR; INTRAVENOUS; SUBCUTANEOUS AS NEEDED
Status: DISCONTINUED | OUTPATIENT
Start: 2019-02-21 | End: 2019-02-21 | Stop reason: HOSPADM

## 2019-02-21 RX ORDER — ONDANSETRON 2 MG/ML
4 INJECTION INTRAMUSCULAR; INTRAVENOUS ONCE AS NEEDED
Status: DISCONTINUED | OUTPATIENT
Start: 2019-02-21 | End: 2019-02-21 | Stop reason: HOSPADM

## 2019-02-21 RX ADMIN — PROPOFOL 50 MG: 10 INJECTION, EMULSION INTRAVENOUS at 09:02

## 2019-02-21 RX ADMIN — LIDOCAINE HYDROCHLORIDE ANHYDROUS 50 MG: 10 INJECTION, SOLUTION INFILTRATION at 08:56

## 2019-02-21 RX ADMIN — PROPOFOL 50 MG: 10 INJECTION, EMULSION INTRAVENOUS at 08:59

## 2019-02-21 RX ADMIN — SODIUM CHLORIDE: 0.9 INJECTION, SOLUTION INTRAVENOUS at 08:35

## 2019-02-21 RX ADMIN — PROPOFOL 100 MG: 10 INJECTION, EMULSION INTRAVENOUS at 08:56

## 2019-02-21 NOTE — ANESTHESIA PREPROCEDURE EVALUATION
Review of Systems/Medical History  Patient summary reviewed    No history of anesthetic complications     Cardiovascular  EKG reviewed, Hyperlipidemia, Hypertension , Past MI , CAD , Angina ,   Comment: HTN, hyperlipid, AAA, PVD  Echo: Good LV, mild to mod AS  Stress negative for ischemia    SUMMARY     LEFT VENTRICLE:  Systolic function was normal  Ejection fraction was estimated to be 55 %  There were no regional wall motion abnormalities  Wall thickness was mildly increased  The changes were consistent with concentric remodeling (increased wall thickness with normal wall mass)  Doppler parameters were consistent with abnormal left ventricular relaxation (grade 1 diastolic dysfunction)      RIGHT VENTRICLE:  The size was normal   Systolic function was normal      AORTIC VALVE:  Transaortic velocity was increased due to valvular stenosis  There was mild to moderate stenosis (TIFFANI 1 3 cm2/MG 11 mmHg/DI 0 38) with low gradient due to low stroke volume  There was trace regurgitation      TRICUSPID VALVE:  There was mild regurgitation      AORTA:  The root exhibited upper limit of normal size  Mild dilatation of the proximal ascending aorta (3 9 cm)  ,  Pulmonary  Pneumonia, Shortness of breath, Sleep apnea ,        GI/Hepatic    GERD ,  Hiatal hernia,        Kidney stones,        Endo/Other  Diabetes ,      GYN       Hematology   Musculoskeletal       Neurology    Neuromuscular disease ,    Psychology           Physical Exam    Airway    Mallampati score: I  TM Distance: >3 FB  Neck ROM: full     Dental   upper dentures,     Cardiovascular      Pulmonary      Other Findings        Anesthesia Plan  ASA Score- 3     Anesthesia Type- IV sedation with anesthesia with ASA Monitors  Additional Monitors:   Airway Plan:     Comment: I have seen the patient and reviewed the history  Patient to receive IV sedation with full ASA monitors  Risks discussed with the patient, consent signed          Plan Factors-    Induction- intravenous  Postoperative Plan-     Informed Consent- Anesthetic plan and risks discussed with patient  I personally reviewed this patient with the CRNA  Discussed and agreed on the Anesthesia Plan with the CRNA  Olga Nina

## 2019-02-21 NOTE — H&P
History and Physical - [facility]  Biju Heller 68 y o  male MRN: [id number]        HPI:  77-year-old male with history of multiple medical problems has been having epigastric pain and he was started on Protonix with improvement  Complaining about difficulty swallowing and choking    Historical Information   Past Medical History:   Diagnosis Date    [facility]) [facility])     Anemia     Coronary artery disease     Diabetes mellitus (HCC)     GERD (gastroesophageal reflux disease)     Hiatal hernia     Hyperlipidemia     Hypertension     Memory changes     Myocardial infarction (HCC)     Onychomycosis     Parkinson disease ([facility] )     Parkinson disease ([facility] )     Pneumonia     PVD (peripheral vascular disease) ([facility] )     Thoracic ascending aortic aneurysm ([facility] )     last assessed 3/25/16     Past Surgical History:   Procedure Laterality Date    AMPUTATION FINGER / THUMB Right     HERNIA REPAIR      bilateral inginal repair with mesh    POPLITEAL ARTERY ANGIOPLASTY      NC ESOPHAGOGASTRODUODENOSCOPY TRANSORAL DIAGNOSTIC N/A 2017    Procedure: ESOPHAGOGASTRODUODENOSCOPY (EGD); Surgeon: Marian Spence MD;  Location: [facility];   Service: Gastroenterology     Social History   Social History     Substance and Sexual Activity   Alcohol Use No    Comment: QUIT      Social History     Substance and Sexual Activity   Drug Use No     Social History     Tobacco Use   Smoking Status Former Smoker    Last attempt to quit: 1973    Years since quittin 7   Smokeless Tobacco Never Used     Family History   Problem Relation Age of Onset    Heart attack Mother     Heart attack Father     Aneurysm Sister        Meds/Allergies     Medications Prior to Admission   Medication    amLODIPine (NORVASC) 5 mg tablet    aspirin (ECOTRIN LOW STRENGTH) 81 mg EC tablet    atorvastatin (LIPITOR) 40 mg tablet    citalopram (CeleXA) 20 mg tablet    clopidogrel (PLAVIX) 75 mg tablet    co-enzyme Q-10 30 MG capsule    cyanocobalamin (VITAMIN B-12) 1,000 mcg tablet    donepezil (ARICEPT) 10 mg tablet    isosorbide mononitrate (IMDUR) 60 mg 24 hr tablet    metFORMIN (GLUCOPHAGE) 1000 MG tablet    metoprolol tartrate (LOPRESSOR) 25 mg tablet    olmesartan (BENICAR) 40 mg tablet    pantoprazole (PROTONIX) 40 mg tablet    ranitidine (ZANTAC) 300 MG tablet    topiramate (TOPAMAX) 25 mg tablet    ACCU-CHEK SOFTCLIX LANCETS lancets    albuterol (PROAIR HFA) 90 mcg/act inhaler    aluminum-magnesium hydroxide-simethicone (MAALOX MAX) 400-400-40 MG/5ML suspension    glucose blood (ACCU-CHEK PRIMO) test strip       Allergies   Allergen Reactions    Gabapentin Nausea Only and GI Intolerance       Objective     Blood pressure (!) 183/88, pulse 57, temperature (!) 96 9 °F (36 1 °C), temperature source Temporal, resp  rate 18, height 5' 7" (1 702 m), weight 88 9 kg (196 lb), SpO2 95 %      PHYSICAL EXAM:    Gen: NAD  CV: S1 & S2 normal, RRR  CHEST: Clear to auscultate  ABD: soft, NT/ND, good bowel sounds  EXT: no edema    ASSESSMENT:     Epigastric pain/ dysphagia    PLAN:    EGD and possible dilation

## 2019-02-21 NOTE — ANESTHESIA POSTPROCEDURE EVALUATION
Post-Op Assessment Note    CV Status:  Stable    Pain management: adequate     Mental Status:  Alert and awake   Hydration Status:  Euvolemic   PONV Controlled:  Controlled   Airway Patency:  Patent   Post Op Vitals Reviewed: Yes      Staff: CRNA   Comments: vss report rn          BP     Temp     Pulse     Resp      SpO2

## 2019-02-21 NOTE — OP NOTE
ESOPHAGOGASTRODUODENOSCOPY    PROCEDURE: EGD/ Biopsy, Esophageal Dilation (Bougie)    INDICATIONS: Abdominal pain, Epigastric and Dysphagia    POST-OP DIAGNOSIS: See the impression below    SEDATION: Monitored anesthesia care, check anesthesia records    CONSENT:  Informed consent was obtained for the procedure, including sedation after explaining the risks and benefits of the procedure  Risks including but not limited to bleeding, perforation, infection, aspiration were discussed in detail  Also explained about less than 100% sensitivity with the exam and other alternatives  PREPARATION:   EKG tracing, pulse oximetry, blood pressure were monitored throughout the procedure  Patient was identified by myself both verbally and by visual inspection of ID band  DESCRIPTION:   Patient was placed in the left lateral decubitus position and was sedated with the above medication  The gastroscope was introduced in to the oropharynx and the esophagus was intubated under direct visualization  Scope was passed down the esophagus up to 2nd part of the duodenum  A careful inspection was made as the gastroscope was withdrawn, including a retroflexed view of the stomach; findings and interventions are described below  FINDINGS:    #1  Esophagus and GEJ- 1-2 cm sliding hiatal hernia seen  No evidence of any mucosal rings or strictures  Empirically passed the bougies 52 and [address] [place] without any difficulty  #2  Stomach- normal   Biopsies done to check for H pylori    #3  Duodenum- normal         IMPRESSIONS:      As above    RECOMMENDATIONS:     Check pathology    Advised him to chew well before swallowing    COMPLICATIONS:  None; patient tolerated the procedure well            DISPOSITION: PACU           CONDITION: Stable

## 2019-02-25 ENCOUNTER — TELEPHONE (OUTPATIENT)
Dept: GASTROENTEROLOGY | Facility: AMBULARY SURGERY CENTER | Age: 74
End: 2019-02-25

## 2019-02-25 NOTE — TELEPHONE ENCOUNTER
----- Message from Ismael Mandujano MD sent at 2/25/2019 10:08 AM EST -----  Gastric biopsies are benign and negative for H pylori

## 2019-03-18 ENCOUNTER — OFFICE VISIT (OUTPATIENT)
Dept: SLEEP CENTER | Facility: CLINIC | Age: 74
End: 2019-03-18
Payer: MEDICARE

## 2019-03-18 VITALS
WEIGHT: 196 LBS | BODY MASS INDEX: 30.76 KG/M2 | DIASTOLIC BLOOD PRESSURE: 78 MMHG | HEART RATE: 64 BPM | SYSTOLIC BLOOD PRESSURE: 130 MMHG | HEIGHT: 67 IN

## 2019-03-18 DIAGNOSIS — G47.09 OTHER INSOMNIA: ICD-10-CM

## 2019-03-18 DIAGNOSIS — G47.10 HYPERSOMNIA: ICD-10-CM

## 2019-03-18 DIAGNOSIS — G31.84 MILD COGNITIVE IMPAIRMENT WITH MEMORY LOSS: ICD-10-CM

## 2019-03-18 DIAGNOSIS — G20 PARKINSON'S DISEASE (HCC): ICD-10-CM

## 2019-03-18 DIAGNOSIS — G47.33 OSA (OBSTRUCTIVE SLEEP APNEA): Primary | ICD-10-CM

## 2019-03-18 DIAGNOSIS — G47.61 PLMD (PERIODIC LIMB MOVEMENT DISORDER): ICD-10-CM

## 2019-03-18 PROCEDURE — 99214 OFFICE O/P EST MOD 30 MIN: CPT | Performed by: INTERNAL MEDICINE

## 2019-03-18 NOTE — PROGRESS NOTES
Review of Systems      Genitourinary need to urinate more than twice a night and difficulty with erection   Cardiology Frequent chest pain or angina,  and palpitations/fluttering feeling in the chest   Gastrointestinal frequent heartburn/acid reflux   Neurology forgetfulness, poor concentration or confusion,  and difficulty with memory   Constitutional fatigue   Integumentary rash or dry skin   Psychiatry anxiety, depression and mood change   Musculoskeletal legs twitching/jerking   Pulmonary shortness of breath with activity, wheezing and snoring   ENT throat clearing   Endocrine frequent urination   Hematological none

## 2019-03-18 NOTE — PROGRESS NOTES
Follow-Up Note - Sleep [facility]  68 y o  male  DVL:[id number]  [facility]:[id number]    CC: I saw this patient for follow-up in clinic today for his Sleep Disordered Breathing, Coexisting Sleep and Medical Problems  He is here with his son  PATRICK, Problem List, Medications & Allergies were reviewed in EMR  Interval changes: none reported  He  has a past medical history of AAA (abdominal aortic aneurysm) ([facility] ), Anemia, Coronary artery disease, Diabetes mellitus ([facility] ), GERD (gastroesophageal reflux disease), Hiatal hernia, Hyperlipidemia, Hypertension, Memory changes, Myocardial infarction ([facility] ), Onychomycosis, Parkinson disease ([facility] ), Parkinson disease ([facility] ), Pneumonia, PVD (peripheral vascular disease) ([facility] ), and Thoracic ascending aortic aneurysm ([facility] )  He has a current medication list which includes the following prescription(s): accu-chek softclix lancets, albuterol, aspirin, atorvastatin, citalopram, clopidogrel, co-enzyme q-10, cyanocobalamin, donepezil, glucose blood, isosorbide mononitrate, metformin, metoprolol tartrate, olmesartan, pantoprazole, ranitidine, topiramate, aluminum-magnesium hydroxide-simethicone, and amlodipine  ROS: Reviewed (see attached)  Significant for ongoing symptoms of anxiety and depression in spite of Celexa  He also reports auditory hallucinations and 3rd dreams  He is on Aricept for cognitive impairment  He has a groin hernia for which surgical revision is planned  DATA REVIEWED: discontinued use of PAP because he misplaced his machine after moving in with his son  SUBJECTIVE: Regarding use of PAP, Malick reports:   · He is experiencing significant adverse effects: mask discomfort  · He did not benefit from use and is sleeping better since he discontinued CPAP  · He is aware of jerking movements during sleep  · He continues to act out dream content 1-2 times a night  There is no report of sleep walking  He sleeps alone    There is no report of injury to himself  Sleep Routine: He reports getting 5 hours sleep out of approximately 12 hours in bed; he has difficulty initiating and maintaining sleep   He awakens spontaneously and never feels rested  He has excessive drowsiness and naps during the day  He rated himself at Total score: 8 /24 on the [place] sleepiness scale  Habits: reports that he quit smoking about 45 years ago  He has quit using smokeless tobacco ,  reports that he does not drink alcohol ,  reports that he does not use drugs  , Caffeine use: limited , Exercise routine: none   OBJECTIVE: /78   Pulse 64   Ht 5' 7" (1 702 m)   Wt 88 9 kg (196 lb)   BMI 30 70 kg/m²    Constitutional: Patient is well groomed; well appearing  Skin/Extrem: warm & dry; col & hydration normal; no edema  Psych: cooperativeand in no distress  Anxious, Depressed mood and constricted affect  CNS: Alert and orientated  He has a coarse tremor of the upper extremities  H&N: EOMI; NC/AT:no facial pressure marks, no rashes  Neck Circumference: 42 cm  ENMT Mucus membranes normal Nasal airway:patent  Oral airway: crowded  He has several missing teeth and poor dentition   Resp:effort is normal CVS: RRR ABD:  Soft nontender MSK:Gait unsteady     ASSESSMENT: Primary Sleep/Secondary(to Medical or Psych conditions) & comorbidities   1  CHAN (obstructive sleep apnea)     2  PLMD (periodic limb movement disorder)     3  Other insomnia     4  Hypersomnia     5  Parkinson's disease ([facility] )     6  Mild cognitive impairment with memory loss       PLAN:  1  I reviewed results of the Sleep studies with the patient and his son:  He has diagnostic study in 2017 demonstrated an AHI of 8 9 per hour  He had no stage REM oxygen saturation was 78%  There was severe periodic limb movements of sleep and moderate sleep fragmentation  2  I discussed treatment options with risks and benefits  3  He declined re-initiating CPAP stating it makes me scared    He understands risks of untreated obstructive sleep apnea and is willing to accept  I advised to avoid sleeping in the supine position and on precautions to improve his breathing perioperatively  4  I offered trial of Mirapex but he declined  He sees Neurology and plans to follow up with them with respect to his Parkinson's that likely underlies eyes his periodic limb movements of sleep and REM behavior disorder  5  Cognitive behavioral therapy was initiated, Sleep Hygiene and behavioral techniques to manage Insomnia were discussed  Specifically limiting time in bed to 7-1/2 hours or less, avoiding daytime naps and on relaxation techniques  6  They will call for follow-up if he decides to re-initiate positive airway pressure therapy  Thank you for allowing me to participate in the care of this patient      Sincerely,    Authenticated electronically by Heydi Richard MD on 71/93/02   Board Certified Specialist

## 2019-05-03 LAB
LEFT EYE DIABETIC RETINOPATHY: NORMAL
RIGHT EYE DIABETIC RETINOPATHY: NORMAL

## 2019-05-21 DIAGNOSIS — K21.9 CHRONIC GERD: ICD-10-CM

## 2019-05-21 RX ORDER — RANITIDINE 300 MG/1
TABLET ORAL
Refills: 4 | OUTPATIENT
Start: 2019-05-21

## 2019-05-25 DIAGNOSIS — K21.9 CHRONIC GERD: ICD-10-CM

## 2019-05-27 RX ORDER — RANITIDINE 300 MG/1
TABLET ORAL
Refills: 4 | OUTPATIENT
Start: 2019-05-27

## 2019-06-23 DIAGNOSIS — K21.9 CHRONIC GERD: ICD-10-CM

## 2019-06-24 RX ORDER — RANITIDINE 300 MG/1
TABLET ORAL
Refills: 4 | OUTPATIENT
Start: 2019-06-24

## 2019-06-24 NOTE — TELEPHONE ENCOUNTER
Please call the pharmacy and cancel automatic refills, he is no longer at this practice  Dr Sherita Neil refused this med twice, on 5/21 and 5/25, this is now the third refusal      Thank you!

## 2019-06-25 DIAGNOSIS — K21.9 CHRONIC GERD: ICD-10-CM

## 2019-06-25 RX ORDER — RANITIDINE 300 MG/1
TABLET ORAL
Refills: 4 | OUTPATIENT
Start: 2019-06-25

## 2019-09-09 DIAGNOSIS — F33.42 RECURRENT MAJOR DEPRESSIVE DISORDER, IN FULL REMISSION (HCC): ICD-10-CM

## 2019-09-09 DIAGNOSIS — R41.89 COGNITIVE CHANGES: ICD-10-CM

## 2019-09-09 NOTE — TELEPHONE ENCOUNTER
Refill Request for Celexa 20 mg 1 tab daily #90, and Donepezil 10 mg 1 tab at bedtime #85 [id number]

## 2019-10-30 ENCOUNTER — TELEPHONE (OUTPATIENT)
Dept: NEUROLOGY | Facility: CLINIC | Age: 74
End: 2019-10-30

## 2020-09-24 ENCOUNTER — TELEPHONE (OUTPATIENT)
Dept: FAMILY MEDICINE CLINIC | Facility: CLINIC | Age: 75
End: 2020-09-24

## 2020-09-24 NOTE — TELEPHONE ENCOUNTER
Sergio Padilla from [facility]  is requesting order for speech therapy  Patient is having difficulty swallowing liquids & solids & having cognitive issues       Fax

## 2020-10-09 ENCOUNTER — NURSING HOME VISIT (OUTPATIENT)
Dept: GERIATRICS | Facility: OTHER | Age: 75
End: 2020-10-09
Payer: MEDICARE

## 2020-10-09 DIAGNOSIS — F32.A DEPRESSION, UNSPECIFIED DEPRESSION TYPE: ICD-10-CM

## 2020-10-09 DIAGNOSIS — I10 ESSENTIAL HYPERTENSION: ICD-10-CM

## 2020-10-09 DIAGNOSIS — E11.9 TYPE 2 DIABETES MELLITUS WITHOUT COMPLICATION, WITHOUT LONG-TERM CURRENT USE OF INSULIN (HCC): ICD-10-CM

## 2020-10-09 DIAGNOSIS — F03.90 DEMENTIA WITHOUT BEHAVIORAL DISTURBANCE, UNSPECIFIED DEMENTIA TYPE (HCC): ICD-10-CM

## 2020-10-09 DIAGNOSIS — S22.42XD CLOSED FRACTURE OF MULTIPLE RIBS OF LEFT SIDE WITH ROUTINE HEALING: ICD-10-CM

## 2020-10-09 DIAGNOSIS — R13.10 DYSPHAGIA, UNSPECIFIED TYPE: ICD-10-CM

## 2020-10-09 DIAGNOSIS — R26.2 AMBULATORY DYSFUNCTION: Primary | ICD-10-CM

## 2020-10-09 DIAGNOSIS — I25.119 CORONARY ARTERY DISEASE INVOLVING NATIVE HEART WITH ANGINA PECTORIS, UNSPECIFIED VESSEL OR LESION TYPE (HCC): ICD-10-CM

## 2020-10-09 PROBLEM — S22.42XA CLOSED FRACTURE OF MULTIPLE RIBS OF LEFT SIDE: Status: ACTIVE | Noted: 2020-10-01

## 2020-10-09 PROBLEM — J98.11 ATELECTASIS: Status: ACTIVE | Noted: 2020-09-18

## 2020-10-09 PROBLEM — F41.1 GAD (GENERALIZED ANXIETY DISORDER): Status: ACTIVE | Noted: 2020-03-10

## 2020-10-09 PROBLEM — R26.9 GAIT DISTURBANCE: Status: ACTIVE | Noted: 2017-05-31

## 2020-10-09 PROBLEM — J45.909 REACTIVE AIRWAY DISEASE WITHOUT COMPLICATION: Status: ACTIVE | Noted: 2019-09-26

## 2020-10-09 PROBLEM — R41.82 ALTERED MENTAL STATE: Status: ACTIVE | Noted: 2020-06-13

## 2020-10-09 PROBLEM — I35.0 NONRHEUMATIC AORTIC VALVE STENOSIS: Status: ACTIVE | Noted: 2018-02-06

## 2020-10-09 PROBLEM — I77.819 AORTIC DILATATION (HCC): Status: ACTIVE | Noted: 2020-03-13

## 2020-10-09 PROBLEM — J18.9 PNEUMONIA: Status: ACTIVE | Noted: 2020-09-18

## 2020-10-09 PROBLEM — M79.10 MYALGIA: Status: ACTIVE | Noted: 2019-06-18

## 2020-10-09 PROBLEM — S22.39XA CLOSED RIB FRACTURE: Status: ACTIVE | Noted: 2020-10-01

## 2020-10-09 PROBLEM — R42 DIZZINESS: Status: ACTIVE | Noted: 2017-07-27

## 2020-10-09 PROBLEM — R42 LIGHTHEADEDNESS: Status: ACTIVE | Noted: 2018-09-12

## 2020-10-09 PROBLEM — M54.12 CERVICAL RADICULAR PAIN: Status: ACTIVE | Noted: 2017-08-03

## 2020-10-09 PROBLEM — H91.93 BILATERAL HEARING LOSS: Status: ACTIVE | Noted: 2017-07-13

## 2020-10-09 PROBLEM — J45.40 MODERATE PERSISTENT ASTHMA WITHOUT COMPLICATION: Status: ACTIVE | Noted: 2020-05-05

## 2020-10-09 PROBLEM — G25.0 ESSENTIAL TREMOR: Status: ACTIVE | Noted: 2017-03-20

## 2020-10-09 PROBLEM — R29.6 FREQUENT FALLS: Status: ACTIVE | Noted: 2020-08-08

## 2020-10-09 PROBLEM — G20 DEMENTIA DUE TO PARKINSON'S DISEASE WITH BEHAVIORAL DISTURBANCE (HCC): Status: ACTIVE | Noted: 2020-08-08

## 2020-10-09 PROBLEM — R26.89 FUNCTIONAL GAIT ABNORMALITY: Status: ACTIVE | Noted: 2020-09-18

## 2020-10-09 PROBLEM — G62.9 ACQUIRED POLYNEUROPATHY: Status: ACTIVE | Noted: 2017-05-31

## 2020-10-09 PROBLEM — F02.81 DEMENTIA DUE TO PARKINSON'S DISEASE WITH BEHAVIORAL DISTURBANCE (HCC): Status: ACTIVE | Noted: 2020-08-08

## 2020-10-09 PROCEDURE — 99306 1ST NF CARE HIGH MDM 50: CPT | Performed by: INTERNAL MEDICINE

## 2020-10-09 RX ORDER — TRAZODONE HYDROCHLORIDE 150 MG/1
150 TABLET ORAL
COMMUNITY

## 2020-10-09 RX ORDER — SENNA AND DOCUSATE SODIUM 50; 8.6 MG/1; MG/1
1 TABLET, FILM COATED ORAL 2 TIMES DAILY
COMMUNITY
Start: 2020-10-06 | End: 2021-10-06

## 2020-10-09 RX ORDER — QUETIAPINE FUMARATE 25 MG/1
1 TABLET, FILM COATED ORAL 2 TIMES DAILY
COMMUNITY

## 2020-10-09 RX ORDER — CHOLECALCIFEROL (VITAMIN D3) 125 MCG
5 CAPSULE ORAL
COMMUNITY
Start: 2020-07-17

## 2020-10-09 RX ORDER — FAMOTIDINE 20 MG/1
20 TABLET, FILM COATED ORAL 2 TIMES DAILY
COMMUNITY

## 2020-10-09 RX ORDER — LORAZEPAM 0.5 MG/1
0.5 TABLET ORAL EVERY 6 HOURS PRN
COMMUNITY
Start: 2020-10-06 | End: 2021-10-06

## 2020-10-09 RX ORDER — DOCUSATE SODIUM 100 MG/1
100 CAPSULE, LIQUID FILLED ORAL 2 TIMES DAILY
COMMUNITY

## 2020-10-09 RX ORDER — ACETAMINOPHEN 500 MG
500 TABLET ORAL 4 TIMES DAILY
COMMUNITY
Start: 2020-10-06 | End: 2020-10-16

## 2020-10-09 RX ORDER — ATORVASTATIN CALCIUM 40 MG/1
80 TABLET, FILM COATED ORAL EVERY EVENING
Qty: 90 TABLET | Refills: 3
Start: 2020-10-09

## 2020-10-12 ENCOUNTER — NURSING HOME VISIT (OUTPATIENT)
Dept: GERIATRICS | Facility: OTHER | Age: 75
End: 2020-10-12
Payer: MEDICARE

## 2020-10-12 DIAGNOSIS — R41.82 ALTERED MENTAL STATUS, UNSPECIFIED ALTERED MENTAL STATUS TYPE: Primary | ICD-10-CM

## 2020-10-12 PROCEDURE — 99310 SBSQ NF CARE HIGH MDM 45: CPT | Performed by: INTERNAL MEDICINE
